# Patient Record
Sex: MALE | Race: WHITE | Employment: FULL TIME | URBAN - METROPOLITAN AREA
[De-identification: names, ages, dates, MRNs, and addresses within clinical notes are randomized per-mention and may not be internally consistent; named-entity substitution may affect disease eponyms.]

---

## 2022-10-26 ENCOUNTER — HOSPITAL ENCOUNTER (OUTPATIENT)
Age: 52
Setting detail: OBSERVATION
Discharge: HOME | End: 2022-10-27
Attending: EMERGENCY MEDICINE | Admitting: SURGERY
Payer: MEDICAID

## 2022-10-26 ENCOUNTER — APPOINTMENT (OUTPATIENT)
Dept: GENERAL RADIOLOGY | Age: 52
End: 2022-10-26
Attending: EMERGENCY MEDICINE
Payer: MEDICAID

## 2022-10-26 ENCOUNTER — APPOINTMENT (OUTPATIENT)
Dept: GENERAL RADIOLOGY | Age: 52
End: 2022-10-26
Attending: SURGERY
Payer: MEDICAID

## 2022-10-26 ENCOUNTER — ANESTHESIA EVENT (OUTPATIENT)
Dept: SURGERY | Age: 52
End: 2022-10-26
Payer: MEDICAID

## 2022-10-26 ENCOUNTER — APPOINTMENT (OUTPATIENT)
Dept: CT IMAGING | Age: 52
End: 2022-10-26
Attending: EMERGENCY MEDICINE
Payer: MEDICAID

## 2022-10-26 ENCOUNTER — ANESTHESIA (OUTPATIENT)
Dept: SURGERY | Age: 52
End: 2022-10-26
Payer: MEDICAID

## 2022-10-26 DIAGNOSIS — E11.9 TYPE 2 DIABETES MELLITUS WITHOUT COMPLICATION, WITHOUT LONG-TERM CURRENT USE OF INSULIN (HCC): ICD-10-CM

## 2022-10-26 DIAGNOSIS — K81.9 CHOLECYSTITIS: Primary | ICD-10-CM

## 2022-10-26 PROBLEM — K81.0 ACUTE CHOLECYSTITIS: Status: ACTIVE | Noted: 2022-10-26

## 2022-10-26 LAB
ALBUMIN SERPL-MCNC: 3.9 G/DL (ref 3.5–5)
ALBUMIN/GLOB SERPL: 1.1 {RATIO} (ref 1.1–2.2)
ALP SERPL-CCNC: 99 U/L (ref 45–117)
ALT SERPL-CCNC: 45 U/L (ref 12–78)
ANION GAP SERPL CALC-SCNC: 9 MMOL/L (ref 5–15)
AST SERPL-CCNC: 21 U/L (ref 15–37)
ATRIAL RATE: 88 BPM
BASOPHILS # BLD: 0.1 K/UL (ref 0–0.1)
BASOPHILS NFR BLD: 0 % (ref 0–1)
BILIRUB SERPL-MCNC: 1.8 MG/DL (ref 0.2–1)
BNP SERPL-MCNC: 113 PG/ML
BUN SERPL-MCNC: 12 MG/DL (ref 6–20)
BUN/CREAT SERPL: 15 (ref 12–20)
CALCIUM SERPL-MCNC: 10.1 MG/DL (ref 8.5–10.1)
CALCULATED P AXIS, ECG09: 70 DEGREES
CALCULATED R AXIS, ECG10: 102 DEGREES
CALCULATED T AXIS, ECG11: 10 DEGREES
CHLORIDE SERPL-SCNC: 98 MMOL/L (ref 97–108)
CO2 SERPL-SCNC: 25 MMOL/L (ref 21–32)
CREAT SERPL-MCNC: 0.82 MG/DL (ref 0.7–1.3)
DIAGNOSIS, 93000: NORMAL
DIFFERENTIAL METHOD BLD: ABNORMAL
EOSINOPHIL # BLD: 0.1 K/UL (ref 0–0.4)
EOSINOPHIL NFR BLD: 1 % (ref 0–7)
ERYTHROCYTE [DISTWIDTH] IN BLOOD BY AUTOMATED COUNT: 11.9 % (ref 11.5–14.5)
GLOBULIN SER CALC-MCNC: 3.4 G/DL (ref 2–4)
GLUCOSE BLD STRIP.AUTO-MCNC: 277 MG/DL (ref 65–117)
GLUCOSE BLD STRIP.AUTO-MCNC: 298 MG/DL (ref 65–117)
GLUCOSE BLD STRIP.AUTO-MCNC: 323 MG/DL (ref 65–117)
GLUCOSE SERPL-MCNC: 291 MG/DL (ref 65–100)
HCT VFR BLD AUTO: 49.6 % (ref 36.6–50.3)
HGB BLD-MCNC: 18.5 G/DL (ref 12.1–17)
IMM GRANULOCYTES # BLD AUTO: 0 K/UL (ref 0–0.04)
IMM GRANULOCYTES NFR BLD AUTO: 0 % (ref 0–0.5)
LIPASE SERPL-CCNC: 53 U/L (ref 73–393)
LYMPHOCYTES # BLD: 1.7 K/UL (ref 0.8–3.5)
LYMPHOCYTES NFR BLD: 15 % (ref 12–49)
MAGNESIUM SERPL-MCNC: 1.8 MG/DL (ref 1.6–2.4)
MCH RBC QN AUTO: 31.5 PG (ref 26–34)
MCHC RBC AUTO-ENTMCNC: 37.3 G/DL (ref 30–36.5)
MCV RBC AUTO: 84.4 FL (ref 80–99)
MONOCYTES # BLD: 0.6 K/UL (ref 0–1)
MONOCYTES NFR BLD: 6 % (ref 5–13)
NEUTS SEG # BLD: 8.8 K/UL (ref 1.8–8)
NEUTS SEG NFR BLD: 78 % (ref 32–75)
NRBC # BLD: 0 K/UL (ref 0–0.01)
NRBC BLD-RTO: 0 PER 100 WBC
P-R INTERVAL, ECG05: 166 MS
PLATELET # BLD AUTO: 127 K/UL (ref 150–400)
PMV BLD AUTO: 10.1 FL (ref 8.9–12.9)
POTASSIUM SERPL-SCNC: 4 MMOL/L (ref 3.5–5.1)
PROT SERPL-MCNC: 7.3 G/DL (ref 6.4–8.2)
Q-T INTERVAL, ECG07: 380 MS
QRS DURATION, ECG06: 88 MS
QTC CALCULATION (BEZET), ECG08: 459 MS
RBC # BLD AUTO: 5.88 M/UL (ref 4.1–5.7)
SERVICE CMNT-IMP: ABNORMAL
SODIUM SERPL-SCNC: 132 MMOL/L (ref 136–145)
TROPONIN-HIGH SENSITIVITY: 7 NG/L (ref 0–76)
TROPONIN-HIGH SENSITIVITY: 7 NG/L (ref 0–76)
VENTRICULAR RATE, ECG03: 88 BPM
WBC # BLD AUTO: 11.3 K/UL (ref 4.1–11.1)

## 2022-10-26 PROCEDURE — 99285 EMERGENCY DEPT VISIT HI MDM: CPT

## 2022-10-26 PROCEDURE — 77030002895 HC DEV VASC CLOSR COVD -B: Performed by: SURGERY

## 2022-10-26 PROCEDURE — 84484 ASSAY OF TROPONIN QUANT: CPT

## 2022-10-26 PROCEDURE — 85025 COMPLETE CBC W/AUTO DIFF WBC: CPT

## 2022-10-26 PROCEDURE — 74011250636 HC RX REV CODE- 250/636: Performed by: SURGERY

## 2022-10-26 PROCEDURE — 77030009851 HC PCH RTVR ENDOSC AMR -B: Performed by: SURGERY

## 2022-10-26 PROCEDURE — 77030002933 HC SUT MCRYL J&J -A: Performed by: SURGERY

## 2022-10-26 PROCEDURE — 76210000017 HC OR PH I REC 1.5 TO 2 HR: Performed by: SURGERY

## 2022-10-26 PROCEDURE — 77030020829: Performed by: SURGERY

## 2022-10-26 PROCEDURE — 77030012770 HC TRCR OPT FX AMR -B: Performed by: SURGERY

## 2022-10-26 PROCEDURE — 74011000250 HC RX REV CODE- 250: Performed by: NURSE ANESTHETIST, CERTIFIED REGISTERED

## 2022-10-26 PROCEDURE — 74011250636 HC RX REV CODE- 250/636: Performed by: NURSE ANESTHETIST, CERTIFIED REGISTERED

## 2022-10-26 PROCEDURE — 74177 CT ABD & PELVIS W/CONTRAST: CPT

## 2022-10-26 PROCEDURE — 96365 THER/PROPH/DIAG IV INF INIT: CPT

## 2022-10-26 PROCEDURE — 74300 X-RAY BILE DUCTS/PANCREAS: CPT

## 2022-10-26 PROCEDURE — 77030010513 HC APPL CLP LIG J&J -C: Performed by: SURGERY

## 2022-10-26 PROCEDURE — 71045 X-RAY EXAM CHEST 1 VIEW: CPT

## 2022-10-26 PROCEDURE — 74011250636 HC RX REV CODE- 250/636: Performed by: EMERGENCY MEDICINE

## 2022-10-26 PROCEDURE — 82962 GLUCOSE BLOOD TEST: CPT

## 2022-10-26 PROCEDURE — 74011000636 HC RX REV CODE- 636: Performed by: EMERGENCY MEDICINE

## 2022-10-26 PROCEDURE — 76060000033 HC ANESTHESIA 1 TO 1.5 HR: Performed by: SURGERY

## 2022-10-26 PROCEDURE — 83690 ASSAY OF LIPASE: CPT

## 2022-10-26 PROCEDURE — 74011000258 HC RX REV CODE- 258: Performed by: EMERGENCY MEDICINE

## 2022-10-26 PROCEDURE — 76010000149 HC OR TIME 1 TO 1.5 HR: Performed by: SURGERY

## 2022-10-26 PROCEDURE — 93005 ELECTROCARDIOGRAM TRACING: CPT

## 2022-10-26 PROCEDURE — 74011636637 HC RX REV CODE- 636/637: Performed by: ANESTHESIOLOGY

## 2022-10-26 PROCEDURE — 74011250636 HC RX REV CODE- 250/636: Performed by: ANESTHESIOLOGY

## 2022-10-26 PROCEDURE — 36415 COLL VENOUS BLD VENIPUNCTURE: CPT

## 2022-10-26 PROCEDURE — 2709999900 HC NON-CHARGEABLE SUPPLY: Performed by: SURGERY

## 2022-10-26 PROCEDURE — 74011250637 HC RX REV CODE- 250/637: Performed by: EMERGENCY MEDICINE

## 2022-10-26 PROCEDURE — 74011000250 HC RX REV CODE- 250: Performed by: SURGERY

## 2022-10-26 PROCEDURE — 77030013079 HC BLNKT BAIR HGGR 3M -A: Performed by: ANESTHESIOLOGY

## 2022-10-26 PROCEDURE — 99218 PR INITIAL OBSERVATION CARE/DAY 30 MINUTES: CPT | Performed by: SURGERY

## 2022-10-26 PROCEDURE — 80053 COMPREHEN METABOLIC PANEL: CPT

## 2022-10-26 PROCEDURE — 74011000250 HC RX REV CODE- 250: Performed by: EMERGENCY MEDICINE

## 2022-10-26 PROCEDURE — 83880 ASSAY OF NATRIURETIC PEPTIDE: CPT

## 2022-10-26 PROCEDURE — 77030008684 HC TU ET CUF COVD -B: Performed by: ANESTHESIOLOGY

## 2022-10-26 PROCEDURE — 74011000636 HC RX REV CODE- 636: Performed by: SURGERY

## 2022-10-26 PROCEDURE — G0378 HOSPITAL OBSERVATION PER HR: HCPCS

## 2022-10-26 PROCEDURE — 77030004813 HC CATH CHOLGM TELE -B: Performed by: SURGERY

## 2022-10-26 PROCEDURE — 83735 ASSAY OF MAGNESIUM: CPT

## 2022-10-26 PROCEDURE — 88304 TISSUE EXAM BY PATHOLOGIST: CPT

## 2022-10-26 PROCEDURE — 77030010507 HC ADH SKN DERMBND J&J -B: Performed by: SURGERY

## 2022-10-26 PROCEDURE — 77030016151 HC PROTCTR LNS DFOG COVD -B: Performed by: SURGERY

## 2022-10-26 PROCEDURE — 77030009403 HC ELECTRD ENDO MEGA -B: Performed by: SURGERY

## 2022-10-26 PROCEDURE — 88313 SPECIAL STAINS GROUP 2: CPT

## 2022-10-26 PROCEDURE — 77030008606 HC TRCR ENDOSC KII AMR -B: Performed by: SURGERY

## 2022-10-26 PROCEDURE — 74011250637 HC RX REV CODE- 250/637: Performed by: SURGERY

## 2022-10-26 PROCEDURE — 77030031139 HC SUT VCRL2 J&J -A: Performed by: SURGERY

## 2022-10-26 PROCEDURE — 88307 TISSUE EXAM BY PATHOLOGIST: CPT

## 2022-10-26 PROCEDURE — 47563 LAPARO CHOLECYSTECTOMY/GRAPH: CPT | Performed by: SURGERY

## 2022-10-26 PROCEDURE — 77030008756 HC TU IRR SUC STRY -B: Performed by: SURGERY

## 2022-10-26 RX ORDER — BUPIVACAINE HYDROCHLORIDE AND EPINEPHRINE 5; 5 MG/ML; UG/ML
INJECTION, SOLUTION PERINEURAL AS NEEDED
Status: DISCONTINUED | OUTPATIENT
Start: 2022-10-26 | End: 2022-10-26 | Stop reason: HOSPADM

## 2022-10-26 RX ORDER — ONDANSETRON 2 MG/ML
4 INJECTION INTRAMUSCULAR; INTRAVENOUS AS NEEDED
Status: DISCONTINUED | OUTPATIENT
Start: 2022-10-26 | End: 2022-10-26 | Stop reason: HOSPADM

## 2022-10-26 RX ORDER — HYDROMORPHONE HYDROCHLORIDE 1 MG/ML
.2-.5 INJECTION, SOLUTION INTRAMUSCULAR; INTRAVENOUS; SUBCUTANEOUS
Status: DISCONTINUED | OUTPATIENT
Start: 2022-10-26 | End: 2022-10-26 | Stop reason: HOSPADM

## 2022-10-26 RX ORDER — FENTANYL CITRATE 50 UG/ML
50 INJECTION, SOLUTION INTRAMUSCULAR; INTRAVENOUS AS NEEDED
Status: DISCONTINUED | OUTPATIENT
Start: 2022-10-26 | End: 2022-10-26

## 2022-10-26 RX ORDER — FENTANYL CITRATE 50 UG/ML
25 INJECTION, SOLUTION INTRAMUSCULAR; INTRAVENOUS
Status: DISCONTINUED | OUTPATIENT
Start: 2022-10-26 | End: 2022-10-26 | Stop reason: HOSPADM

## 2022-10-26 RX ORDER — LIDOCAINE HYDROCHLORIDE 10 MG/ML
0.1 INJECTION, SOLUTION EPIDURAL; INFILTRATION; INTRACAUDAL; PERINEURAL AS NEEDED
Status: DISCONTINUED | OUTPATIENT
Start: 2022-10-26 | End: 2022-10-26

## 2022-10-26 RX ORDER — GLYCOPYRROLATE 0.2 MG/ML
INJECTION INTRAMUSCULAR; INTRAVENOUS AS NEEDED
Status: DISCONTINUED | OUTPATIENT
Start: 2022-10-26 | End: 2022-10-26 | Stop reason: HOSPADM

## 2022-10-26 RX ORDER — METOPROLOL TARTRATE 25 MG/1
25 TABLET, FILM COATED ORAL 2 TIMES DAILY
Status: DISCONTINUED | OUTPATIENT
Start: 2022-10-26 | End: 2022-10-27 | Stop reason: HOSPADM

## 2022-10-26 RX ORDER — NALOXONE HYDROCHLORIDE 0.4 MG/ML
0.4 INJECTION, SOLUTION INTRAMUSCULAR; INTRAVENOUS; SUBCUTANEOUS AS NEEDED
Status: DISCONTINUED | OUTPATIENT
Start: 2022-10-26 | End: 2022-10-27 | Stop reason: HOSPADM

## 2022-10-26 RX ORDER — SODIUM CHLORIDE 0.9 % (FLUSH) 0.9 %
5-40 SYRINGE (ML) INJECTION EVERY 8 HOURS
Status: DISCONTINUED | OUTPATIENT
Start: 2022-10-26 | End: 2022-10-27 | Stop reason: HOSPADM

## 2022-10-26 RX ORDER — AMLODIPINE BESYLATE 5 MG/1
5 TABLET ORAL DAILY
Status: DISCONTINUED | OUTPATIENT
Start: 2022-10-27 | End: 2022-10-27 | Stop reason: HOSPADM

## 2022-10-26 RX ORDER — METOPROLOL TARTRATE 5 MG/5ML
5 INJECTION INTRAVENOUS
Status: DISCONTINUED | OUTPATIENT
Start: 2022-10-26 | End: 2022-10-26

## 2022-10-26 RX ORDER — ONDANSETRON 2 MG/ML
INJECTION INTRAMUSCULAR; INTRAVENOUS AS NEEDED
Status: DISCONTINUED | OUTPATIENT
Start: 2022-10-26 | End: 2022-10-26 | Stop reason: HOSPADM

## 2022-10-26 RX ORDER — SODIUM CHLORIDE, SODIUM LACTATE, POTASSIUM CHLORIDE, CALCIUM CHLORIDE 600; 310; 30; 20 MG/100ML; MG/100ML; MG/100ML; MG/100ML
25 INJECTION, SOLUTION INTRAVENOUS CONTINUOUS
Status: DISCONTINUED | OUTPATIENT
Start: 2022-10-26 | End: 2022-10-26 | Stop reason: HOSPADM

## 2022-10-26 RX ORDER — SODIUM CHLORIDE 0.9 % (FLUSH) 0.9 %
5-40 SYRINGE (ML) INJECTION AS NEEDED
Status: DISCONTINUED | OUTPATIENT
Start: 2022-10-26 | End: 2022-10-27 | Stop reason: HOSPADM

## 2022-10-26 RX ORDER — NEOSTIGMINE METHYLSULFATE 1 MG/ML
INJECTION, SOLUTION INTRAVENOUS AS NEEDED
Status: DISCONTINUED | OUTPATIENT
Start: 2022-10-26 | End: 2022-10-26 | Stop reason: HOSPADM

## 2022-10-26 RX ORDER — SODIUM CHLORIDE, SODIUM LACTATE, POTASSIUM CHLORIDE, CALCIUM CHLORIDE 600; 310; 30; 20 MG/100ML; MG/100ML; MG/100ML; MG/100ML
INJECTION, SOLUTION INTRAVENOUS
Status: DISCONTINUED | OUTPATIENT
Start: 2022-10-26 | End: 2022-10-26 | Stop reason: HOSPADM

## 2022-10-26 RX ORDER — MORPHINE SULFATE 2 MG/ML
4 INJECTION, SOLUTION INTRAMUSCULAR; INTRAVENOUS
Status: DISPENSED | OUTPATIENT
Start: 2022-10-26 | End: 2022-10-26

## 2022-10-26 RX ORDER — PROPOFOL 10 MG/ML
INJECTION, EMULSION INTRAVENOUS AS NEEDED
Status: DISCONTINUED | OUTPATIENT
Start: 2022-10-26 | End: 2022-10-26 | Stop reason: HOSPADM

## 2022-10-26 RX ORDER — HYDROMORPHONE HYDROCHLORIDE 1 MG/ML
0.5 INJECTION, SOLUTION INTRAMUSCULAR; INTRAVENOUS; SUBCUTANEOUS
Status: DISCONTINUED | OUTPATIENT
Start: 2022-10-26 | End: 2022-10-27 | Stop reason: HOSPADM

## 2022-10-26 RX ORDER — EPHEDRINE SULFATE/0.9% NACL/PF 50 MG/5 ML
SYRINGE (ML) INTRAVENOUS AS NEEDED
Status: DISCONTINUED | OUTPATIENT
Start: 2022-10-26 | End: 2022-10-26 | Stop reason: HOSPADM

## 2022-10-26 RX ORDER — ROCURONIUM BROMIDE 10 MG/ML
INJECTION, SOLUTION INTRAVENOUS AS NEEDED
Status: DISCONTINUED | OUTPATIENT
Start: 2022-10-26 | End: 2022-10-26 | Stop reason: HOSPADM

## 2022-10-26 RX ORDER — DEXTROSE, SODIUM CHLORIDE, AND POTASSIUM CHLORIDE 5; .45; .15 G/100ML; G/100ML; G/100ML
125 INJECTION INTRAVENOUS CONTINUOUS
Status: DISCONTINUED | OUTPATIENT
Start: 2022-10-26 | End: 2022-10-27

## 2022-10-26 RX ORDER — TRAZODONE HYDROCHLORIDE 100 MG/1
100 TABLET ORAL
Status: DISCONTINUED | OUTPATIENT
Start: 2022-10-26 | End: 2022-10-27 | Stop reason: HOSPADM

## 2022-10-26 RX ORDER — MIDAZOLAM HYDROCHLORIDE 1 MG/ML
1 INJECTION, SOLUTION INTRAMUSCULAR; INTRAVENOUS AS NEEDED
Status: DISCONTINUED | OUTPATIENT
Start: 2022-10-26 | End: 2022-10-26

## 2022-10-26 RX ORDER — OXYCODONE HYDROCHLORIDE 5 MG/1
5 TABLET ORAL
Status: DISCONTINUED | OUTPATIENT
Start: 2022-10-26 | End: 2022-10-27 | Stop reason: HOSPADM

## 2022-10-26 RX ORDER — FENTANYL CITRATE 50 UG/ML
INJECTION, SOLUTION INTRAMUSCULAR; INTRAVENOUS AS NEEDED
Status: DISCONTINUED | OUTPATIENT
Start: 2022-10-26 | End: 2022-10-26 | Stop reason: HOSPADM

## 2022-10-26 RX ORDER — AMOXICILLIN 250 MG
1 CAPSULE ORAL DAILY
Status: DISCONTINUED | OUTPATIENT
Start: 2022-10-27 | End: 2022-10-27 | Stop reason: HOSPADM

## 2022-10-26 RX ORDER — CEFAZOLIN SODIUM 1 G/3ML
INJECTION, POWDER, FOR SOLUTION INTRAMUSCULAR; INTRAVENOUS AS NEEDED
Status: DISCONTINUED | OUTPATIENT
Start: 2022-10-26 | End: 2022-10-26 | Stop reason: HOSPADM

## 2022-10-26 RX ORDER — ONDANSETRON 2 MG/ML
4 INJECTION INTRAMUSCULAR; INTRAVENOUS
Status: DISCONTINUED | OUTPATIENT
Start: 2022-10-26 | End: 2022-10-27 | Stop reason: HOSPADM

## 2022-10-26 RX ORDER — SODIUM CHLORIDE, SODIUM LACTATE, POTASSIUM CHLORIDE, CALCIUM CHLORIDE 600; 310; 30; 20 MG/100ML; MG/100ML; MG/100ML; MG/100ML
25 INJECTION, SOLUTION INTRAVENOUS CONTINUOUS
Status: DISCONTINUED | OUTPATIENT
Start: 2022-10-26 | End: 2022-10-26

## 2022-10-26 RX ORDER — PHENYLEPHRINE HCL IN 0.9% NACL 0.4MG/10ML
SYRINGE (ML) INTRAVENOUS AS NEEDED
Status: DISCONTINUED | OUTPATIENT
Start: 2022-10-26 | End: 2022-10-26 | Stop reason: HOSPADM

## 2022-10-26 RX ORDER — METFORMIN HYDROCHLORIDE 500 MG/1
1000 TABLET ORAL 2 TIMES DAILY WITH MEALS
Status: DISCONTINUED | OUTPATIENT
Start: 2022-10-27 | End: 2022-10-27 | Stop reason: HOSPADM

## 2022-10-26 RX ORDER — ACETAMINOPHEN 325 MG/1
650 TABLET ORAL
Status: DISCONTINUED | OUTPATIENT
Start: 2022-10-26 | End: 2022-10-27 | Stop reason: HOSPADM

## 2022-10-26 RX ORDER — DIPHENHYDRAMINE HYDROCHLORIDE 50 MG/ML
25 INJECTION, SOLUTION INTRAMUSCULAR; INTRAVENOUS
Status: DISCONTINUED | OUTPATIENT
Start: 2022-10-26 | End: 2022-10-27 | Stop reason: HOSPADM

## 2022-10-26 RX ORDER — MIDAZOLAM HYDROCHLORIDE 1 MG/ML
INJECTION, SOLUTION INTRAMUSCULAR; INTRAVENOUS AS NEEDED
Status: DISCONTINUED | OUTPATIENT
Start: 2022-10-26 | End: 2022-10-26 | Stop reason: HOSPADM

## 2022-10-26 RX ORDER — DEXAMETHASONE SODIUM PHOSPHATE 4 MG/ML
INJECTION, SOLUTION INTRA-ARTICULAR; INTRALESIONAL; INTRAMUSCULAR; INTRAVENOUS; SOFT TISSUE AS NEEDED
Status: DISCONTINUED | OUTPATIENT
Start: 2022-10-26 | End: 2022-10-26 | Stop reason: HOSPADM

## 2022-10-26 RX ADMIN — CEFAZOLIN 2 G: 1 INJECTION, POWDER, FOR SOLUTION INTRAMUSCULAR; INTRAVENOUS; PARENTERAL at 10:53

## 2022-10-26 RX ADMIN — MIDAZOLAM HYDROCHLORIDE 2 MG: 1 INJECTION, SOLUTION INTRAMUSCULAR; INTRAVENOUS at 10:40

## 2022-10-26 RX ADMIN — HYDROMORPHONE HYDROCHLORIDE 0.5 MG: 1 INJECTION, SOLUTION INTRAMUSCULAR; INTRAVENOUS; SUBCUTANEOUS at 13:04

## 2022-10-26 RX ADMIN — LIDOCAINE HYDROCHLORIDE 40 ML: 20 SOLUTION TOPICAL at 07:23

## 2022-10-26 RX ADMIN — OXYCODONE 5 MG: 5 TABLET ORAL at 21:04

## 2022-10-26 RX ADMIN — DEXTROSE MONOHYDRATE, SODIUM CHLORIDE, AND POTASSIUM CHLORIDE 125 ML/HR: 50; 4.5; 1.49 INJECTION, SOLUTION INTRAVENOUS at 21:06

## 2022-10-26 RX ADMIN — Medication 200 MCG: at 11:06

## 2022-10-26 RX ADMIN — METOPROLOL TARTRATE 25 MG: 25 TABLET ORAL at 18:00

## 2022-10-26 RX ADMIN — DEXAMETHASONE SODIUM PHOSPHATE 8 MG: 4 INJECTION, SOLUTION INTRAMUSCULAR; INTRAVENOUS at 11:19

## 2022-10-26 RX ADMIN — PIPERACILLIN AND TAZOBACTAM 4.5 G: 4; .5 INJECTION, POWDER, FOR SOLUTION INTRAVENOUS at 09:19

## 2022-10-26 RX ADMIN — OXYCODONE 5 MG: 5 TABLET ORAL at 14:22

## 2022-10-26 RX ADMIN — SODIUM CHLORIDE, PRESERVATIVE FREE 10 ML: 5 INJECTION INTRAVENOUS at 14:24

## 2022-10-26 RX ADMIN — Medication 3 MG: at 11:37

## 2022-10-26 RX ADMIN — SODIUM CHLORIDE, POTASSIUM CHLORIDE, SODIUM LACTATE AND CALCIUM CHLORIDE: 600; 310; 30; 20 INJECTION, SOLUTION INTRAVENOUS at 10:40

## 2022-10-26 RX ADMIN — Medication 1 AMPULE: at 14:00

## 2022-10-26 RX ADMIN — SODIUM CHLORIDE, PRESERVATIVE FREE 10 ML: 5 INJECTION INTRAVENOUS at 21:05

## 2022-10-26 RX ADMIN — Medication 10 UNITS: at 12:20

## 2022-10-26 RX ADMIN — IOPAMIDOL 100 ML: 755 INJECTION, SOLUTION INTRAVENOUS at 08:19

## 2022-10-26 RX ADMIN — FENTANYL CITRATE 100 MCG: 50 INJECTION INTRAMUSCULAR; INTRAVENOUS at 10:42

## 2022-10-26 RX ADMIN — FENTANYL CITRATE 50 MCG: 50 INJECTION INTRAMUSCULAR; INTRAVENOUS at 11:12

## 2022-10-26 RX ADMIN — FENTANYL CITRATE 100 MCG: 50 INJECTION INTRAMUSCULAR; INTRAVENOUS at 11:10

## 2022-10-26 RX ADMIN — PROPOFOL 200 MG: 10 INJECTION, EMULSION INTRAVENOUS at 10:42

## 2022-10-26 RX ADMIN — ROCURONIUM BROMIDE 20 MG: 10 INJECTION INTRAVENOUS at 11:10

## 2022-10-26 RX ADMIN — HYDROMORPHONE HYDROCHLORIDE 0.5 MG: 1 INJECTION, SOLUTION INTRAMUSCULAR; INTRAVENOUS; SUBCUTANEOUS at 12:47

## 2022-10-26 RX ADMIN — Medication 100 MCG: at 11:21

## 2022-10-26 RX ADMIN — Medication 20 UNITS: at 13:02

## 2022-10-26 RX ADMIN — Medication 5 MG: at 11:06

## 2022-10-26 RX ADMIN — GLYCOPYRROLATE 0.4 MG: 0.2 INJECTION, SOLUTION INTRAMUSCULAR; INTRAVENOUS at 11:37

## 2022-10-26 RX ADMIN — HYDROMORPHONE HYDROCHLORIDE 0.5 MG: 1 INJECTION, SOLUTION INTRAMUSCULAR; INTRAVENOUS; SUBCUTANEOUS at 12:31

## 2022-10-26 RX ADMIN — HYDROMORPHONE HYDROCHLORIDE 0.5 MG: 1 INJECTION, SOLUTION INTRAMUSCULAR; INTRAVENOUS; SUBCUTANEOUS at 16:34

## 2022-10-26 RX ADMIN — DEXTROSE MONOHYDRATE, SODIUM CHLORIDE, AND POTASSIUM CHLORIDE 125 ML/HR: 50; 4.5; 1.49 INJECTION, SOLUTION INTRAVENOUS at 12:29

## 2022-10-26 RX ADMIN — ONDANSETRON HYDROCHLORIDE 4 MG: 2 INJECTION, SOLUTION INTRAMUSCULAR; INTRAVENOUS at 11:19

## 2022-10-26 RX ADMIN — Medication 80 MCG: at 10:59

## 2022-10-26 RX ADMIN — ROCURONIUM BROMIDE 30 MG: 10 INJECTION INTRAVENOUS at 10:42

## 2022-10-26 NOTE — ED PROVIDER NOTES
EMERGENCY DEPARTMENT HISTORY AND PHYSICAL EXAM      Date: 10/26/2022  Patient Name: Renee Roger    History of Presenting Illness     Chief Complaint   Patient presents with    Chest Pain     Patient into triage for chest pain x1day. States it is constant medial chest pain, states some shortness of breath. Denies cardiac Hx       History Provided By: Patient    HPI: Renee Roger, 47 y.o. male with PMHx as noted below presents the emergency department chief complaint epigastric pain. Patient had onset of pain yesterday. He describes it as a dull, aching pain in his epigastrium that radiates up to his chest.  Pain seems to be constant and moderate in intensity. Pt denies any other alleviating or exacerbating factors. Additionally, pt specifically denies any recent fever, chills, headache, nausea, vomiting, SOB, lightheadedness, dizziness, numbness, weakness, BLE swelling, heart palpitations, urinary sxs, diarrhea, constipation, melena, hematochezia, cough, or congestion. PCP: Kendall Salvador MD    Current Facility-Administered Medications   Medication Dose Route Frequency Provider Last Rate Last Admin    morphine injection 4 mg  4 mg IntraVENous NOW Uche Self MD        piperacillin-tazobactam (ZOSYN) 4.5 g in 0.9% sodium chloride (MBP/ADV) 100 mL MBP  4.5 g IntraVENous NOW Uche Self MD         Current Outpatient Medications   Medication Sig Dispense Refill    amLODIPine (NORVASC) 5 mg tablet Take 1 Tab by mouth daily. 90 Tab 1    glimepiride (AMARYL) 2 mg tablet 1 Tab by Per NG tube route Daily (before breakfast). 90 Tab 1    polyethylene glycol (MIRALAX) 17 gram packet Take 1 Packet by mouth two (2) times a day. 30 Packet 2    senna-docusate (PERICOLACE) 8.6-50 mg per tablet Take 1 Tab by mouth daily. 15 Tab 0    metoprolol (LOPRESSOR) 25 mg tablet TAKE ONE TABLET BY MOUTH EVERY 12 HOURS 30 Tab 0    metFORMIN (GLUCOPHAGE) 1,000 mg tablet Take 1 Tab by mouth two (2) times daily (with meals). 60 Tab 2    traZODone (DESYREL) 100 mg tablet Take 1 Tab by mouth nightly. 30 Tab 2    ondansetron (ZOFRAN ODT) 4 mg disintegrating tablet Take 1 Tab by mouth every eight (8) hours as needed for Nausea. 15 Tab 1       Past History     Past Medical History:  Past Medical History:   Diagnosis Date    Asthma     DM (diabetes mellitus) ([facility] [address]     Encounter for long-term (current) use of medications 2016    Other ill-defined conditions(799.89) neck injury    Secondary DM without complications, uncontrolled 2016       Past Surgical History:  Past Surgical History:   Procedure Laterality Date    [place] CHEST SURGERY PROCEDURE UNLISTED         Family History:  Family History   Problem Relation Age of Onset    Cancer Mother     Asthma Mother     Diabetes Father        Social History:  Social History     Tobacco Use    Smoking status: Former     Packs/day: 1.00     Types: Cigarettes     Quit date: 2015     Years since quittin.7    Smokeless tobacco: Never   Substance Use Topics    Alcohol use: No    Drug use: No       Allergies:  No Known Allergies      Review of Systems   Review of Systems  Constitutional: Negative for fever, chills, and fatigue. HENT: Negative for congestion, sore throat, rhinorrhea, sneezing and neck stiffness   Eyes: Negative for discharge and redness. Respiratory: Negative for  shortness of breath, wheezing   Cardiovascular: Negative for chest pain, palpitations   Gastrointestinal: Positive abdominal pain. Negative for nausea, vomiting, abdominal pain, constipation, diarrhea and blood in stool. Genitourinary: Negative for dysuria, hematuria, flank pain, decreased urine volume, discharge,   Musculoskeletal: Negative for myalgias or joint pain . Skin: Negative for rash or lesions . Neurological: Negative weakness, light-headedness, numbness and headaches.        Physical Exam   Physical Exam    GENERAL: alert and oriented, no acute distress  EYES: PEERL, No injection, discharge or icterus. ENT: Mucous membranes pink and moist.  NECK: Supple  LUNGS: Airway patent. Non-labored respirations. Breath sounds clear with good air entry bilaterally. HEART: Regular rate and rhythm. No peripheral edema  ABDOMEN: Non-distended, epigastric and RUQ tenderness, without guarding or rebound. SKIN:  warm, dry  MSK/EXTREMITIES: Without swelling, tenderness or deformity, symmetric with normal ROM  NEUROLOGICAL: Alert, oriented      Diagnostic Study Results     Labs -     Recent Results (from the past 12 hour(s))   CBC WITH AUTOMATED DIFF    Collection Time: 10/26/22  6:14 AM   Result Value Ref Range    WBC 11.3 (H) 4.1 - 11.1 K/uL    RBC 5.88 (H) 4.10 - 5.70 M/uL    HGB 18.5 (H) 12.1 - 17.0 g/dL    HCT 49.6 36.6 - 50.3 %    MCV 84.4 80.0 - 99.0 FL    MCH 31.5 26.0 - 34.0 PG    MCHC 37.3 (H) 30.0 - 36.5 g/dL    RDW 11.9 11.5 - 14.5 %    PLATELET 801 (L) 642 - 400 K/uL    MPV 10.1 8.9 - 12.9 FL    NRBC 0.0 0  WBC    ABSOLUTE NRBC 0.00 0.00 - 0.01 K/uL    NEUTROPHILS 78 (H) 32 - 75 %    LYMPHOCYTES 15 12 - 49 %    MONOCYTES 6 5 - 13 %    EOSINOPHILS 1 0 - 7 %    BASOPHILS 0 0 - 1 %    IMMATURE GRANULOCYTES 0 0.0 - 0.5 %    ABS. NEUTROPHILS 8.8 (H) 1.8 - 8.0 K/UL    ABS. LYMPHOCYTES 1.7 0.8 - 3.5 K/UL    ABS. MONOCYTES 0.6 0.0 - 1.0 K/UL    ABS. EOSINOPHILS 0.1 0.0 - 0.4 K/UL    ABS. BASOPHILS 0.1 0.0 - 0.1 K/UL    ABS. IMM.  GRANS. 0.0 0.00 - 0.04 K/UL    DF AUTOMATED     METABOLIC PANEL, COMPREHENSIVE    Collection Time: 10/26/22  6:14 AM   Result Value Ref Range    Sodium 132 (L) 136 - 145 mmol/L    Potassium 4.0 3.5 - 5.1 mmol/L    Chloride 98 97 - 108 mmol/L    CO2 25 21 - 32 mmol/L    Anion gap 9 5 - 15 mmol/L    Glucose 291 (H) 65 - 100 mg/dL    BUN 12 6 - 20 MG/DL    Creatinine 0.82 0.70 - 1.30 MG/DL    BUN/Creatinine ratio 15 12 - 20      eGFR >60 >60 ml/min/1.73m2    Calcium 10.1 8.5 - 10.1 MG/DL    Bilirubin, total 1.8 (H) 0.2 - 1.0 MG/DL    ALT (SGPT) 45 12 - 78 U/L    AST (SGOT) 21 15 - 37 U/L Alk. phosphatase 99 45 - 117 U/L    Protein, total 7.3 6.4 - 8.2 g/dL    Albumin 3.9 3.5 - 5.0 g/dL    Globulin 3.4 2.0 - 4.0 g/dL    A-G Ratio 1.1 1.1 - 2.2     NT-PRO BNP    Collection Time: 10/26/22  6:14 AM   Result Value Ref Range    NT pro- <125 PG/ML   TROPONIN-HIGH SENSITIVITY    Collection Time: 10/26/22  6:14 AM   Result Value Ref Range    Troponin-High Sensitivity 7 0 - 76 ng/L   LIPASE    Collection Time: 10/26/22  6:14 AM   Result Value Ref Range    Lipase 53 (L) 73 - 393 U/L   MAGNESIUM    Collection Time: 10/26/22  6:14 AM   Result Value Ref Range    Magnesium 1.8 1.6 - 2.4 mg/dL   TROPONIN-HIGH SENSITIVITY    Collection Time: 10/26/22  7:47 AM   Result Value Ref Range    Troponin-High Sensitivity 7 0 - 76 ng/L       Radiologic Studies -   CT ABD PELV W CONT   Final Result   Cholelithiasis with slight gallbladder edema concerning for acute cholecystitis. Hepatic steatosis. XR CHEST PORT   Final Result   No acute process. CT Results  (Last 48 hours)                 10/26/22 0819  CT ABD PELV W CONT Final result    Impression:  Cholelithiasis with slight gallbladder edema concerning for acute cholecystitis. Hepatic steatosis. Narrative:  EXAM: CT ABD PELV W CONT       INDICATION: upper abd pain, elevated bilirubin/wbc       COMPARISON: None        CONTRAST: 100 mL of Isovue-370. ORAL CONTRAST: None       TECHNIQUE:    Following the uneventful intravenous administration of contrast, thin axial   images were obtained through the abdomen and pelvis. Coronal and sagittal   reconstructions were generated. CT dose reduction was achieved through use of a   standardized protocol tailored for this examination and automatic exposure   control for dose modulation. FINDINGS:    LOWER THORAX: No significant abnormality in the incidentally imaged lower chest.   LIVER: Hepatic steatosis. BILIARY TREE: Gallstones are noted. Slight pericholecystic edema is noted.  CBD   is not dilated. SPLEEN: within normal limits. PANCREAS: No mass or ductal dilatation. ADRENALS: Unremarkable. KIDNEYS: No mass, calculus, or hydronephrosis. STOMACH: Unremarkable. SMALL BOWEL: No dilatation or wall thickening. COLON: No dilatation or wall thickening. APPENDIX: Within normal limits. PERITONEUM: No ascites or pneumoperitoneum. RETROPERITONEUM: No lymphadenopathy or aortic aneurysm. REPRODUCTIVE ORGANS: Unremarkable. URINARY BLADDER: No mass or calculus. BONES: Degenerative changes are seen in the lumbar spine. ABDOMINAL WALL: No mass or hernia. ADDITIONAL COMMENTS: N/A                 CXR Results  (Last 48 hours)                 10/26/22 0715  XR CHEST PORT Final result    Impression:  No acute process. Narrative:  INDICATION: Chest Pain       EXAM:  AP CHEST RADIOGRAPH       COMPARISON: February 22, 2016       FINDINGS:       AP portable view of the chest demonstrates a normal cardiomediastinal   silhouette. There is no edema, effusion, consolidation, or pneumothorax. Minimal   left basilar atelectasis/scarring. The osseous structures are unremarkable. Medical Decision Making     I, Rupert Guevara MD am the first provider for this patient and am the attending of record for this patient encounter. I reviewed the vital signs, available nursing notes, past medical history, past surgical history, family history and social history. Vital Signs-Reviewed the patient's vital signs. Patient Vitals for the past 12 hrs:   Temp Pulse Resp BP SpO2   10/26/22 0656 -- 81 -- (!) 146/90 92 %   10/26/22 0631 -- 69 -- (!) 142/93 99 %   10/26/22 0611 98.4 °F (36.9 °C) 91 22 126/75 99 %       Records Reviewed: Nursing Notes and Old Medical Records    Provider Notes (Medical Decision Making): The patient presents with a chief complaint of abdominal pain.   Differential diagnosis is broad and includes acute appendicitis, acute cholecystitis, pancreatitis, gastritis, PUD, diverticulitis, mesenteric ischemia, abdominal aortic aneurysm, aortic dissection, bowel obstruction, enteritis, colitis, fecal impaction, volvulus, IBS, inflammatory bowel disease, specific food intolerance, peritonitis, perforated viscous, malignancy, UTI, abscess, testicular torsion, epididymitis, orchitis, testicular torsion and abdominal pain NOS. Ludmila Remedies All labs and diagnostic studies were reviewed and interpreted by me, findings concerning for possible early cholecystitis. Patient started on antibiotics, morphine for pain, consult with Dr. Brown Boo general surgery who will admit. ED COURSE:  Initial assessment performed. The patients presenting problems have been discussed, and they are in agreement with the care plan formulated and outlined with them. I have encouraged them to ask questions as they arise throughout their visit. Medications   morphine injection 4 mg (4 mg IntraVENous Refused 10/26/22 0746)   piperacillin-tazobactam (ZOSYN) 4.5 g in 0.9% sodium chloride (MBP/ADV) 100 mL MBP (has no administration in time range)   maalox/viscous lidocaine (COV GI COCKTAIL) (40 mL Oral Given 10/26/22 0723)   iopamidoL (ISOVUE-370) 76 % injection 100 mL (100 mL IntraVENous Given 10/26/22 0819)       Admit Note  Patient is being admitted to the hospital by Dr. Sylvie Denis. The results of their tests and reasons for their admission have been discussed with them and/or available family. They convey agreement and understanding for the need to be admitted and for their admission diagnosis. Consultation has been made with the inpatient physician specialist for hospitalization. Disposition:  Admit      PLAN:  1. admit    Diagnosis     Clinical Impression:   1. Cholecystitis        Please note that this dictation was completed with Dragon, computer voice recognition software.   Quite often unanticipated grammatical, syntax, homophones, and other interpretive errors are inadvertently transcribed by the computer software. Please disregard these errors. Additionally, please excuse any errors that have escaped final proofreading.

## 2022-10-26 NOTE — PROGRESS NOTES
Problem: Falls - Risk of  Goal: *Absence of Falls  Description: Document Sheridan Gaspar Fall Risk and appropriate interventions in the flowsheet.   Outcome: Progressing Towards Goal  Note: Fall Risk Interventions:            Medication Interventions: Teach patient to arise slowly

## 2022-10-26 NOTE — H&P
Cholelithasis Consultation      Subjective:      America Chairez is an 47 y.o.  male who presents to the ER with epigastric pain. Pain is rated 10/10 and described as sharp and stabbing. Pain started yesterday morning. Nothing makes the pain better. Contact makes it worse. Patient denies fevers, chills, nausea and vomiting. He denies a history of biliary cholic. Past Medical History:   Diagnosis Date    Asthma     DM (diabetes mellitus) ([facility] [address]     Encounter for long-term (current) use of medications 2016    Other ill-defined conditions(799.89) neck injury    Secondary DM without complications, uncontrolled 2016     Family History   Problem Relation Age of Onset    Cancer Mother     Asthma Mother     Diabetes Father      Prior to Admission Medications   Prescriptions Last Dose Informant Patient Reported? Taking? amLODIPine (NORVASC) 5 mg tablet   No No   Sig: Take 1 Tab by mouth daily. glimepiride (AMARYL) 2 mg tablet   No No   Si Tab by Per NG tube route Daily (before breakfast). metFORMIN (GLUCOPHAGE) 1,000 mg tablet   No No   Sig: Take 1 Tab by mouth two (2) times daily (with meals). metoprolol (LOPRESSOR) 25 mg tablet   No No   Sig: TAKE ONE TABLET BY MOUTH EVERY 12 HOURS   ondansetron (ZOFRAN ODT) 4 mg disintegrating tablet   No No   Sig: Take 1 Tab by mouth every eight (8) hours as needed for Nausea. polyethylene glycol (MIRALAX) 17 gram packet   No No   Sig: Take 1 Packet by mouth two (2) times a day. senna-docusate (PERICOLACE) 8.6-50 mg per tablet   No No   Sig: Take 1 Tab by mouth daily. traZODone (DESYREL) 100 mg tablet   No No   Sig: Take 1 Tab by mouth nightly.       Facility-Administered Medications: None     No Known Allergies  Social History     Socioeconomic History    Marital status: SINGLE     Spouse name: Not on file    Number of children: Not on file    Years of education: Not on file    Highest education level: Not on file   Occupational History Not on file   Tobacco Use    Smoking status: Former     Packs/day: 1.00     Types: Cigarettes     Quit date: 2015     Years since quittin.7    Smokeless tobacco: Never   Substance and Sexual Activity    Alcohol use: No    Drug use: No    Sexual activity: Yes     Partners: Female     Birth control/protection: None   Other Topics Concern    Not on file   Social History Narrative    Not on file     Social Determinants of Health     Financial Resource Strain: Not on file   Food Insecurity: Not on file   Transportation Needs: Not on file   Physical Activity: Not on file   Stress: Not on file   Social Connections: Not on file   Intimate Partner Violence: Not on file   Housing Stability: Not on file       Review of Systems: A comprehensive review of systems was negative except for that written in the HPI. Objective:      Visit Vitals  BP (!) 153/85 (BP 1 Location: Left upper arm, BP Patient Position: At rest;Lying)   Pulse 96   Temp 98.4 °F (36.9 °C)   Resp 18   Ht 5' 11\" (1.803 m)   Wt 103.6 kg (228 lb 6.3 oz)   SpO2 97%   BMI 31.85 kg/m²        Visit Vitals  BP (!) 153/85 (BP 1 Location: Left upper arm, BP Patient Position: At rest;Lying)   Pulse 96   Temp 98.4 °F (36.9 °C)   Resp 18   Ht 5' 11\" (1.803 m)   Wt 103.6 kg (228 lb 6.3 oz)   SpO2 97%   BMI 31.85 kg/m²     General:  Alert, cooperative, no distress, appears stated age. Head:  Normocephalic, without obvious abnormality, atraumatic. Eyes:  Conjunctivae/corneas clear. , EOMs intact. Lungs:   Clear to auscultation bilaterally. Heart:  Regular rate and rhythm, S1, S2 normal, no murmur, click, rub or gallop. Abdomen:   Soft, tender in RUQ. Bowel sounds normal. No masses,  No organomegaly.        Imaging:  images and reports reviewed    Lab Review:      Recent Results (from the past 24 hour(s))   CBC WITH AUTOMATED DIFF    Collection Time: 10/26/22  6:14 AM   Result Value Ref Range    WBC 11.3 (H) 4.1 - 11.1 K/uL    RBC 5.88 (H) 4.10 - 5.70 M/uL HGB 18.5 (H) 12.1 - 17.0 g/dL    HCT 49.6 36.6 - 50.3 %    MCV 84.4 80.0 - 99.0 FL    MCH 31.5 26.0 - 34.0 PG    MCHC 37.3 (H) 30.0 - 36.5 g/dL    RDW 11.9 11.5 - 14.5 %    PLATELET 145 (L) 540 - 400 K/uL    MPV 10.1 8.9 - 12.9 FL    NRBC 0.0 0  WBC    ABSOLUTE NRBC 0.00 0.00 - 0.01 K/uL    NEUTROPHILS 78 (H) 32 - 75 %    LYMPHOCYTES 15 12 - 49 %    MONOCYTES 6 5 - 13 %    EOSINOPHILS 1 0 - 7 %    BASOPHILS 0 0 - 1 %    IMMATURE GRANULOCYTES 0 0.0 - 0.5 %    ABS. NEUTROPHILS 8.8 (H) 1.8 - 8.0 K/UL    ABS. LYMPHOCYTES 1.7 0.8 - 3.5 K/UL    ABS. MONOCYTES 0.6 0.0 - 1.0 K/UL    ABS. EOSINOPHILS 0.1 0.0 - 0.4 K/UL    ABS. BASOPHILS 0.1 0.0 - 0.1 K/UL    ABS. IMM. GRANS. 0.0 0.00 - 0.04 K/UL    DF AUTOMATED     METABOLIC PANEL, COMPREHENSIVE    Collection Time: 10/26/22  6:14 AM   Result Value Ref Range    Sodium 132 (L) 136 - 145 mmol/L    Potassium 4.0 3.5 - 5.1 mmol/L    Chloride 98 97 - 108 mmol/L    CO2 25 21 - 32 mmol/L    Anion gap 9 5 - 15 mmol/L    Glucose 291 (H) 65 - 100 mg/dL    BUN 12 6 - 20 MG/DL    Creatinine 0.82 0.70 - 1.30 MG/DL    BUN/Creatinine ratio 15 12 - 20      eGFR >60 >60 ml/min/1.73m2    Calcium 10.1 8.5 - 10.1 MG/DL    Bilirubin, total 1.8 (H) 0.2 - 1.0 MG/DL    ALT (SGPT) 45 12 - 78 U/L    AST (SGOT) 21 15 - 37 U/L    Alk.  phosphatase 99 45 - 117 U/L    Protein, total 7.3 6.4 - 8.2 g/dL    Albumin 3.9 3.5 - 5.0 g/dL    Globulin 3.4 2.0 - 4.0 g/dL    A-G Ratio 1.1 1.1 - 2.2     NT-PRO BNP    Collection Time: 10/26/22  6:14 AM   Result Value Ref Range    NT pro- <125 PG/ML   TROPONIN-HIGH SENSITIVITY    Collection Time: 10/26/22  6:14 AM   Result Value Ref Range    Troponin-High Sensitivity 7 0 - 76 ng/L   LIPASE    Collection Time: 10/26/22  6:14 AM   Result Value Ref Range    Lipase 53 (L) 73 - 393 U/L   MAGNESIUM    Collection Time: 10/26/22  6:14 AM   Result Value Ref Range    Magnesium 1.8 1.6 - 2.4 mg/dL   TROPONIN-HIGH SENSITIVITY    Collection Time: 10/26/22  7:47 AM Result Value Ref Range    Troponin-High Sensitivity 7 0 - 76 ng/L       Labs and radiology images and reports reviewed      Assessment:     Acute Cholelithasis    Plan/Recommendations/Medical Decision Makin. I recommend laparoscopic cholecystectomy with possible cholangiogram  Treatment alternatives were discussed. 2. Discussed aspects of surgical intervention, methods, risks (including by not limited to infection, bleeding, retained gallstones in the abdominal cavity and/or the main bile ducts, and injury to adjacent structures including the biliary system, common bile duct, and intestines.)  The patient understands the risks, any and all questions were answered to the patient's satisfaction. 3. Patient does wish to proceed with surgery.

## 2022-10-26 NOTE — ED NOTES
Assumed care of pt. Pt reports mid chest pain starting yesterday making hit hard to take a deep breath, not able to belch and feels the need, unable to get comfortable due to pain this morning pain moved to right side of chest, brief episode of dizziness as getting to ED, pain 10/10, denies being short of air, no cough, denies any other s/s. Pt AOX4, PWD, VSS, SR on monitor.

## 2022-10-26 NOTE — PROGRESS NOTES
End of Shift Note    Bedside shift change report given to Neli Gamez (oncoming nurse) by Wild Conti RN (offgoing nurse). Report included the following information SBAR and Kardex    Shift worked:  [id number]     Shift summary and any significant changes:    Admitted to unit without incident. PRN jonathon x 1, PRN dilaudid x 1. Up to toilet with no issues. Plan to d/c tomorrow. Concerns for physician to address: Discharge?      Zone phone for oncoming shift:  [phone]               Wild Conti, RN

## 2022-10-26 NOTE — ANESTHESIA PREPROCEDURE EVALUATION
Anesthetic History   No history of anesthetic complications            Review of Systems / Medical History  Patient summary reviewed, nursing notes reviewed and pertinent labs reviewed    Pulmonary            Asthma : well controlled  Pertinent negatives: No smoker  Comments: Necrotizing pneumonia  Former smoker - Quit 1/27/15  Pleural effusion   Neuro/Psych   Within defined limits           Cardiovascular  Within defined limits                Exercise tolerance: >4 METS     GI/Hepatic/Renal  Within defined limits              Endo/Other    Diabetes: well controlled, type 2    Morbid obesity     Other Findings              Physical Exam    Airway  Mallampati: II  TM Distance: 4 - 6 cm  Neck ROM: decreased range of motion   Mouth opening: Normal     Cardiovascular  Regular rate and rhythm,  S1 and S2 normal,  no murmur, click, rub, or gallop             Dental         Pulmonary  Breath sounds clear to auscultation               Abdominal  GI exam deferred       Other Findings            Anesthetic Plan    ASA: 3  Anesthesia type: general          Induction: Intravenous  Anesthetic plan and risks discussed with: Patient

## 2022-10-26 NOTE — PERIOP NOTES
Handoff Report from Operating Room to PACU    Report received from [address] and Christine Cesar RN regarding Fidelina Fall. Surgeon(s):  Maureen Ray MD  And Procedure(s) (LRB):  CHOLECYSTECTOMY LAPAROSCOPIC WITH INTRAOPERATIVE WITH CHOLANGIOGRAM, LIVER BIOPSY (N/A)  confirmed   with allergies and dressings discussed. Anesthesia type, drugs, patient history, complications, estimated blood loss, vital signs, intake and output, and last pain medication, lines, and temperature were reviewed.     Report off to primary RN Maia Hatfield

## 2022-10-26 NOTE — PERIOP NOTES
1343 TRANSFER - OUT REPORT:    Verbal report given to Ruthann Vides RN(name) on Yesi Al  being transferred to Gen Surg (unit) for routine post - op       Report consisted of patients Situation, Background, Assessment and   Recommendations(SBAR). Information from the following report(s) SBAR, Kardex, OR Summary, Procedure Summary, Intake/Output, and MAR was reviewed with the receiving nurse. Opportunity for questions and clarification was provided.       Patient transported with:   Registered Nurse  Patient belongings   Patient chart  Security returned patient's belongings   Patient has cell[yumiko  Room air

## 2022-10-26 NOTE — OP NOTES
Laparoscopic Cholecystectomy with IOC Procedure Note    Patient: Liza Mccauley  YOB: 1970  MRN: [id number]    Date of Procedure: 10/26/2022       Proceedure: laparoscopic cholecystectomy with Cholangiogram, core needle biopsy of the liver    Pre-operative Diagnosis: Acute cholecystitis    Post-operative Diagnosis:  Same    Surgeon: Surgeon(s):  Reginald Patel MD    Assistant:  Brent Tan: Bert Ann  Circ-Relief: Florencio Alford RN  Radiology Technician: Mami Payton  Scrub RN-1: Vangie Warren  Scrub RN-Relief: Cindy Brady RN  Surg Asst-1: Aaliyah Manual    Anesthesia: General     Indications: This patient presents with a symptomatic gallbladder disease and will undergo laparoscopic cholecystecomy. Procedure Details   The patient was seen again in the Holding Room. The risks, benefits, complications, treatment options, and expected outcomes were discussed with the patient. The possibilities of reaction to medication, pulmonary aspiration, perforation of viscus, bleeding, recurrent infection, finding a normal gallbladder, the need for additional procedures, failure to diagnose a condition, the possible need to convert to an open procedure, and creating a complication requiring transfusion or operation were discussed with the patient. The patient and/or family concurred with the proposed plan, giving informed consent. The patient was taken to Operating Room, identified as Liza Mccauley and the procedure verified as Laparoscopic Cholecystectomy with possible Intraoperative Cholangiograms. A Time Out was held and the above information confirmed. Prior to the induction of general anesthesia,  antibiotic prophylaxis was administered. General endotracheal anesthesia was then administered and tolerated well. After the induction, the abdomen was prepped in the usual sterile fashion.      The abdomen was entered in the right upper quadrant in the midclavicular line just below the costal margin. At this site, 3 mL of 0.5% Marcaine was injected in the subcutaneous space. A 5-mm incision made with an 11-blade scalpel. Then a 5-mm Xcel trocar containing 5-mm 0-degree laparoscope was used to dissect through the layers of the abdominal wall under direct laparoscopic vision. Entry into the abdomen was confirmed visually. Once within the abdomen, insufflation was provided through this trocar to a pressure of 15 mmHg. The patient tolerated insufflation well and there were no apparent complications. A 360-degree evaluation of the abdomen was then performed from this trocar  site. There were no peritoneal implants identified. There were no abnormalities on the surface of the liver. Attention was then focused at the umbilicus. Marcaine plain 0.5% 3 mL was injected in the subcutaneous space, as well as the preperitoneal space. A 12-mm curvilinear incision was made at the base of the umbilicus, and then a 21-KO Xcel trocar was inserted under direct laparoscopic vision into the abdominal cavity. The camera was then switched to this trocar position. The patient was then placed in reverse Trendelenburg with right side up. Attention was focused at the right upper quadrant, and 2 additional trocars were placed. One 5-mm trocar was placed in the epigastrium just below the xyphyoid The third 5-mm trocar was placed in the anterior axillary line just below the costal margin. At both sites, 3 mL of 0.5% Marcaine was injected into the skin and the preperitoneal space, a 5-mm incision made, and then the 5-mm trocar inserted under direct laparoscopic vision. The fundus of the gallbladder was then grasped and retracted over the dome of the liver. The infundibulum was grasped and retracted to the right and inferiorly. Blunt dissection was used to dissect out the space between the infundibulum and the gallbladder bed, and then blunt dissection was used to dissect out the cystic duct and  cystic artery.  A critical view was obtained where these were the only 2 structures entering the gallbladder. Once this critical view was obtained, a clip was placed at the base of the infundibulum. A choledochotomy was made in the very distal cystic duct. Then a 14-gauge angiocatheter was used to introduce an Arrow cholangiogram catheter into the abdominal cavity. The catheter was irrigated with saline and then inserted into the choledochotomy. Once within the cystic duct, the balloon was inflated and the cystic duct irrigated with normal saline. There was no evidence of any leakage out through the choledochotomy, indicating good seal with the balloon. Of note, the saline irrigated easily without much resistance. All instruments were removed from the patient's abdomen. She was placed in a supine position and a C-arm was brought into the field. A  film was shot to obtain proper orientation and alignment of the C-arm, and then, under real-time fluoroscopy, contrast was injected slowly by hand. Contrast was seen flowing through the cystic duct and into a common bile duct. There was no filling defect seen in the common bile duct and the duct was not dilated. Good  flow into the  duodenum was visualized. Contrast easily refluxed up the common hepatic  duct and into right and left hepatic ducts and into secondary and tertiary  biliary radicles within the liver without signs of filling defects. The C-arm was removed, the cholangiogram catheter removed. Two clips were  placed on the proximal cystic duct and then the choledochotomy  completed with laparoscopic scissors. The artery was clipped and divided in a similar fashion. The infundibulum of the gallbladder was then retracted towards the anterior abdominal wall and the gallbladder removed from the gallbladder fossa with electrocautery. The gallbladder was then placed over the right lobe of the liver. An Endocatch bag was then inserted through the umbilical trochar. The gallbladder was then placed in the EndoCatch bag and removed through the umbilical trocar site. The gallbladder was then sent to Pathology. Next, the right upper quadrant was inspected. The gallbladder fossa appeared dry. There was no evidence of any bleeding. The cystic duct remnant had 2 clips across it, and an Endoloop, with no evidence of any leakage of bile. The cystic artery remnant was inspected. It had 2 clips across it with no evidence of any bleeding. The right upper quadrant was then irrigated with 300 mL of normal saline. The irrigation came back  clear. Attention was then focused on the umbilical trocar site, and the fascia at  this umbilical trocar site was closed with a transfascial sutures of 0  Vicryl. This was done using an Endo Close device under direct laparoscopic  vision. Once this suture was tied, there was no loss of pneumoperitoneum through the umbilical trocar site and no palpable defect, indicating adequate closure of the trocar site. Pneumoinsufflation was then vented through the 5-mm trocar sites. The trocars were then removed and the skin at all trocar sites closed with 4-0 Monocryl and Dermabond. The patient was extubated in the room and taken to the [address] [facility]  Unit in stable condition. Instrument, sponge, and needle counts were correct x2. Findings:  Cholecystitis with Cholelithiasis  Normal cholangiogram  Cirrhotic appearing liver      Estimated Blood Loss:  None           Drains:  * No LDAs found *           Specimens: Gallbladder, core needle of liver          Complications:  None; patient tolerated the procedure well. Implants: * No implants in log *    Disposition: PACU - hemodynamically stable.            Condition: stable      Electronically Signed by Macario Chávez MD on 10/26/2022 at 11:51 AM

## 2022-10-27 VITALS
WEIGHT: 228.4 LBS | TEMPERATURE: 98.2 F | HEIGHT: 71 IN | RESPIRATION RATE: 17 BRPM | DIASTOLIC BLOOD PRESSURE: 63 MMHG | OXYGEN SATURATION: 98 % | BODY MASS INDEX: 31.98 KG/M2 | SYSTOLIC BLOOD PRESSURE: 108 MMHG | HEART RATE: 82 BPM

## 2022-10-27 LAB
ANION GAP SERPL CALC-SCNC: 7 MMOL/L (ref 5–15)
BUN SERPL-MCNC: 20 MG/DL (ref 6–20)
BUN/CREAT SERPL: 21 (ref 12–20)
CALCIUM SERPL-MCNC: 8.8 MG/DL (ref 8.5–10.1)
CHLORIDE SERPL-SCNC: 96 MMOL/L (ref 97–108)
CO2 SERPL-SCNC: 27 MMOL/L (ref 21–32)
CREAT SERPL-MCNC: 0.96 MG/DL (ref 0.7–1.3)
ERYTHROCYTE [DISTWIDTH] IN BLOOD BY AUTOMATED COUNT: 12.2 % (ref 11.5–14.5)
EST. AVERAGE GLUCOSE BLD GHB EST-MCNC: 252 MG/DL
GLUCOSE SERPL-MCNC: 380 MG/DL (ref 65–100)
HBA1C MFR BLD: 10.4 % (ref 4–5.6)
HCT VFR BLD AUTO: 45.5 % (ref 36.6–50.3)
HGB BLD-MCNC: 16.2 G/DL (ref 12.1–17)
MCH RBC QN AUTO: 30.9 PG (ref 26–34)
MCHC RBC AUTO-ENTMCNC: 35.6 G/DL (ref 30–36.5)
MCV RBC AUTO: 86.7 FL (ref 80–99)
NRBC # BLD: 0 K/UL (ref 0–0.01)
NRBC BLD-RTO: 0 PER 100 WBC
PLATELET # BLD AUTO: 112 K/UL (ref 150–400)
PMV BLD AUTO: 10.7 FL (ref 8.9–12.9)
POTASSIUM SERPL-SCNC: 4.7 MMOL/L (ref 3.5–5.1)
RBC # BLD AUTO: 5.25 M/UL (ref 4.1–5.7)
SODIUM SERPL-SCNC: 130 MMOL/L (ref 136–145)
WBC # BLD AUTO: 13.1 K/UL (ref 4.1–11.1)

## 2022-10-27 PROCEDURE — 74011250637 HC RX REV CODE- 250/637: Performed by: SURGERY

## 2022-10-27 PROCEDURE — 74011000250 HC RX REV CODE- 250: Performed by: SURGERY

## 2022-10-27 PROCEDURE — 85027 COMPLETE CBC AUTOMATED: CPT

## 2022-10-27 PROCEDURE — 83036 HEMOGLOBIN GLYCOSYLATED A1C: CPT

## 2022-10-27 PROCEDURE — 36415 COLL VENOUS BLD VENIPUNCTURE: CPT

## 2022-10-27 PROCEDURE — 80048 BASIC METABOLIC PNL TOTAL CA: CPT

## 2022-10-27 PROCEDURE — G0378 HOSPITAL OBSERVATION PER HR: HCPCS

## 2022-10-27 RX ORDER — OXYCODONE HYDROCHLORIDE 5 MG/1
5 TABLET ORAL
Qty: 10 TABLET | Refills: 0 | Status: SHIPPED | OUTPATIENT
Start: 2022-10-27 | End: 2022-10-30

## 2022-10-27 RX ORDER — METFORMIN HYDROCHLORIDE 1000 MG/1
1000 TABLET ORAL 2 TIMES DAILY WITH MEALS
Qty: 60 TABLET | Refills: 2 | Status: SHIPPED | OUTPATIENT
Start: 2022-10-27 | End: 2022-11-26

## 2022-10-27 RX ADMIN — OXYCODONE 5 MG: 5 TABLET ORAL at 06:25

## 2022-10-27 RX ADMIN — Medication 1 AMPULE: at 01:02

## 2022-10-27 RX ADMIN — SENNOSIDES AND DOCUSATE SODIUM 1 TABLET: 50; 8.6 TABLET ORAL at 09:17

## 2022-10-27 RX ADMIN — SODIUM CHLORIDE, PRESERVATIVE FREE 10 ML: 5 INJECTION INTRAVENOUS at 06:25

## 2022-10-27 RX ADMIN — OXYCODONE 5 MG: 5 TABLET ORAL at 01:02

## 2022-10-27 RX ADMIN — METFORMIN HYDROCHLORIDE 1000 MG: 500 TABLET ORAL at 09:17

## 2022-10-27 NOTE — PROGRESS NOTES
DISCHARGE NOTE FROM [facility] NURSE    Patient determined to be stable for discharge by attending provider. I have reviewed the discharge instructions and follow-up appointments with the patient. They verbalized understanding and all questions were answered to their satisfaction. No complaints or further questions were expressed. Medications sent to pharmacy. Appropriate educational materials and medication side effect teaching were provided. PIV were removed prior to discharge. Patient did not discharge with any line, leal, or drain. All personal items collected during admission were returned to the patient prior to discharge.     Post-op patient: yes        Kayli De RN

## 2022-10-27 NOTE — DISCHARGE SUMMARY
Post- Surgical Discharge Summary    Patient ID:  Jimmie White  [id number]  male  47 y.o.  1970    Admit date: 10/26/2022    Discharge date: 10/27/22     Admitting Physician: Alley Solis MD     Consulting Physician(s):   Treatment Team: Attending Provider: Gabriela Coronel MD; Consulting Provider: Gabriela Coronel MD; Primary Nurse: Kendall Arguelles LPN; Primary Nurse: Elsa Walden, RN; Staff Nurse: Hilary Cabrales RN; Utilization Review: Gisselle Roa RN    Date of Surgery:   10/26/2022     Preoperative Diagnosis:  UNKNOWN    Postoperative Diagnosis:   Acute cholecystitis, possible cirrhosis, diabetes mellitus      Procedure(s):  CHOLECYSTECTOMY LAPAROSCOPIC WITH INTRAOPERATIVE WITH CHOLANGIOGRAM, LIVER BIOPSY     Anesthesia Type:   General     Surgeon: Gabriela Coronel MD                            HPI:  Pt is a 47 y.o. male who has a history of UNKNOWN who presents at this time for a lap [place]yenne. Problem List:   Problem List as of 10/27/2022 Date Reviewed: 10/26/2022            Codes Class Noted - Resolved    Acute cholecystitis ICD-10-CM: K81.0  ICD-9-CM: 575.0  10/26/2022 - Present        Secondary DM without complications, uncontrolled ICD-10-CM: SSH9714  ICD-9-CM: Alecia Hurl  2/29/2016 - Present        Encounter for long-term (current) use of medications ICD-10-CM: Z79.899  ICD-9-CM: V58.69  2/29/2016 - Present        Necrotizing pneumonia ([facility] [address] ICD-10-CM: J85.0  ICD-9-CM: 513.0  2/1/2016 - Present        Respiratory distress ICD-10-CM: R06.03  ICD-9-CM: 786.09  2/1/2016 - Present        Thrombocytopenia ([facility] [address] ICD-10-CM: D69.6  ICD-9-CM: 287.5  2/1/2016 - Present            Hospital Course: The patient underwent surgery. Intra-operative complications: None; patient tolerated the procedure well. Was taken to the PACU in stable condition and then transferred to the surgical floor. Pathology is pending. Surgeon will follow results as outpatient.     Perioperative Antibiotics: Piperacillin/Tazobactam    Postoperative Pain Management:  Oxycodone     Postoperative transfusions:    Number of units banked PRBCs =   none     Post Op complications: None     Incisions  - clean, dry and intact. No significant erythema or swelling. Wound(s) appear to be healing without any evidence of infection. Patient mobilized with nursing and was found to be safe and steady with ambulation. Discharged to: Home     Condition on Discharge: Stable     Discharge instructions:    - Take pain medications as prescribed  - Diet Regular  - Discharge activity:    - Activity as tolerated    - Ambulate several times a day   - No heavy lifting for 4 weeks   - Do not drive for two weeks or while on opioid pain medications  - Wound Care: Keep wound(s) clean and dry. See discharge instruction sheet. Allergies:  No Known Allergies           -DISCHARGE MEDICATION LIST     Current Discharge Medication List       per medical continuation form      -Follow up in office in 2 weeks      Signed:  Bobby Watts. Regulo Dotson  MSN, APRN, FNP-C, [address]  Surgical Nurse Practitioner    10/27/2022  8:48 AM    Patient did well postoperatively but perioperatively was noted to have hyperglycemia likely diabetes mellitus looks like he was treated for this 5 years ago but lost to follow-up. He was treated with oral hypoglycemics. In addition intraoperatively Dr. Oleg Rushing had noted some evidence of possible cirrhosis, biopsy pending. Lengthy discussion with patient about need for primary care follow-up regarding diabetes and liver issues and likely gastroenterology referral and to avoid alcohol and other liver toxic medications such as Tylenol. Patient seemed understand all of these issues, he denies any significant alcohol use currently and only short course of heavy alcohol use in the past.  No evidence of cholecystectomy complications, wounds are clean. Discharge home.   Abdomen soft incisions clean Dermabond intact sclera and patient looks comfortable is tolerating diet.     Time with patient and discussion and exam, 25 minutes

## 2022-10-27 NOTE — PROGRESS NOTES
End of Shift Note    Bedside shift change report given to Andrez Zimmerman RN  (oncoming nurse) by Yodit Elias LPN (offgoing nurse). Report included the following information SBAR, Intake/Output, MAR, Accordion, Recent Results, and Med Rec Status    Shift worked:  7p-7a     Shift summary and any significant changes:    Pt c/o pain x3, given oxycodone x3. Pt ambulated to bathroom x2. Trocar sites are CDI, Pt c/o RLQ pain      Concerns for physician to address:  WBC 13.1, Sodium 130     Zone phone for oncoming shift:   [phone]       Activity:  Activity Level: Up with Assistance  Number times ambulated in hallways past shift: 0  Number of times OOB to chair past shift: 0    Cardiac:   Cardiac Monitoring: No      Cardiac Rhythm: Sinus Rhythm    Access:  Current line(s): PIV     Genitourinary:   Urinary status: voiding    Respiratory:   O2 Device: None (Room air)  Chronic home O2 use?: NO  Incentive spirometer at bedside: NO       GI:  Last Bowel Movement Date: 10/25/22  Current diet:  ADULT DIET Regular; 4 carb choices (60 gm/meal)  DIET ONE TIME MESSAGE  Passing flatus: YES  Tolerating current diet: YES       Pain Management:   Patient states pain is manageable on current regimen: YES    Skin:  Bony Score: 22  Interventions: increase time out of bed    Patient Safety:  Fall Score:  Total Score: 1  Interventions: pt to call before getting OOB       Length of Stay:  Expected LOS: - - -  Actual LOS: 0      Yodit Elias LPN

## 2022-10-27 NOTE — PROGRESS NOTES
CM finished IDR and went to speak to pt; however, nursing reported pt was anxious and had already left.       Myriam Self, MSW  Care Management, [address]

## 2022-10-27 NOTE — PROGRESS NOTES
Problem: Falls - Risk of  Goal: *Absence of Falls  Description: Document Theresa Embs Fall Risk and appropriate interventions in the flowsheet.   Outcome: Progressing Towards Goal  Note: Fall Risk Interventions:            Medication Interventions: Teach patient to arise slowly

## 2022-11-02 ENCOUNTER — TELEPHONE (OUTPATIENT)
Dept: SURGERY | Age: 52
End: 2022-11-02

## 2022-11-02 NOTE — TELEPHONE ENCOUNTER
Patient called and is wanting liver biopsy results, patient had surgery on 10/26.  I offered patient post op appointment and patient stated he needed his biopsy results and did not schedule follow up, please call    [phone]

## 2023-10-29 ENCOUNTER — HOSPITAL ENCOUNTER (EMERGENCY)
Facility: HOSPITAL | Age: 53
Discharge: HOME | End: 2023-10-29
Payer: MEDICAID

## 2023-10-29 VITALS
OXYGEN SATURATION: 99 % | DIASTOLIC BLOOD PRESSURE: 91 MMHG | HEART RATE: 89 BPM | BODY MASS INDEX: 33.87 KG/M2 | RESPIRATION RATE: 18 BRPM | TEMPERATURE: 98.2 F | HEIGHT: 70 IN | WEIGHT: 236.55 LBS | SYSTOLIC BLOOD PRESSURE: 166 MMHG

## 2023-10-29 DIAGNOSIS — B35.1 FUNGAL INFECTION OF TOENAIL: Primary | ICD-10-CM

## 2023-10-29 PROCEDURE — 99282 EMERGENCY DEPT VISIT SF MDM: CPT

## 2023-10-29 ASSESSMENT — PAIN SCALES - GENERAL: PAINLEVEL_OUTOF10: 0

## 2023-10-29 NOTE — ED NOTES
Pt reports 3-4 years of nail thickness. States he was treating it with antifungal cream/gel. Today he states he noticed the nail turned black and was \"loose\". Denies pain. Just states he is worried about the R big toe.       Natasha Degroot RN  10/29/23 1958

## 2023-10-30 NOTE — ED PROVIDER NOTES
[address] [place] [address]  [phone]    If symptoms worsen    [facility]  [address]  [phone]        Kavita Blanton MD  [facility] [address] [facility]  [address] [place] [address] [phone]    Call in 1 day      [facility] EMERGENCY DEPT  [address]  [address] [facility] [address]  [phone]    If symptoms worsen       DISCHARGE MEDICATIONS:     Medication List        ASK your doctor about these medications      amLODIPine 5 MG tablet  Commonly known as: NORVASC     metoprolol tartrate 25 MG tablet  Commonly known as: LOPRESSOR     polyethylene glycol 17 GM/SCOOP powder  Commonly known as: GLYCOLAX     senna-docusate 8.6-50 MG per tablet  Commonly known as: PERICOLACE     traZODone 100 MG tablet  Commonly known as: DESYREL                DISCONTINUED MEDICATIONS:  Discharge Medication List as of 10/29/2023  9:05 PM        STOP taking these medications       ondansetron (ZOFRAN-ODT) 4 MG disintegrating tablet Comments:   Reason for Stopping:               I have seen and evaluated the patient autonomously. My supervision physician was on site and available for consultation if needed. I am the Primary Clinician of Record. JORDANA Valentin NP (electronically signed)    (Please note that parts of this dictation were completed with voice recognition software. Quite often unanticipated grammatical, syntax, homophones, and other interpretive errors are inadvertently transcribed by the computer software. Please disregards these errors.  Please excuse any errors that have escaped final proofreading.)         JORDANA aVlentin NP  11/01/23 3376

## 2024-03-26 ENCOUNTER — HOSPITAL ENCOUNTER (EMERGENCY)
Facility: HOSPITAL | Age: 54
Discharge: HOME | End: 2024-03-26
Payer: MEDICAID

## 2024-03-26 ENCOUNTER — APPOINTMENT (OUTPATIENT)
Facility: HOSPITAL | Age: 54
End: 2024-03-26
Payer: MEDICAID

## 2024-03-26 VITALS
TEMPERATURE: 98.2 F | SYSTOLIC BLOOD PRESSURE: 170 MMHG | WEIGHT: 237.88 LBS | OXYGEN SATURATION: 99 % | RESPIRATION RATE: 16 BRPM | BODY MASS INDEX: 33.3 KG/M2 | DIASTOLIC BLOOD PRESSURE: 88 MMHG | HEIGHT: 71 IN | HEART RATE: 98 BPM

## 2024-03-26 DIAGNOSIS — S89.92XA INJURY OF LEFT KNEE, INITIAL ENCOUNTER: ICD-10-CM

## 2024-03-26 DIAGNOSIS — M25.562 ACUTE PAIN OF LEFT KNEE: Primary | ICD-10-CM

## 2024-03-26 PROCEDURE — 90471 IMMUNIZATION ADMIN: CPT

## 2024-03-26 PROCEDURE — 99284 EMERGENCY DEPT VISIT MOD MDM: CPT

## 2024-03-26 PROCEDURE — 90714 TD VACC NO PRESV 7 YRS+ IM: CPT

## 2024-03-26 PROCEDURE — 6360000002 HC RX W HCPCS

## 2024-03-26 PROCEDURE — 73562 X-RAY EXAM OF KNEE 3: CPT

## 2024-03-26 PROCEDURE — 96372 THER/PROPH/DIAG INJ SC/IM: CPT

## 2024-03-26 RX ORDER — ACETAMINOPHEN 500 MG
1000 TABLET ORAL EVERY 6 HOURS PRN
Qty: 30 TABLET | Refills: 0 | Status: SHIPPED | OUTPATIENT
Start: 2024-03-26

## 2024-03-26 RX ORDER — TETANUS AND DIPHTHERIA TOXOIDS ADSORBED 2; 2 [LF]/.5ML; [LF]/.5ML
0.5 INJECTION INTRAMUSCULAR ONCE
Status: COMPLETED | OUTPATIENT
Start: 2024-03-26 | End: 2024-03-26

## 2024-03-26 RX ORDER — NAPROXEN 500 MG/1
500 TABLET ORAL 2 TIMES DAILY WITH MEALS
Qty: 30 TABLET | Refills: 0 | Status: SHIPPED | OUTPATIENT
Start: 2024-03-26

## 2024-03-26 RX ADMIN — TETANUS AND DIPHTHERIA TOXOIDS ADSORBED 0.5 ML: 2; 2 INJECTION INTRAMUSCULAR at 20:56

## 2024-03-26 ASSESSMENT — PAIN DESCRIPTION - PAIN TYPE: TYPE: ACUTE PAIN

## 2024-03-26 ASSESSMENT — PAIN - FUNCTIONAL ASSESSMENT: PAIN_FUNCTIONAL_ASSESSMENT: 0-10

## 2024-03-26 ASSESSMENT — PAIN SCALES - GENERAL: PAINLEVEL_OUTOF10: 5

## 2024-03-26 ASSESSMENT — PAIN DESCRIPTION - ORIENTATION: ORIENTATION: LEFT

## 2024-03-26 ASSESSMENT — PAIN DESCRIPTION - LOCATION: LOCATION: KNEE

## 2024-03-26 ASSESSMENT — PAIN DESCRIPTION - DESCRIPTORS: DESCRIPTORS: ACHING

## 2024-03-26 NOTE — ED PROVIDER NOTES
General: Abdomen is flat.   Musculoskeletal:      Cervical back: Normal range of motion.        Legs:       Comments: Tenderness, swelling, ecchymosis to medial portion of left knee with superficial abrasion.  Full intact active range of motion.  Lower extremity strength is 5/5, equal bilaterally.  Distal sensation intact.  Palpable distal dorsalis pedis.  Patient is ambulatory.   Skin:     General: Skin is warm and dry.   Neurological:      General: No focal deficit present.      Mental Status: He is alert and oriented to person, place, and time.   Psychiatric:         Mood and Affect: Mood normal.         Behavior: Behavior normal.          DIAGNOSTIC RESULTS   LABS:     No results found for this or any previous visit (from the past 24 hour(s)).      EKG: When ordered, EKG's are interpreted by the Emergency Department Physician in the absence of a cardiologist.  Please see their note for interpretation of EKG.      RADIOLOGY:  Non-plain film images such as CT, Ultrasound and MRI are read by the radiologist. Plain radiographic images are visualized and preliminarily interpreted by the ED Provider with the below findings:        Interpretation per the Radiologist below, if available at the time of this note:     XR KNEE LEFT (3 VIEWS)    Result Date: 3/26/2024  EXAM: XR KNEE LEFT (3 VIEWS) INDICATION: Left knee pain after injury. COMPARISON: None. FINDINGS: Three views of the left knee demonstrate no fracture or other acute osseous or articular abnormality. There is a suprapatellar joint effusion. Small patellar osteophytes. No erosion or chondrocalcinosis. Nonspecific prepatellar soft tissue swelling.     No fracture.      PROCEDURES   Unless otherwise noted below, none  Procedures     CRITICAL CARE TIME   Patient does not meet Critical Care Time, 0 minutes     EMERGENCY DEPARTMENT COURSE and DIFFERENTIAL DIAGNOSIS/MDM   Vitals:    Vitals:    03/26/24 1735 03/26/24 1737   BP:  (!) 170/88   Pulse:  98   Resp:  16

## 2024-03-27 NOTE — ED NOTES
Patient with discharge papers.  Instructions given by attending provider and can be discharged after TT injection.

## 2024-03-29 ENCOUNTER — OFFICE VISIT (OUTPATIENT)
Age: 54
End: 2024-03-29
Payer: MEDICAID

## 2024-03-29 VITALS — WEIGHT: 237 LBS | HEIGHT: 71 IN | BODY MASS INDEX: 33.18 KG/M2

## 2024-03-29 DIAGNOSIS — M25.562 LEFT KNEE PAIN, UNSPECIFIED CHRONICITY: Primary | ICD-10-CM

## 2024-03-29 DIAGNOSIS — S80.02XA CONTUSION OF LEFT KNEE, INITIAL ENCOUNTER: ICD-10-CM

## 2024-03-29 PROCEDURE — 99204 OFFICE O/P NEW MOD 45 MIN: CPT | Performed by: ORTHOPAEDIC SURGERY

## 2024-03-29 RX ORDER — DICLOFENAC SODIUM 75 MG/1
75 TABLET, DELAYED RELEASE ORAL 2 TIMES DAILY PRN
Qty: 60 TABLET | Refills: 0 | Status: SHIPPED | OUTPATIENT
Start: 2024-03-29

## 2024-03-29 ASSESSMENT — PATIENT HEALTH QUESTIONNAIRE - PHQ9
SUM OF ALL RESPONSES TO PHQ QUESTIONS 1-9: 0
SUM OF ALL RESPONSES TO PHQ9 QUESTIONS 1 & 2: 0
SUM OF ALL RESPONSES TO PHQ QUESTIONS 1-9: 0
SUM OF ALL RESPONSES TO PHQ QUESTIONS 1-9: 0
1. LITTLE INTEREST OR PLEASURE IN DOING THINGS: NOT AT ALL
SUM OF ALL RESPONSES TO PHQ QUESTIONS 1-9: 0
2. FEELING DOWN, DEPRESSED OR HOPELESS: NOT AT ALL

## 2024-03-29 NOTE — PROGRESS NOTES
Identified pt with two pt identifiers (name and ). Reviewed chart in preparation for visit and have obtained necessary documentation.    Milton Hughes is a 53 y.o. male Pain (Left Knee pain )  .    Vitals:    24 0755   Weight: 107.5 kg (237 lb)   Height: 1.803 m (5' 10.98\")          1. Have you been to the ER, urgent care clinic since your last visit?  Hospitalized since your last visit?  yes - ER     2. Have you seen or consulted any other health care providers outside of the [facility] since your last visit?  Include any pap smears or colon screening.  no

## 2024-03-29 NOTE — PROGRESS NOTES
3/29/2024    Chief Complaint: Left knee pain    HPI: This is a(n) 53 y.o. male  who complains of left knee pain.  Onset was sudden when his knee was caught between two boards and he fell to the side while carrying them.  The patient has had pain for four days.   The pain is in the medial knee, it is severe in intensity.   The patient has tried activity modification, no physical therapy, injections have not been done.   The pain causes some limitation with walking.   The patient complains of feelings of instability in the knee.      Past Medical History:   Diagnosis Date    Asthma     DM (diabetes mellitus) ([facility]) 02/29/2016    Other ill-defined conditions(799.89) neck injury       Past Surgical History:   Procedure Laterality Date    CHEST SURGERY         Current Outpatient Medications on File Prior to Visit   Medication Sig Dispense Refill    naproxen (NAPROSYN) 500 MG tablet Take 1 tablet by mouth 2 times daily (with meals) 30 tablet 0    acetaminophen (TYLENOL) 500 MG tablet Take 2 tablets by mouth every 6 hours as needed for Pain 30 tablet 0    amLODIPine (NORVASC) 5 MG tablet Take 5 mg by mouth daily (Patient not taking: Reported on 3/29/2024)      metoprolol tartrate (LOPRESSOR) 25 MG tablet TAKE ONE TABLET BY MOUTH EVERY 12 HOURS (Patient not taking: Reported on 3/29/2024)      polyethylene glycol (GLYCOLAX) 17 GM/SCOOP powder Take 17 g by mouth 2 times daily (Patient not taking: Reported on 3/29/2024)      senna-docusate (PERICOLACE) 8.6-50 MG per tablet Take 1 tablet by mouth daily (Patient not taking: Reported on 3/29/2024)      traZODone (DESYREL) 100 MG tablet Take 100 mg by mouth (Patient not taking: Reported on 3/29/2024)       No current facility-administered medications on file prior to visit.       No Known Allergies    Family History   Problem Relation Age of Onset    Diabetes Father     Asthma Mother     Cancer Mother        Social History     Socioeconomic History    Marital status: Single

## 2024-04-10 ENCOUNTER — OFFICE VISIT (OUTPATIENT)
Age: 54
End: 2024-04-10
Payer: MEDICAID

## 2024-04-10 VITALS — BODY MASS INDEX: 33.07 KG/M2 | HEIGHT: 71 IN

## 2024-04-10 DIAGNOSIS — S80.02XA CONTUSION OF LEFT KNEE, INITIAL ENCOUNTER: Primary | ICD-10-CM

## 2024-04-10 PROCEDURE — 99212 OFFICE O/P EST SF 10 MIN: CPT | Performed by: ORTHOPAEDIC SURGERY

## 2024-04-10 ASSESSMENT — PATIENT HEALTH QUESTIONNAIRE - PHQ9
SUM OF ALL RESPONSES TO PHQ QUESTIONS 1-9: 0
2. FEELING DOWN, DEPRESSED OR HOPELESS: NOT AT ALL
SUM OF ALL RESPONSES TO PHQ9 QUESTIONS 1 & 2: 0
1. LITTLE INTEREST OR PLEASURE IN DOING THINGS: NOT AT ALL
SUM OF ALL RESPONSES TO PHQ QUESTIONS 1-9: 0

## 2024-04-10 NOTE — PROGRESS NOTES
4/10/2024      CC: Left knee pain    HPI:      This is a 53 y.o. year old male who presents for a follow up visit.  The patient was last seen and diagnosed with fusion left knee.   The patient's treatments since the most recent visit have comprised of diclofenac, gentle range of motion exercises.   The patient has had moderate relief of the chief complaint.        PMH:  Past Medical History:   Diagnosis Date    Asthma     DM (diabetes mellitus) ([facility]) 2016    Other ill-defined conditions(799.89) neck injury       PSxHx:  Past Surgical History:   Procedure Laterality Date    CHEST SURGERY         Meds:    Current Outpatient Medications:     diclofenac (VOLTAREN) 75 MG EC tablet, Take 1 tablet by mouth 2 times daily as needed for Pain, Disp: 60 tablet, Rfl: 0    naproxen (NAPROSYN) 500 MG tablet, Take 1 tablet by mouth 2 times daily (with meals), Disp: 30 tablet, Rfl: 0    acetaminophen (TYLENOL) 500 MG tablet, Take 2 tablets by mouth every 6 hours as needed for Pain, Disp: 30 tablet, Rfl: 0    amLODIPine (NORVASC) 5 MG tablet, Take 1 tablet by mouth daily, Disp: , Rfl:     metoprolol tartrate (LOPRESSOR) 25 MG tablet, , Disp: , Rfl:     polyethylene glycol (GLYCOLAX) 17 GM/SCOOP powder, Take 17 g by mouth 2 times daily, Disp: , Rfl:     senna-docusate (PERICOLACE) 8.6-50 MG per tablet, Take 1 tablet by mouth daily, Disp: , Rfl:     traZODone (DESYREL) 100 MG tablet, Take 1 tablet by mouth, Disp: , Rfl:     All:  No Known Allergies    Social Hx:  Social History     Socioeconomic History    Marital status: Single     Spouse name: None    Number of children: None    Years of education: None    Highest education level: None   Tobacco Use    Smoking status: Former     Current packs/day: 0.00     Types: Cigarettes     Quit date: 2015     Years since quittin.2    Smokeless tobacco: Never   Substance and Sexual Activity    Alcohol use: No    Drug use: No       Family Hx:  Family History   Problem Relation Age

## 2024-04-10 NOTE — PROGRESS NOTES
Identified pt with two pt identifiers (name and ). Reviewed chart in preparation for visit and have obtained necessary documentation.    Milton Hughes is a 53 y.o. male Knee Pain (Left Knee )  .    Vitals:    04/10/24 0804   Height: 1.803 m (5' 10.98\")          1. Have you been to the ER, urgent care clinic since your last visit?  Hospitalized since your last visit?  no     2. Have you seen or consulted any other health care providers outside of the [facility] since your last visit?  Include any pap smears or colon screening.  no

## 2024-04-24 ENCOUNTER — OFFICE VISIT (OUTPATIENT)
Age: 54
End: 2024-04-24
Payer: MEDICAID

## 2024-04-24 VITALS — HEIGHT: 71 IN | BODY MASS INDEX: 33.07 KG/M2

## 2024-04-24 DIAGNOSIS — S80.02XA CONTUSION OF LEFT KNEE, INITIAL ENCOUNTER: Primary | ICD-10-CM

## 2024-04-24 PROCEDURE — 99212 OFFICE O/P EST SF 10 MIN: CPT | Performed by: ORTHOPAEDIC SURGERY

## 2024-04-24 ASSESSMENT — PATIENT HEALTH QUESTIONNAIRE - PHQ9
SUM OF ALL RESPONSES TO PHQ QUESTIONS 1-9: 0
SUM OF ALL RESPONSES TO PHQ9 QUESTIONS 1 & 2: 0
2. FEELING DOWN, DEPRESSED OR HOPELESS: NOT AT ALL
SUM OF ALL RESPONSES TO PHQ QUESTIONS 1-9: 0
SUM OF ALL RESPONSES TO PHQ QUESTIONS 1-9: 0
1. LITTLE INTEREST OR PLEASURE IN DOING THINGS: NOT AT ALL
SUM OF ALL RESPONSES TO PHQ QUESTIONS 1-9: 0

## 2024-04-24 NOTE — PROGRESS NOTES
Identified pt with two pt identifiers (name and ). Reviewed chart in preparation for visit and have obtained necessary documentation.    Milton Hughes is a 53 y.o. male Knee Pain (Left Knee )  .    Vitals:    24 0736   Height: 1.803 m (5' 10.98\")          1. Have you been to the ER, urgent care clinic since your last visit?  Hospitalized since your last visit?  no     2. Have you seen or consulted any other health care providers outside of the [facility] since your last visit?  Include any pap smears or colon screening.  no

## 2024-04-24 NOTE — PROGRESS NOTES
2024      CC: Left knee pain    HPI:      This is a 53 y.o. year old male who presents for a follow up visit.  The patient was last seen and diagnosed with contusion left knee.   The patient's treatments since the most recent visit have comprised of diclofenac, he feels as though this is not helping much.   The patient has had in general quite good relief of the chief complaint.  He feels as though straight walking is perfectly fine for him, some twisting does induce pain.      PMH:  Past Medical History:   Diagnosis Date    Asthma     DM (diabetes mellitus) ([facility]) 2016    Other ill-defined conditions(799.89) neck injury       PSxHx:  Past Surgical History:   Procedure Laterality Date    CHEST SURGERY         Meds:    Current Outpatient Medications:     diclofenac (VOLTAREN) 75 MG EC tablet, Take 1 tablet by mouth 2 times daily as needed for Pain, Disp: 60 tablet, Rfl: 0    naproxen (NAPROSYN) 500 MG tablet, Take 1 tablet by mouth 2 times daily (with meals), Disp: 30 tablet, Rfl: 0    acetaminophen (TYLENOL) 500 MG tablet, Take 2 tablets by mouth every 6 hours as needed for Pain, Disp: 30 tablet, Rfl: 0    amLODIPine (NORVASC) 5 MG tablet, Take 1 tablet by mouth daily, Disp: , Rfl:     metoprolol tartrate (LOPRESSOR) 25 MG tablet, , Disp: , Rfl:     polyethylene glycol (GLYCOLAX) 17 GM/SCOOP powder, Take 17 g by mouth 2 times daily, Disp: , Rfl:     senna-docusate (PERICOLACE) 8.6-50 MG per tablet, Take 1 tablet by mouth daily, Disp: , Rfl:     traZODone (DESYREL) 100 MG tablet, Take 1 tablet by mouth, Disp: , Rfl:     All:  No Known Allergies    Social Hx:  Social History     Socioeconomic History    Marital status: Single     Spouse name: None    Number of children: None    Years of education: None    Highest education level: None   Tobacco Use    Smoking status: Former     Current packs/day: 0.00     Types: Cigarettes     Quit date: 2015     Years since quittin.2    Smokeless tobacco: Never

## 2024-11-29 ENCOUNTER — APPOINTMENT (OUTPATIENT)
Facility: HOSPITAL | Age: 54
DRG: 872 | End: 2024-11-29
Payer: MEDICAID

## 2024-11-29 ENCOUNTER — HOSPITAL ENCOUNTER (INPATIENT)
Facility: HOSPITAL | Age: 54
LOS: 13 days | Discharge: HOME | DRG: 872 | End: 2024-12-12
Attending: EMERGENCY MEDICINE | Admitting: STUDENT IN AN ORGANIZED HEALTH CARE EDUCATION/TRAINING PROGRAM
Payer: MEDICAID

## 2024-11-29 ENCOUNTER — APPOINTMENT (OUTPATIENT)
Facility: HOSPITAL | Age: 54
DRG: 872 | End: 2024-11-29
Attending: STUDENT IN AN ORGANIZED HEALTH CARE EDUCATION/TRAINING PROGRAM
Payer: MEDICAID

## 2024-11-29 DIAGNOSIS — L08.9 RIGHT FOOT INFECTION: ICD-10-CM

## 2024-11-29 DIAGNOSIS — R23.8 DUSKY DISCOLORATION OF SKIN: ICD-10-CM

## 2024-11-29 DIAGNOSIS — I73.9 PVD (PERIPHERAL VASCULAR DISEASE) (HCC): ICD-10-CM

## 2024-11-29 DIAGNOSIS — L03.115 CELLULITIS OF RIGHT LOWER EXTREMITY: Primary | ICD-10-CM

## 2024-11-29 PROBLEM — L97.519 RIGHT FOOT ULCER, WITH UNSPECIFIED SEVERITY (HCC): Status: ACTIVE | Noted: 2024-11-29

## 2024-11-29 LAB
ALBUMIN SERPL-MCNC: 3.4 G/DL (ref 3.5–5)
ALBUMIN/GLOB SERPL: 1 (ref 1.1–2.2)
ALP SERPL-CCNC: 131 U/L (ref 45–117)
ALT SERPL-CCNC: 64 U/L (ref 12–78)
ANION GAP SERPL CALC-SCNC: 9 MMOL/L (ref 2–12)
AST SERPL-CCNC: 32 U/L (ref 15–37)
BASOPHILS # BLD: 0.1 K/UL (ref 0–0.1)
BASOPHILS NFR BLD: 1 % (ref 0–1)
BILIRUB SERPL-MCNC: 2.5 MG/DL (ref 0.2–1)
BUN SERPL-MCNC: 21 MG/DL (ref 6–20)
BUN/CREAT SERPL: 22 (ref 12–20)
CALCIUM SERPL-MCNC: 8.8 MG/DL (ref 8.5–10.1)
CHLORIDE SERPL-SCNC: 98 MMOL/L (ref 97–108)
CHOLEST SERPL-MCNC: 157 MG/DL
CO2 SERPL-SCNC: 27 MMOL/L (ref 21–32)
COMMENT:: NORMAL
CREAT SERPL-MCNC: 0.94 MG/DL (ref 0.7–1.3)
CRP SERPL-MCNC: 21.3 MG/DL (ref 0–0.3)
DIFFERENTIAL METHOD BLD: ABNORMAL
EOSINOPHIL # BLD: 0 K/UL (ref 0–0.4)
EOSINOPHIL NFR BLD: 0 % (ref 0–7)
ERYTHROCYTE [DISTWIDTH] IN BLOOD BY AUTOMATED COUNT: 12.4 % (ref 11.5–14.5)
ERYTHROCYTE [SEDIMENTATION RATE] IN BLOOD: 12 MM/HR (ref 0–20)
GLOBULIN SER CALC-MCNC: 3.4 G/DL (ref 2–4)
GLUCOSE BLD STRIP.AUTO-MCNC: 299 MG/DL (ref 65–117)
GLUCOSE BLD STRIP.AUTO-MCNC: 330 MG/DL (ref 65–117)
GLUCOSE BLD STRIP.AUTO-MCNC: 357 MG/DL (ref 65–117)
GLUCOSE BLD STRIP.AUTO-MCNC: 368 MG/DL (ref 65–117)
GLUCOSE SERPL-MCNC: 404 MG/DL (ref 65–100)
HCT VFR BLD AUTO: 45.6 % (ref 36.6–50.3)
HDLC SERPL-MCNC: 32 MG/DL
HDLC SERPL: 4.9 (ref 0–5)
HGB BLD-MCNC: 16.7 G/DL (ref 12.1–17)
IMM GRANULOCYTES # BLD AUTO: 0.2 K/UL (ref 0–0.04)
IMM GRANULOCYTES NFR BLD AUTO: 2 % (ref 0–0.5)
LACTATE SERPL-SCNC: 1.4 MMOL/L (ref 0.4–2)
LACTATE SERPL-SCNC: 1.5 MMOL/L (ref 0.4–2)
LDLC SERPL CALC-MCNC: 93.8 MG/DL (ref 0–100)
LYMPHOCYTES # BLD: 0.5 K/UL (ref 0.8–3.5)
LYMPHOCYTES NFR BLD: 4 % (ref 12–49)
MCH RBC QN AUTO: 31.8 PG (ref 26–34)
MCHC RBC AUTO-ENTMCNC: 36.6 G/DL (ref 30–36.5)
MCV RBC AUTO: 86.9 FL (ref 80–99)
MONOCYTES # BLD: 1 K/UL (ref 0–1)
MONOCYTES NFR BLD: 8 % (ref 5–13)
NEUTS SEG # BLD: 10.6 K/UL (ref 1.8–8)
NEUTS SEG NFR BLD: 85 % (ref 32–75)
NRBC # BLD: 0 K/UL (ref 0–0.01)
NRBC BLD-RTO: 0 PER 100 WBC
PLATELET # BLD AUTO: 101 K/UL (ref 150–400)
PMV BLD AUTO: 9.7 FL (ref 8.9–12.9)
POTASSIUM SERPL-SCNC: 4.2 MMOL/L (ref 3.5–5.1)
PROCALCITONIN SERPL-MCNC: 0.63 NG/ML
PROT SERPL-MCNC: 6.8 G/DL (ref 6.4–8.2)
RBC # BLD AUTO: 5.25 M/UL (ref 4.1–5.7)
RBC MORPH BLD: ABNORMAL
SERVICE CMNT-IMP: ABNORMAL
SODIUM SERPL-SCNC: 134 MMOL/L (ref 136–145)
SPECIMEN HOLD: NORMAL
TRIGL SERPL-MCNC: 156 MG/DL
VLDLC SERPL CALC-MCNC: 31.2 MG/DL
WBC # BLD AUTO: 12.4 K/UL (ref 4.1–11.1)

## 2024-11-29 PROCEDURE — 85025 COMPLETE CBC W/AUTO DIFF WBC: CPT

## 2024-11-29 PROCEDURE — 80053 COMPREHEN METABOLIC PANEL: CPT

## 2024-11-29 PROCEDURE — 1100000003 HC PRIVATE W/ TELEMETRY

## 2024-11-29 PROCEDURE — 6360000002 HC RX W HCPCS: Performed by: EMERGENCY MEDICINE

## 2024-11-29 PROCEDURE — 83605 ASSAY OF LACTIC ACID: CPT

## 2024-11-29 PROCEDURE — 80061 LIPID PANEL: CPT

## 2024-11-29 PROCEDURE — 96374 THER/PROPH/DIAG INJ IV PUSH: CPT

## 2024-11-29 PROCEDURE — 93922 UPR/L XTREMITY ART 2 LEVELS: CPT

## 2024-11-29 PROCEDURE — 2580000003 HC RX 258: Performed by: STUDENT IN AN ORGANIZED HEALTH CARE EDUCATION/TRAINING PROGRAM

## 2024-11-29 PROCEDURE — 6370000000 HC RX 637 (ALT 250 FOR IP): Performed by: EMERGENCY MEDICINE

## 2024-11-29 PROCEDURE — 6370000000 HC RX 637 (ALT 250 FOR IP): Performed by: STUDENT IN AN ORGANIZED HEALTH CARE EDUCATION/TRAINING PROGRAM

## 2024-11-29 PROCEDURE — 2580000003 HC RX 258: Performed by: EMERGENCY MEDICINE

## 2024-11-29 PROCEDURE — 73630 X-RAY EXAM OF FOOT: CPT

## 2024-11-29 PROCEDURE — 86140 C-REACTIVE PROTEIN: CPT

## 2024-11-29 PROCEDURE — 85652 RBC SED RATE AUTOMATED: CPT

## 2024-11-29 PROCEDURE — 87040 BLOOD CULTURE FOR BACTERIA: CPT

## 2024-11-29 PROCEDURE — 82962 GLUCOSE BLOOD TEST: CPT

## 2024-11-29 PROCEDURE — 99285 EMERGENCY DEPT VISIT HI MDM: CPT

## 2024-11-29 PROCEDURE — 84145 PROCALCITONIN (PCT): CPT

## 2024-11-29 PROCEDURE — 36415 COLL VENOUS BLD VENIPUNCTURE: CPT

## 2024-11-29 PROCEDURE — 6360000002 HC RX W HCPCS: Performed by: STUDENT IN AN ORGANIZED HEALTH CARE EDUCATION/TRAINING PROGRAM

## 2024-11-29 PROCEDURE — 93971 EXTREMITY STUDY: CPT

## 2024-11-29 RX ORDER — DEXTROSE MONOHYDRATE 100 MG/ML
INJECTION, SOLUTION INTRAVENOUS CONTINUOUS PRN
Status: DISCONTINUED | OUTPATIENT
Start: 2024-11-29 | End: 2024-12-12 | Stop reason: HOSPADM

## 2024-11-29 RX ORDER — ACETAMINOPHEN 325 MG/1
650 TABLET ORAL EVERY 6 HOURS PRN
Status: DISCONTINUED | OUTPATIENT
Start: 2024-11-29 | End: 2024-12-12 | Stop reason: HOSPADM

## 2024-11-29 RX ORDER — SODIUM CHLORIDE 0.9 % (FLUSH) 0.9 %
5-40 SYRINGE (ML) INJECTION EVERY 12 HOURS SCHEDULED
Status: DISCONTINUED | OUTPATIENT
Start: 2024-11-29 | End: 2024-12-01 | Stop reason: SDUPTHER

## 2024-11-29 RX ORDER — ACETAMINOPHEN 325 MG/1
650 TABLET ORAL EVERY 4 HOURS PRN
Status: DISCONTINUED | OUTPATIENT
Start: 2024-11-29 | End: 2024-12-01 | Stop reason: SDUPTHER

## 2024-11-29 RX ORDER — INSULIN GLARGINE 100 [IU]/ML
10 INJECTION, SOLUTION SUBCUTANEOUS NIGHTLY
Status: DISCONTINUED | OUTPATIENT
Start: 2024-11-29 | End: 2024-11-30

## 2024-11-29 RX ORDER — GLUCAGON 1 MG/ML
1 KIT INJECTION PRN
Status: DISCONTINUED | OUTPATIENT
Start: 2024-11-29 | End: 2024-12-12 | Stop reason: HOSPADM

## 2024-11-29 RX ORDER — AMLODIPINE BESYLATE 5 MG/1
5 TABLET ORAL DAILY
Status: DISCONTINUED | OUTPATIENT
Start: 2024-11-29 | End: 2024-11-30

## 2024-11-29 RX ORDER — INSULIN LISPRO 100 [IU]/ML
0-8 INJECTION, SOLUTION INTRAVENOUS; SUBCUTANEOUS
Status: DISCONTINUED | OUTPATIENT
Start: 2024-11-29 | End: 2024-12-12 | Stop reason: HOSPADM

## 2024-11-29 RX ORDER — ENOXAPARIN SODIUM 100 MG/ML
30 INJECTION SUBCUTANEOUS 2 TIMES DAILY
Status: DISCONTINUED | OUTPATIENT
Start: 2024-11-29 | End: 2024-12-08

## 2024-11-29 RX ORDER — SODIUM CHLORIDE 9 MG/ML
INJECTION, SOLUTION INTRAVENOUS PRN
Status: DISCONTINUED | OUTPATIENT
Start: 2024-11-29 | End: 2024-12-01 | Stop reason: SDUPTHER

## 2024-11-29 RX ORDER — VANCOMYCIN 1.5 G/300ML
15 INJECTION, SOLUTION INTRAVENOUS EVERY 12 HOURS
Status: DISCONTINUED | OUTPATIENT
Start: 2024-11-29 | End: 2024-12-01

## 2024-11-29 RX ORDER — ONDANSETRON 2 MG/ML
4 INJECTION INTRAMUSCULAR; INTRAVENOUS EVERY 4 HOURS PRN
Status: DISCONTINUED | OUTPATIENT
Start: 2024-11-29 | End: 2024-12-12 | Stop reason: HOSPADM

## 2024-11-29 RX ORDER — SODIUM CHLORIDE 0.9 % (FLUSH) 0.9 %
5-40 SYRINGE (ML) INJECTION PRN
Status: DISCONTINUED | OUTPATIENT
Start: 2024-11-29 | End: 2024-12-12 | Stop reason: HOSPADM

## 2024-11-29 RX ORDER — ACETAMINOPHEN 650 MG/1
650 SUPPOSITORY RECTAL EVERY 6 HOURS PRN
Status: DISCONTINUED | OUTPATIENT
Start: 2024-11-29 | End: 2024-12-12 | Stop reason: HOSPADM

## 2024-11-29 RX ORDER — 0.9 % SODIUM CHLORIDE 0.9 %
1000 INTRAVENOUS SOLUTION INTRAVENOUS ONCE
Status: COMPLETED | OUTPATIENT
Start: 2024-11-29 | End: 2024-11-29

## 2024-11-29 RX ADMIN — ENOXAPARIN SODIUM 30 MG: 100 INJECTION SUBCUTANEOUS at 14:02

## 2024-11-29 RX ADMIN — VANCOMYCIN 1500 MG: 1.5 INJECTION, SOLUTION INTRAVENOUS at 19:32

## 2024-11-29 RX ADMIN — INSULIN LISPRO 8 UNITS: 100 INJECTION, SOLUTION INTRAVENOUS; SUBCUTANEOUS at 20:58

## 2024-11-29 RX ADMIN — Medication 2500 MG: at 11:20

## 2024-11-29 RX ADMIN — PIPERACILLIN AND TAZOBACTAM 3375 MG: 3; .375 INJECTION, POWDER, LYOPHILIZED, FOR SOLUTION INTRAVENOUS at 16:18

## 2024-11-29 RX ADMIN — INSULIN GLARGINE 10 UNITS: 100 INJECTION, SOLUTION SUBCUTANEOUS at 20:57

## 2024-11-29 RX ADMIN — ENOXAPARIN SODIUM 30 MG: 100 INJECTION SUBCUTANEOUS at 20:57

## 2024-11-29 RX ADMIN — INSULIN HUMAN 10 UNITS: 100 INJECTION, SOLUTION PARENTERAL at 11:36

## 2024-11-29 RX ADMIN — ACETAMINOPHEN 325MG 650 MG: 325 TABLET ORAL at 15:51

## 2024-11-29 RX ADMIN — PIPERACILLIN AND TAZOBACTAM 4500 MG: 4; .5 INJECTION, POWDER, LYOPHILIZED, FOR SOLUTION INTRAVENOUS; PARENTERAL at 10:48

## 2024-11-29 RX ADMIN — SODIUM CHLORIDE, PRESERVATIVE FREE 10 ML: 5 INJECTION INTRAVENOUS at 20:58

## 2024-11-29 RX ADMIN — SODIUM CHLORIDE 1000 ML: 9 INJECTION, SOLUTION INTRAVENOUS at 10:45

## 2024-11-29 RX ADMIN — AMLODIPINE BESYLATE 5 MG: 5 TABLET ORAL at 21:00

## 2024-11-29 ASSESSMENT — PAIN DESCRIPTION - DIRECTION
RADIATING_TOWARDS: FOOT
RADIATING_TOWARDS: FOOT

## 2024-11-29 ASSESSMENT — PAIN DESCRIPTION - ORIENTATION
ORIENTATION: RIGHT;LOWER
ORIENTATION: RIGHT;LOWER
ORIENTATION: RIGHT
ORIENTATION: LOWER;RIGHT
ORIENTATION: RIGHT

## 2024-11-29 ASSESSMENT — PAIN DESCRIPTION - LOCATION
LOCATION: FOOT

## 2024-11-29 ASSESSMENT — PAIN SCALES - GENERAL
PAINLEVEL_OUTOF10: 6
PAINLEVEL_OUTOF10: 3
PAINLEVEL_OUTOF10: 4

## 2024-11-29 ASSESSMENT — PAIN DESCRIPTION - FREQUENCY
FREQUENCY: CONTINUOUS
FREQUENCY: INTERMITTENT

## 2024-11-29 ASSESSMENT — LIFESTYLE VARIABLES
HOW MANY STANDARD DRINKS CONTAINING ALCOHOL DO YOU HAVE ON A TYPICAL DAY: 1 OR 2
HOW OFTEN DO YOU HAVE A DRINK CONTAINING ALCOHOL: 2-4 TIMES A MONTH

## 2024-11-29 ASSESSMENT — PAIN - FUNCTIONAL ASSESSMENT
PAIN_FUNCTIONAL_ASSESSMENT: 0-10
PAIN_FUNCTIONAL_ASSESSMENT: ACTIVITIES ARE NOT PREVENTED

## 2024-11-29 ASSESSMENT — PAIN DESCRIPTION - DESCRIPTORS
DESCRIPTORS: ACHING
DESCRIPTORS: ACHING
DESCRIPTORS: GNAWING
DESCRIPTORS: ACHING

## 2024-11-29 ASSESSMENT — PAIN DESCRIPTION - PAIN TYPE
TYPE: NEUROPATHIC PAIN
TYPE: ACUTE PAIN

## 2024-11-29 ASSESSMENT — PAIN DESCRIPTION - ONSET
ONSET: ON-GOING

## 2024-11-29 NOTE — H&P
Hospitalist Admission Note    NAME:   Milton Hughes   : 1970   MRN: [id number]     Date/Time: 2024 12:33 PM    Patient PCP: No primary care provider on file.    ______________________________________________________________________  Given the patient's current clinical presentation, I have a high level of concern for decompensation if discharged from the emergency department.  Complex decision making was performed, which includes reviewing the patient's available past medical records, laboratory results, and x-ray films.       My assessment of this patient's clinical condition and my plan of care is as follows.    Assessment / Plan:    Right foot infection  Osteomyelitis rule out  PVD  Diabetic foot ulcer  DM not on any medications  -Admit to medicine with telemetry  - Will order MRI of the right foot to rule out osteo-  -On oral antibiotics Vanco and Zosyn for now  - Will order arterial ultrasound to evaluate for PAD  -Doppler ultrasound to rule out DVT  - Will order TORIE for PVD evaluation  -Podiatry consult, vascular consult after TORIE and arterial Doppler    DM2  Not on any medications at home  Will order A1c,  Will order sliding scale insulin     HTN  --not on any meds at home, monitor will consider adding antihypertensive agent amldoipine    Medical Decision Making:   I personally reviewed labs: CBC BMP  I personally reviewed imaging: X-ray  I personally reviewed EKG:    Discussed case with: ED provider. After discussion I am in agreement that acuity of patient's medical condition necessitates hospital stay.      Code Status: Full  DVT Prophylaxis: Lovenox  Baseline:     Subjective:   CHIEF COMPLAINT: Right foot discoloration, fevers    HISTORY OF PRESENT ILLNESS:     Milton Hughes is a 54 y.o.  male with PMHx significant for diabetes, HTN, not on any medications who comes in with complaints of fever since Tuesday, right for discoloration.  Patient reports that he started having fevers and    Result Value Ref Range    Procalcitonin 0.63 ng/mL   Extra Tubes Hold    Collection Time: 11/29/24  9:01 AM   Result Value Ref Range    Specimen HOld blue     Comment:        Add-on orders for these samples will be processed based on acceptable specimen integrity and analyte stability, which may vary by analyte.   POCT Glucose    Collection Time: 11/29/24 12:19 PM   Result Value Ref Range    POC Glucose 299 (H) 65 - 117 mg/dL    Performed by: Lorrie STANFORD          XR FOOT RIGHT (MIN 3 VIEWS)    Result Date: 11/29/2024  EXAM: XR FOOT RIGHT (MIN 3 VIEWS) INDICATION: toe discoloration, foot pain. COMPARISON: None. FINDINGS: Three views of the right foot demonstrate no fracture or other acute osseous or articular abnormality. The soft tissues are within normal limits.     No acute abnormality. Electronically signed by Tru Arevalo     _______________________________________________________________________    TOTAL TIME:  76 Minutes    Critical Care Provided     Minutes non procedure based    Signed: Yazan Rivera MD    Procedures: see electronic medical records for all procedures/Xrays and details which were not copied into this note but were reviewed prior to creation of Plan.

## 2024-11-29 NOTE — PROGRESS NOTES
.End of Shift Note    Bedside shift change report given to ESTRELLA Meyer (oncoming nurse) by TIMMY NÚÑEZ LPN (offgoing nurse).  Report included the following information SBAR, Kardex, ED Summary, Procedure Summary, Intake/Output, MAR, Recent Results, and Cardiac Rhythm SR    Shift worked:  7-7     Shift summary and any significant changes:     Patient on antibiotic therapy. Vitals stable. Out of bed to ambulate for BRP. Encouraged patient to use urinal so nursing can monitor output. Glucose 365 this evening, patient trending elevated blood sugars. Right foot elevated. Patient reports tingling in the right foot. Takes tylenol only. Refusing any stronger narcotics. 2 plus pedal pulses, and foot is warm to touch. Right foot dressing intact, some slight drainage occurring from top of foot. Plan for MRI tonight of the right foot.      Concerns for physician to address:  Monitor labs, results of MRI. Podiatry consult done and Podiatrist will see patient   Tomorrow.    Zone phone for oncoming shift:   [phone]       Activity:     Number times ambulated in hallways past shift: 0  Number of times OOB to chair past shift: 0    Cardiac:   Cardiac Monitoring: Yes           Access:  Current line(s): PIV     Genitourinary:   Urinary Status: Voiding    Respiratory:   O2 Device: None (Room air)  Chronic home O2 use?: NO  Incentive spirometer at bedside: NO    GI:  Last BM (including prior to admit): 11/29/24  Current diet:  ADULT DIET; Regular; 3 carb choices (45 gm/meal)  Passing flatus: YES    Pain Management:   Patient states pain is manageable on current regimen: YES    Skin:     Interventions: Wound Offloading (Prevention Methods): Repositioning    Patient Safety:  Fall Risk:         Active Consults:   IP CONSULT TO HOSPITALIST  PHARMACY TO DOSE VANCOMYCIN  IP CONSULT TO PODIATRY    Length of Stay:  Expected LOS: 3  Actual LOS: 0    TIMMY NÚÑEZ LPN

## 2024-11-29 NOTE — ED NOTES
This RN attempted to call report to floor RN. Receiving RN unavailable at this time and will call this RN back when available.

## 2024-11-29 NOTE — PROGRESS NOTES
Please complete screening and sign electronically or fax to [phone].     Telemetry order must be changed to allow the patient to leave the unit without telemetry.     Telemetry box cannot come to MRI with the patient.     Please call MRI at [phone] when this has been done.     If this patient needs monitored while off the unit, please call MRI at [phone] to coordinate a time for an RN to accompany the patient to MRI.

## 2024-11-29 NOTE — PROGRESS NOTES
Pharmacy Antimicrobial Kinetic Dosing    Indication for Antimicrobials: SSTI     Current Regimen of Each Antimicrobial:  Vanco - pharmacy dosing; Start Date ; Day # 1  Zosyn 4.5 g IV once then 3.375 g IV Q8H; Start Date ; Day # 1    Previous Antimicrobial Therapy:  NA     Goal Level: Vancomycin -600    Date Dose & Interval Measured (mcg/mL) Predicted AUC 24-48 Predicted AUC 24,ss                          Significant Cultures:   : blood cx pending    Labs:  Recent Labs     Units 24  0901   CREATININE MG/DL 0.94   BUN MG/DL 21*   PROCAL ng/mL 0.63   WBC K/uL 12.4*     Temp (24hrs), Av.8 °F (37.1 °C), Min:98.8 °F (37.1 °C), Max:98.8 °F (37.1 °C)    Conditions for Dosing Consideration: None    Creatinine Clearance (mL/min): Estimated Creatinine Clearance: 113 mL/min (based on SCr of 0.94 mg/dL).     Impression/Plan:   Vancomycin 2500 mg IV once then 1500 mg IV Q12H  Predicted [id number] = 504, Predicted AUC24,ss = 555  Antimicrobial stop date 7 days     Pharmacy will follow daily and adjust medications as appropriate for renal function and/or serum levels.    Thank you,  [place] [facility]

## 2024-11-29 NOTE — ED PROVIDER NOTES
MRM 3 SURG TELE  EMERGENCY DEPARTMENT ENCOUNTER       Pt Name: Milton Hughes  MRN: [id number]  Birthdate 1970  Date of evaluation: 2024  Provider: Payton Valadez MD   PCP: No primary care provider on file.  Note Started: 9:24 AM EST 24     CHIEF COMPLAINT       Chief Complaint   Patient presents with    Fever     Pt presents ambulatory to triage w/ complaints of fevers (101) and increasingly worse R foot swelling, discoloration, and pain x4 days.         HISTORY OF PRESENT ILLNESS: 1 or more elements      History From: patient, History limited by: none     Milton Hughes is a 54 y.o. male who presents with a cc of foot pain and fever       Please See MDM for Additional Details of the HPI/PMH  Nursing Notes were all reviewed and agreed with or any disagreements were addressed in the HPI.     REVIEW OF SYSTEMS        Positives and Pertinent negatives as per HPI.    PAST HISTORY     Past Medical History:  Past Medical History:   Diagnosis Date    Asthma     DM (diabetes mellitus) ([facility]) 2016    Other ill-defined conditions(799.89) neck injury       Past Surgical History:  Past Surgical History:   Procedure Laterality Date    CHEST SURGERY         Family History:  Family History   Problem Relation Age of Onset    Diabetes Father     Asthma Mother     Cancer Mother        Social History:  Social History     Tobacco Use    Smoking status: Former     Current packs/day: 0.00     Types: Cigarettes     Quit date: 2015     Years since quittin.8    Smokeless tobacco: Never   Substance Use Topics    Alcohol use: No    Drug use: No       Allergies:  No Known Allergies    CURRENT MEDICATIONS      Current Discharge Medication List        CONTINUE these medications which have NOT CHANGED    Details   diclofenac (VOLTAREN) 75 MG EC tablet Take 1 tablet by mouth 2 times daily as needed for Pain  Qty: 60 tablet, Refills: 0      naproxen (NAPROSYN) 500 MG tablet Take 1 tablet by mouth 2 times daily  skin over the lateral aspect. Fifth digit appears dusky. Wound noted to the plantar surface to the foot over the lateral aspect. 2+ DP pulse. Please see photos   Neurological:      General: No focal deficit present.      Mental Status: He is alert and oriented to person, place, and time.   Psychiatric:         Mood and Affect: Mood normal.         Behavior: Behavior normal.                DIAGNOSTIC RESULTS   LABS:     Recent Results (from the past 24 hour(s))   CBC with Auto Differential    Collection Time: 11/29/24  9:01 AM   Result Value Ref Range    WBC 12.4 (H) 4.1 - 11.1 K/uL    RBC 5.25 4.10 - 5.70 M/uL    Hemoglobin 16.7 12.1 - 17.0 g/dL    Hematocrit 45.6 36.6 - 50.3 %    MCV 86.9 80.0 - 99.0 FL    MCH 31.8 26.0 - 34.0 PG    MCHC 36.6 (H) 30.0 - 36.5 g/dL    RDW 12.4 11.5 - 14.5 %    Platelets 101 (L) 150 - 400 K/uL    MPV 9.7 8.9 - 12.9 FL    Nucleated RBCs 0.0 0  WBC    nRBC 0.00 0.00 - 0.01 K/uL    Neutrophils % 85 (H) 32 - 75 %    Lymphocytes % 4 (L) 12 - 49 %    Monocytes % 8 5 - 13 %    Eosinophils % 0 0 - 7 %    Basophils % 1 0 - 1 %    Immature Granulocytes % 2 (H) 0.0 - 0.5 %    Neutrophils Absolute 10.6 (H) 1.8 - 8.0 K/UL    Lymphocytes Absolute 0.5 (L) 0.8 - 3.5 K/UL    Monocytes Absolute 1.0 0.0 - 1.0 K/UL    Eosinophils Absolute 0.0 0.0 - 0.4 K/UL    Basophils Absolute 0.1 0.0 - 0.1 K/UL    Immature Granulocytes Absolute 0.2 (H) 0.00 - 0.04 K/UL    Differential Type SMEAR SCANNED      RBC Comment NORMOCYTIC, NORMOCHROMIC     Comprehensive Metabolic Panel    Collection Time: 11/29/24  9:01 AM   Result Value Ref Range    Sodium 134 (L) 136 - 145 mmol/L    Potassium 4.2 3.5 - 5.1 mmol/L    Chloride 98 97 - 108 mmol/L    CO2 27 21 - 32 mmol/L    Anion Gap 9 2 - 12 mmol/L    Glucose 404 (H) 65 - 100 mg/dL    BUN 21 (H) 6 - 20 MG/DL    Creatinine 0.94 0.70 - 1.30 MG/DL    BUN/Creatinine Ratio 22 (H) 12 - 20      Est, Glom Filt Rate >90 >60 ml/min/1.73m2    Calcium 8.8 8.5 - 10.1 MG/DL    Total

## 2024-11-30 ENCOUNTER — APPOINTMENT (OUTPATIENT)
Facility: HOSPITAL | Age: 54
DRG: 872 | End: 2024-11-30
Payer: MEDICAID

## 2024-11-30 ENCOUNTER — ANESTHESIA EVENT (OUTPATIENT)
Facility: HOSPITAL | Age: 54
End: 2024-11-30

## 2024-11-30 LAB
ANION GAP SERPL CALC-SCNC: 5 MMOL/L (ref 2–12)
BUN SERPL-MCNC: 16 MG/DL (ref 6–20)
BUN/CREAT SERPL: 29 (ref 12–20)
CALCIUM SERPL-MCNC: 8.1 MG/DL (ref 8.5–10.1)
CHLORIDE SERPL-SCNC: 106 MMOL/L (ref 97–108)
CO2 SERPL-SCNC: 25 MMOL/L (ref 21–32)
CREAT SERPL-MCNC: 0.55 MG/DL (ref 0.7–1.3)
EST. AVERAGE GLUCOSE BLD GHB EST-MCNC: 249 MG/DL
GLUCOSE BLD STRIP.AUTO-MCNC: 231 MG/DL (ref 65–117)
GLUCOSE BLD STRIP.AUTO-MCNC: 250 MG/DL (ref 65–117)
GLUCOSE BLD STRIP.AUTO-MCNC: 255 MG/DL (ref 65–117)
GLUCOSE BLD STRIP.AUTO-MCNC: 259 MG/DL (ref 65–117)
GLUCOSE BLD STRIP.AUTO-MCNC: 295 MG/DL (ref 65–117)
GLUCOSE SERPL-MCNC: 248 MG/DL (ref 65–100)
HBA1C MFR BLD: 10.3 % (ref 4–5.6)
POTASSIUM SERPL-SCNC: 3.7 MMOL/L (ref 3.5–5.1)
SERVICE CMNT-IMP: ABNORMAL
SODIUM SERPL-SCNC: 136 MMOL/L (ref 136–145)

## 2024-11-30 PROCEDURE — 80048 BASIC METABOLIC PNL TOTAL CA: CPT

## 2024-11-30 PROCEDURE — 83036 HEMOGLOBIN GLYCOSYLATED A1C: CPT

## 2024-11-30 PROCEDURE — 2580000003 HC RX 258: Performed by: STUDENT IN AN ORGANIZED HEALTH CARE EDUCATION/TRAINING PROGRAM

## 2024-11-30 PROCEDURE — 36415 COLL VENOUS BLD VENIPUNCTURE: CPT

## 2024-11-30 PROCEDURE — 6360000002 HC RX W HCPCS: Performed by: STUDENT IN AN ORGANIZED HEALTH CARE EDUCATION/TRAINING PROGRAM

## 2024-11-30 PROCEDURE — 6370000000 HC RX 637 (ALT 250 FOR IP): Performed by: INTERNAL MEDICINE

## 2024-11-30 PROCEDURE — 73718 MRI LOWER EXTREMITY W/O DYE: CPT

## 2024-11-30 PROCEDURE — 6370000000 HC RX 637 (ALT 250 FOR IP): Performed by: STUDENT IN AN ORGANIZED HEALTH CARE EDUCATION/TRAINING PROGRAM

## 2024-11-30 PROCEDURE — 82962 GLUCOSE BLOOD TEST: CPT

## 2024-11-30 PROCEDURE — 1100000003 HC PRIVATE W/ TELEMETRY

## 2024-11-30 RX ORDER — INSULIN GLARGINE 100 [IU]/ML
25 INJECTION, SOLUTION SUBCUTANEOUS NIGHTLY
Status: DISCONTINUED | OUTPATIENT
Start: 2024-12-01 | End: 2024-12-01

## 2024-11-30 RX ORDER — FLUTICASONE PROPIONATE 50 MCG
1 SPRAY, SUSPENSION (ML) NASAL DAILY PRN
Status: DISCONTINUED | OUTPATIENT
Start: 2024-11-30 | End: 2024-12-12 | Stop reason: HOSPADM

## 2024-11-30 RX ORDER — ASPIRIN 81 MG/1
81 TABLET, CHEWABLE ORAL DAILY
Status: DISCONTINUED | OUTPATIENT
Start: 2024-12-01 | End: 2024-12-12 | Stop reason: HOSPADM

## 2024-11-30 RX ORDER — ATORVASTATIN CALCIUM 40 MG/1
40 TABLET, FILM COATED ORAL NIGHTLY
Status: DISCONTINUED | OUTPATIENT
Start: 2024-11-30 | End: 2024-12-12 | Stop reason: HOSPADM

## 2024-11-30 RX ORDER — LISINOPRIL 20 MG/1
40 TABLET ORAL DAILY
Status: DISCONTINUED | OUTPATIENT
Start: 2024-12-01 | End: 2024-12-12 | Stop reason: HOSPADM

## 2024-11-30 RX ORDER — INSULIN LISPRO 100 [IU]/ML
7 INJECTION, SOLUTION INTRAVENOUS; SUBCUTANEOUS
Status: DISCONTINUED | OUTPATIENT
Start: 2024-12-01 | End: 2024-12-01

## 2024-11-30 RX ADMIN — INSULIN GLARGINE 10 UNITS: 100 INJECTION, SOLUTION SUBCUTANEOUS at 20:41

## 2024-11-30 RX ADMIN — FLUTICASONE PROPIONATE 1 SPRAY: 50 SPRAY, METERED NASAL at 17:28

## 2024-11-30 RX ADMIN — SODIUM CHLORIDE, PRESERVATIVE FREE 10 ML: 5 INJECTION INTRAVENOUS at 09:12

## 2024-11-30 RX ADMIN — ACETAMINOPHEN 325MG 650 MG: 325 TABLET ORAL at 17:37

## 2024-11-30 RX ADMIN — ATORVASTATIN CALCIUM 40 MG: 40 TABLET, FILM COATED ORAL at 20:42

## 2024-11-30 RX ADMIN — ENOXAPARIN SODIUM 30 MG: 100 INJECTION SUBCUTANEOUS at 20:40

## 2024-11-30 RX ADMIN — VANCOMYCIN 1500 MG: 1.5 INJECTION, SOLUTION INTRAVENOUS at 10:07

## 2024-11-30 RX ADMIN — ENOXAPARIN SODIUM 30 MG: 100 INJECTION SUBCUTANEOUS at 09:58

## 2024-11-30 RX ADMIN — PIPERACILLIN AND TAZOBACTAM 3375 MG: 3; .375 INJECTION, POWDER, LYOPHILIZED, FOR SOLUTION INTRAVENOUS at 01:20

## 2024-11-30 RX ADMIN — INSULIN LISPRO 4 UNITS: 100 INJECTION, SOLUTION INTRAVENOUS; SUBCUTANEOUS at 13:03

## 2024-11-30 RX ADMIN — PIPERACILLIN AND TAZOBACTAM 3375 MG: 3; .375 INJECTION, POWDER, LYOPHILIZED, FOR SOLUTION INTRAVENOUS at 12:16

## 2024-11-30 RX ADMIN — PIPERACILLIN AND TAZOBACTAM 3375 MG: 3; .375 INJECTION, POWDER, LYOPHILIZED, FOR SOLUTION INTRAVENOUS at 17:13

## 2024-11-30 RX ADMIN — INSULIN LISPRO 4 UNITS: 100 INJECTION, SOLUTION INTRAVENOUS; SUBCUTANEOUS at 18:39

## 2024-11-30 RX ADMIN — INSULIN LISPRO 2 UNITS: 100 INJECTION, SOLUTION INTRAVENOUS; SUBCUTANEOUS at 09:59

## 2024-11-30 RX ADMIN — INSULIN LISPRO 4 UNITS: 100 INJECTION, SOLUTION INTRAVENOUS; SUBCUTANEOUS at 20:41

## 2024-11-30 RX ADMIN — ACETAMINOPHEN 325MG 650 MG: 325 TABLET ORAL at 01:50

## 2024-11-30 RX ADMIN — VANCOMYCIN 1500 MG: 1.5 INJECTION, SOLUTION INTRAVENOUS at 20:40

## 2024-11-30 RX ADMIN — SODIUM CHLORIDE, PRESERVATIVE FREE 10 ML: 5 INJECTION INTRAVENOUS at 20:43

## 2024-11-30 ASSESSMENT — PAIN DESCRIPTION - LOCATION
LOCATION: FOOT

## 2024-11-30 ASSESSMENT — PAIN DESCRIPTION - PAIN TYPE
TYPE: NEUROPATHIC PAIN
TYPE: NEUROPATHIC PAIN

## 2024-11-30 ASSESSMENT — PAIN DESCRIPTION - FREQUENCY
FREQUENCY: INTERMITTENT
FREQUENCY: INTERMITTENT

## 2024-11-30 ASSESSMENT — PAIN DESCRIPTION - ONSET
ONSET: ON-GOING
ONSET: ON-GOING

## 2024-11-30 ASSESSMENT — PAIN DESCRIPTION - DESCRIPTORS
DESCRIPTORS: ACHING;DISCOMFORT
DESCRIPTORS: ACHING
DESCRIPTORS: ACHING

## 2024-11-30 ASSESSMENT — PAIN SCALES - GENERAL
PAINLEVEL_OUTOF10: 3
PAINLEVEL_OUTOF10: 2
PAINLEVEL_OUTOF10: 0

## 2024-11-30 ASSESSMENT — PAIN DESCRIPTION - ORIENTATION
ORIENTATION: RIGHT

## 2024-11-30 ASSESSMENT — PAIN - FUNCTIONAL ASSESSMENT
PAIN_FUNCTIONAL_ASSESSMENT: ACTIVITIES ARE NOT PREVENTED
PAIN_FUNCTIONAL_ASSESSMENT: ACTIVITIES ARE NOT PREVENTED

## 2024-11-30 NOTE — PROGRESS NOTES
End of Shift Note    Bedside shift change report given to Shamika (oncoming nurse) by Betsy Brennan RN (offgoing nurse).  Report included the following information SBAR, Kardex, Intake/Output, MAR, and Recent Results    Shift worked:  2597-9520     Shift summary and any significant changes:     No acute changes overnight. Pt dressing to R foot clean/dry/intact. MRI to be completed, awaiting podiatry consult. No other concerns.      Concerns for physician to address:  none     Zone phone for oncoming shift:          Activity:  Level of Assistance: Standby assist, set-up cues, supervision of patient - no hands on  Number times ambulated in hallways past shift: 0  Number of times OOB to chair past shift: 0    Cardiac:   Cardiac Monitoring: Yes           Access:  Current line(s): PIV     Genitourinary:   Urinary Status: Voiding    Respiratory:   O2 Device: None (Room air)  Chronic home O2 use?: NO  Incentive spirometer at bedside: YES    GI:  Last BM (including prior to admit): 11/29/24  Current diet:  ADULT DIET; Regular; 3 carb choices (45 gm/meal)  Passing flatus: YES    Pain Management:   Patient states pain is manageable on current regimen: YES    Skin:  Isaiah Scale Score: 19  Interventions: Wound Offloading (Prevention Methods): Bed, pressure reduction mattress, Elevate heels, Pillows, Repositioning, Turning    Patient Safety:  Fall Risk: Nursing Judgement-Fall Risk High(Add Comments): No  Fall Risk Interventions  Nursing Judgement-Fall Risk High(Add Comments): No  Toilet Every 2 Hours-In Advance of Need: Yes  Hourly Visual Checks: Awake, In bed, Quiet  Fall Visual Posted: Fall sign posted, Socks  Room Door Open: Yes  Alarm On: Bed    Active Consults:   IP CONSULT TO HOSPITALIST  PHARMACY TO DOSE VANCOMYCIN  IP CONSULT TO PODIATRY    Length of Stay:  Expected LOS: 3  Actual LOS: 1    Betsy Brennan RN

## 2024-11-30 NOTE — PLAN OF CARE
Problem: Chronic Conditions and Co-morbidities  Goal: Patient's chronic conditions and co-morbidity symptoms are monitored and maintained or improved  Outcome: Progressing     Problem: Discharge Planning  Goal: Discharge to home or other facility with appropriate resources  Outcome: Progressing     Problem: Pain  Goal: Verbalizes/displays adequate comfort level or baseline comfort level  Outcome: Progressing     Problem: Skin/Tissue Integrity - Adult  Goal: Incisions, wounds, or drain sites healing without S/S of infection  11/30/2024 0301 by Betsy Brennan RN  Outcome: Progressing  11/29/2024 1411 by Idania Ortiz RN  Reactivated     Problem: Musculoskeletal - Adult  Goal: Return mobility to safest level of function  11/30/2024 0301 by Betsy Brennan RN  Outcome: Progressing  Flowsheets (Taken 11/29/2024 1546 by Joanna Briseno LPN)  Return Mobility to Safest Level of Function:   Assess patient stability and activity tolerance for standing, transferring and ambulating with or without assistive devices   Assist with transfers and ambulation using safe patient handling equipment as needed   Ensure adequate protection for wounds/incisions during mobilization  11/29/2024 1411 by Idania Ortiz RN  Reactivated     Problem: Safety - Adult  Goal: Free from fall injury  Outcome: Progressing

## 2024-11-30 NOTE — PLAN OF CARE
Problem: Chronic Conditions and Co-morbidities  Goal: Patient's chronic conditions and co-morbidity symptoms are monitored and maintained or improved  11/30/2024 1220 by Shamika Currie RN  Outcome: Progressing  11/30/2024 0301 by Betsy Brennan RN  Outcome: Progressing     Problem: Chronic Conditions and Co-morbidities  Goal: Patient's chronic conditions and co-morbidity symptoms are monitored and maintained or improved  11/30/2024 1220 by Shamika Currie RN  Outcome: Progressing  11/30/2024 0301 by Betsy Brennan RN  Outcome: Progressing     Problem: Discharge Planning  Goal: Discharge to home or other facility with appropriate resources  11/30/2024 1220 by Shamika Currie RN  Outcome: Progressing  11/30/2024 0301 by Betsy Brennan RN  Outcome: Progressing     Problem: Discharge Planning  Goal: Discharge to home or other facility with appropriate resources  11/30/2024 1220 by Shamika Currie RN  Outcome: Progressing  11/30/2024 0301 by Betsy Brennan RN  Outcome: Progressing     Problem: Pain  Goal: Verbalizes/displays adequate comfort level or baseline comfort level  11/30/2024 1220 by Shamika Currie RN  Outcome: Progressing  11/30/2024 0301 by Betsy Brennan RN  Outcome: Progressing     Problem: Pain  Goal: Verbalizes/displays adequate comfort level or baseline comfort level  11/30/2024 1220 by Shamika Currie RN  Outcome: Progressing  11/30/2024 0301 by Betsy Brennan RN  Outcome: Progressing     Problem: Skin/Tissue Integrity - Adult  Goal: Incisions, wounds, or drain sites healing without S/S of infection  11/30/2024 1220 by Shamika Currie RN  Outcome: Progressing  11/30/2024 0301 by Betsy Brennan RN  Outcome: Progressing     Problem: Skin/Tissue Integrity - Adult  Goal: Incisions, wounds, or drain sites healing without S/S of infection  11/30/2024 1220 by Shamika Currie RN  Outcome: Progressing  11/30/2024 0301 by Betsy Brennan RN  Outcome: Progressing     Problem: Musculoskeletal -

## 2024-11-30 NOTE — CONSULTS
Seen patient in the room for right foot infection, may have stepped on something and did not realize since he is diabetic not well controlled last A1C was possible 10, not been \"taking care of th diabetes\"   Has no feeling in the toe, started turning dark after the incident   Cold toe with erythema covering the entire forefoot and tracking in to the anterior compartment to the leg proximal to mid portion of the leg  Small puncture wound in the plantar aspect of the foot right with cyanotic toe   Palpable pedal pulses bilateral and evidence of normal perfusion the rest of the toes and foot  Will need toe amputation and proximal I&D advised patient for the same and he is verbally consented for the same  Will schedule the surgery for tomorrow AM  Keep patient NPO and hold anticoagulants please

## 2024-11-30 NOTE — PROGRESS NOTES
Pharmacy Antimicrobial Kinetic Dosing    Indication for Antimicrobials: SSTI     Current Regimen of Each Antimicrobial:  Vanco - pharmacy dosing; Start Date ; Day # 2  Zosyn 4.5 g IV once then 3.375 g IV Q8H; Start Date ; Day # 2    Previous Antimicrobial Therapy:  NA     Goal Level: Vancomycin -600    Date Dose & Interval Measured (mcg/mL) Predicted AUC 24-48 Predicted AUC 24,ss                          Significant Cultures:   : blood cx pending    Labs:  Recent Labs     Units 24  0620 24  0901   CREATININE MG/DL 0.55* 0.94   BUN MG/DL 16 21*   PROCAL ng/mL  --  0.63   WBC K/uL  --  12.4*     Temp (24hrs), Av.9 °F (37.2 °C), Min:97.9 °F (36.6 °C), Max:99.9 °F (37.7 °C)    Conditions for Dosing Consideration: None    Creatinine Clearance (mL/min): Estimated Creatinine Clearance: 193 mL/min (A) (based on SCr of 0.55 mg/dL (L)).     Impression/Plan:   Vancomycin 2500 mg IV once then 1500 mg IV Q12H  Predicted CNC66-96 = 504, Predicted AUC24,ss = 555  Antimicrobial stop date 7 days     Pharmacy will follow daily and adjust medications as appropriate for renal function and/or serum levels.    Thank you,  Natan Coughlin RPH

## 2024-12-01 ENCOUNTER — ANESTHESIA (OUTPATIENT)
Facility: HOSPITAL | Age: 54
End: 2024-12-01

## 2024-12-01 LAB
ANION GAP SERPL CALC-SCNC: 6 MMOL/L (ref 2–12)
BASOPHILS # BLD: 0.1 K/UL (ref 0–0.1)
BASOPHILS NFR BLD: 1 % (ref 0–1)
BUN SERPL-MCNC: 14 MG/DL (ref 6–20)
BUN/CREAT SERPL: 21 (ref 12–20)
CALCIUM SERPL-MCNC: 8.5 MG/DL (ref 8.5–10.1)
CHLORIDE SERPL-SCNC: 102 MMOL/L (ref 97–108)
CO2 SERPL-SCNC: 24 MMOL/L (ref 21–32)
CREAT SERPL-MCNC: 0.68 MG/DL (ref 0.7–1.3)
DIFFERENTIAL METHOD BLD: ABNORMAL
EOSINOPHIL # BLD: 0.2 K/UL (ref 0–0.4)
EOSINOPHIL NFR BLD: 2 % (ref 0–7)
ERYTHROCYTE [DISTWIDTH] IN BLOOD BY AUTOMATED COUNT: 11.9 % (ref 11.5–14.5)
GLUCOSE BLD STRIP.AUTO-MCNC: 226 MG/DL (ref 65–117)
GLUCOSE BLD STRIP.AUTO-MCNC: 237 MG/DL (ref 65–117)
GLUCOSE BLD STRIP.AUTO-MCNC: 259 MG/DL (ref 65–117)
GLUCOSE BLD STRIP.AUTO-MCNC: 331 MG/DL (ref 65–117)
GLUCOSE BLD STRIP.AUTO-MCNC: 353 MG/DL (ref 65–117)
GLUCOSE BLD STRIP.AUTO-MCNC: 396 MG/DL (ref 65–117)
GLUCOSE SERPL-MCNC: 249 MG/DL (ref 65–100)
HCT VFR BLD AUTO: 44.9 % (ref 36.6–50.3)
HGB BLD-MCNC: 16.2 G/DL (ref 12.1–17)
IMM GRANULOCYTES # BLD AUTO: 0.1 K/UL (ref 0–0.04)
IMM GRANULOCYTES NFR BLD AUTO: 1 % (ref 0–0.5)
LYMPHOCYTES # BLD: 1.2 K/UL (ref 0.8–3.5)
LYMPHOCYTES NFR BLD: 9 % (ref 12–49)
MCH RBC QN AUTO: 31.2 PG (ref 26–34)
MCHC RBC AUTO-ENTMCNC: 36.1 G/DL (ref 30–36.5)
MCV RBC AUTO: 86.3 FL (ref 80–99)
MONOCYTES # BLD: 0.9 K/UL (ref 0–1)
MONOCYTES NFR BLD: 7 % (ref 5–13)
NEUTS SEG # BLD: 10.8 K/UL (ref 1.8–8)
NEUTS SEG NFR BLD: 81 % (ref 32–75)
NRBC # BLD: 0 K/UL (ref 0–0.01)
NRBC BLD-RTO: 0 PER 100 WBC
PLATELET # BLD AUTO: 141 K/UL (ref 150–400)
PMV BLD AUTO: 9.9 FL (ref 8.9–12.9)
POTASSIUM SERPL-SCNC: 3.9 MMOL/L (ref 3.5–5.1)
RBC # BLD AUTO: 5.2 M/UL (ref 4.1–5.7)
SERVICE CMNT-IMP: ABNORMAL
SODIUM SERPL-SCNC: 132 MMOL/L (ref 136–145)
VANCOMYCIN SERPL-MCNC: 4.6 UG/ML
WBC # BLD AUTO: 13.3 K/UL (ref 4.1–11.1)

## 2024-12-01 PROCEDURE — 87186 SC STD MICRODIL/AGAR DIL: CPT

## 2024-12-01 PROCEDURE — 2580000003 HC RX 258: Performed by: STUDENT IN AN ORGANIZED HEALTH CARE EDUCATION/TRAINING PROGRAM

## 2024-12-01 PROCEDURE — 6360000002 HC RX W HCPCS: Performed by: NURSE ANESTHETIST, CERTIFIED REGISTERED

## 2024-12-01 PROCEDURE — 6370000000 HC RX 637 (ALT 250 FOR IP): Performed by: PODIATRIST

## 2024-12-01 PROCEDURE — 3700000000 HC ANESTHESIA ATTENDED CARE: Performed by: PODIATRIST

## 2024-12-01 PROCEDURE — 87077 CULTURE AEROBIC IDENTIFY: CPT

## 2024-12-01 PROCEDURE — 6360000002 HC RX W HCPCS: Performed by: PODIATRIST

## 2024-12-01 PROCEDURE — 0Y6X0Z1 DETACHMENT AT RIGHT 5TH TOE, HIGH, OPEN APPROACH: ICD-10-PCS | Performed by: PODIATRIST

## 2024-12-01 PROCEDURE — 7100000001 HC PACU RECOVERY - ADDTL 15 MIN: Performed by: PODIATRIST

## 2024-12-01 PROCEDURE — 2580000003 HC RX 258: Performed by: ANESTHESIOLOGY

## 2024-12-01 PROCEDURE — 6360000002 HC RX W HCPCS: Performed by: STUDENT IN AN ORGANIZED HEALTH CARE EDUCATION/TRAINING PROGRAM

## 2024-12-01 PROCEDURE — 2580000003 HC RX 258: Performed by: PODIATRIST

## 2024-12-01 PROCEDURE — 2580000003 HC RX 258: Performed by: NURSE ANESTHETIST, CERTIFIED REGISTERED

## 2024-12-01 PROCEDURE — 88311 DECALCIFY TISSUE: CPT

## 2024-12-01 PROCEDURE — 87147 CULTURE TYPE IMMUNOLOGIC: CPT

## 2024-12-01 PROCEDURE — 88305 TISSUE EXAM BY PATHOLOGIST: CPT

## 2024-12-01 PROCEDURE — 87205 SMEAR GRAM STAIN: CPT

## 2024-12-01 PROCEDURE — 7100000000 HC PACU RECOVERY - FIRST 15 MIN: Performed by: PODIATRIST

## 2024-12-01 PROCEDURE — 1100000003 HC PRIVATE W/ TELEMETRY

## 2024-12-01 PROCEDURE — 87075 CULTR BACTERIA EXCEPT BLOOD: CPT

## 2024-12-01 PROCEDURE — 80048 BASIC METABOLIC PNL TOTAL CA: CPT

## 2024-12-01 PROCEDURE — 82962 GLUCOSE BLOOD TEST: CPT

## 2024-12-01 PROCEDURE — 3600000012 HC SURGERY LEVEL 2 ADDTL 15MIN: Performed by: PODIATRIST

## 2024-12-01 PROCEDURE — 2709999900 HC NON-CHARGEABLE SUPPLY: Performed by: PODIATRIST

## 2024-12-01 PROCEDURE — 80202 ASSAY OF VANCOMYCIN: CPT

## 2024-12-01 PROCEDURE — 87070 CULTURE OTHR SPECIMN AEROBIC: CPT

## 2024-12-01 PROCEDURE — 3700000001 HC ADD 15 MINUTES (ANESTHESIA): Performed by: PODIATRIST

## 2024-12-01 PROCEDURE — 36415 COLL VENOUS BLD VENIPUNCTURE: CPT

## 2024-12-01 PROCEDURE — 3600000002 HC SURGERY LEVEL 2 BASE: Performed by: PODIATRIST

## 2024-12-01 PROCEDURE — 85025 COMPLETE CBC W/AUTO DIFF WBC: CPT

## 2024-12-01 RX ORDER — HYDROMORPHONE HYDROCHLORIDE 1 MG/ML
0.25 INJECTION, SOLUTION INTRAMUSCULAR; INTRAVENOUS; SUBCUTANEOUS EVERY 5 MIN PRN
Status: DISCONTINUED | OUTPATIENT
Start: 2024-12-01 | End: 2024-12-01 | Stop reason: SDUPTHER

## 2024-12-01 RX ORDER — NALOXONE HYDROCHLORIDE 0.4 MG/ML
INJECTION, SOLUTION INTRAMUSCULAR; INTRAVENOUS; SUBCUTANEOUS PRN
Status: DISCONTINUED | OUTPATIENT
Start: 2024-12-01 | End: 2024-12-01 | Stop reason: SDUPTHER

## 2024-12-01 RX ORDER — BUPIVACAINE HYDROCHLORIDE 5 MG/ML
INJECTION, SOLUTION PERINEURAL PRN
Status: DISCONTINUED | OUTPATIENT
Start: 2024-12-01 | End: 2024-12-01 | Stop reason: ALTCHOICE

## 2024-12-01 RX ORDER — DEXAMETHASONE SODIUM PHOSPHATE 4 MG/ML
INJECTION, SOLUTION INTRA-ARTICULAR; INTRALESIONAL; INTRAMUSCULAR; INTRAVENOUS; SOFT TISSUE
Status: DISCONTINUED | OUTPATIENT
Start: 2024-12-01 | End: 2024-12-01 | Stop reason: SDUPTHER

## 2024-12-01 RX ORDER — HYDROMORPHONE HYDROCHLORIDE 1 MG/ML
0.5 INJECTION, SOLUTION INTRAMUSCULAR; INTRAVENOUS; SUBCUTANEOUS EVERY 4 HOURS PRN
Status: DISCONTINUED | OUTPATIENT
Start: 2024-12-01 | End: 2024-12-09

## 2024-12-01 RX ORDER — SODIUM CHLORIDE 9 MG/ML
INJECTION, SOLUTION INTRAVENOUS PRN
Status: DISCONTINUED | OUTPATIENT
Start: 2024-12-01 | End: 2024-12-01 | Stop reason: SDUPTHER

## 2024-12-01 RX ORDER — LIDOCAINE HYDROCHLORIDE 20 MG/ML
INJECTION, SOLUTION EPIDURAL; INFILTRATION; INTRACAUDAL; PERINEURAL
Status: DISCONTINUED | OUTPATIENT
Start: 2024-12-01 | End: 2024-12-01 | Stop reason: SDUPTHER

## 2024-12-01 RX ORDER — SODIUM CHLORIDE 9 MG/ML
INJECTION, SOLUTION INTRAVENOUS PRN
Status: DISCONTINUED | OUTPATIENT
Start: 2024-12-01 | End: 2024-12-04 | Stop reason: HOSPADM

## 2024-12-01 RX ORDER — SODIUM CHLORIDE 0.9 % (FLUSH) 0.9 %
5-40 SYRINGE (ML) INJECTION PRN
Status: DISCONTINUED | OUTPATIENT
Start: 2024-12-01 | End: 2024-12-01 | Stop reason: SDUPTHER

## 2024-12-01 RX ORDER — VANCOMYCIN 1.75 GRAM/500 ML IN 0.9 % SODIUM CHLORIDE INTRAVENOUS
1750 EVERY 8 HOURS
Status: DISCONTINUED | OUTPATIENT
Start: 2024-12-01 | End: 2024-12-01 | Stop reason: SDUPTHER

## 2024-12-01 RX ORDER — FAMOTIDINE 20 MG/1
20 TABLET, FILM COATED ORAL 2 TIMES DAILY
Status: DISCONTINUED | OUTPATIENT
Start: 2024-12-01 | End: 2024-12-12 | Stop reason: HOSPADM

## 2024-12-01 RX ORDER — PROCHLORPERAZINE EDISYLATE 5 MG/ML
5 INJECTION INTRAMUSCULAR; INTRAVENOUS
Status: DISCONTINUED | OUTPATIENT
Start: 2024-12-01 | End: 2024-12-01 | Stop reason: SDUPTHER

## 2024-12-01 RX ORDER — FENTANYL CITRATE 50 UG/ML
50 INJECTION, SOLUTION INTRAMUSCULAR; INTRAVENOUS EVERY 5 MIN PRN
Status: DISCONTINUED | OUTPATIENT
Start: 2024-12-01 | End: 2024-12-01 | Stop reason: SDUPTHER

## 2024-12-01 RX ORDER — MIDAZOLAM HYDROCHLORIDE 1 MG/ML
INJECTION, SOLUTION INTRAMUSCULAR; INTRAVENOUS
Status: DISCONTINUED | OUTPATIENT
Start: 2024-12-01 | End: 2024-12-01 | Stop reason: SDUPTHER

## 2024-12-01 RX ORDER — OXYCODONE HYDROCHLORIDE 5 MG/1
5 TABLET ORAL EVERY 4 HOURS PRN
Status: DISCONTINUED | OUTPATIENT
Start: 2024-12-01 | End: 2024-12-12 | Stop reason: HOSPADM

## 2024-12-01 RX ORDER — SODIUM CHLORIDE, SODIUM LACTATE, POTASSIUM CHLORIDE, CALCIUM CHLORIDE 600; 310; 30; 20 MG/100ML; MG/100ML; MG/100ML; MG/100ML
INJECTION, SOLUTION INTRAVENOUS
Status: DISCONTINUED | OUTPATIENT
Start: 2024-12-01 | End: 2024-12-01 | Stop reason: SDUPTHER

## 2024-12-01 RX ORDER — VANCOMYCIN 1.25 G/250ML
1750 INJECTION, SOLUTION INTRAVENOUS EVERY 8 HOURS
Status: DISCONTINUED | OUTPATIENT
Start: 2024-12-01 | End: 2024-12-04

## 2024-12-01 RX ORDER — INSULIN GLARGINE 100 [IU]/ML
30 INJECTION, SOLUTION SUBCUTANEOUS NIGHTLY
Status: DISCONTINUED | OUTPATIENT
Start: 2024-12-01 | End: 2024-12-02

## 2024-12-01 RX ORDER — PROPOFOL 10 MG/ML
INJECTION, EMULSION INTRAVENOUS
Status: DISCONTINUED | OUTPATIENT
Start: 2024-12-01 | End: 2024-12-01 | Stop reason: SDUPTHER

## 2024-12-01 RX ORDER — FENTANYL CITRATE 50 UG/ML
INJECTION, SOLUTION INTRAMUSCULAR; INTRAVENOUS
Status: DISCONTINUED | OUTPATIENT
Start: 2024-12-01 | End: 2024-12-01 | Stop reason: SDUPTHER

## 2024-12-01 RX ORDER — SODIUM CHLORIDE 0.9 % (FLUSH) 0.9 %
5-40 SYRINGE (ML) INJECTION EVERY 12 HOURS SCHEDULED
Status: DISCONTINUED | OUTPATIENT
Start: 2024-12-01 | End: 2024-12-04 | Stop reason: HOSPADM

## 2024-12-01 RX ORDER — ONDANSETRON 2 MG/ML
INJECTION INTRAMUSCULAR; INTRAVENOUS
Status: DISCONTINUED | OUTPATIENT
Start: 2024-12-01 | End: 2024-12-01 | Stop reason: SDUPTHER

## 2024-12-01 RX ORDER — INSULIN LISPRO 100 [IU]/ML
9 INJECTION, SOLUTION INTRAVENOUS; SUBCUTANEOUS
Status: DISCONTINUED | OUTPATIENT
Start: 2024-12-01 | End: 2024-12-02

## 2024-12-01 RX ORDER — SODIUM CHLORIDE 0.9 % (FLUSH) 0.9 %
5-40 SYRINGE (ML) INJECTION EVERY 12 HOURS SCHEDULED
Status: DISCONTINUED | OUTPATIENT
Start: 2024-12-01 | End: 2024-12-01 | Stop reason: SDUPTHER

## 2024-12-01 RX ADMIN — PIPERACILLIN AND TAZOBACTAM 3375 MG: 3; .375 INJECTION, POWDER, LYOPHILIZED, FOR SOLUTION INTRAVENOUS at 17:50

## 2024-12-01 RX ADMIN — SODIUM CHLORIDE, PRESERVATIVE FREE 10 ML: 5 INJECTION INTRAVENOUS at 11:02

## 2024-12-01 RX ADMIN — ATORVASTATIN CALCIUM 40 MG: 40 TABLET, FILM COATED ORAL at 20:21

## 2024-12-01 RX ADMIN — LIDOCAINE HYDROCHLORIDE 40 MG: 20 INJECTION, SOLUTION EPIDURAL; INFILTRATION; INTRACAUDAL; PERINEURAL at 08:56

## 2024-12-01 RX ADMIN — SODIUM CHLORIDE, POTASSIUM CHLORIDE, SODIUM LACTATE AND CALCIUM CHLORIDE: 600; 310; 30; 20 INJECTION, SOLUTION INTRAVENOUS at 08:51

## 2024-12-01 RX ADMIN — INSULIN GLARGINE 30 UNITS: 100 INJECTION, SOLUTION SUBCUTANEOUS at 20:22

## 2024-12-01 RX ADMIN — OXYCODONE 5 MG: 5 TABLET ORAL at 17:44

## 2024-12-01 RX ADMIN — VANCOMYCIN 1750 MG: 1.25 INJECTION, SOLUTION INTRAVENOUS at 21:32

## 2024-12-01 RX ADMIN — FENTANYL CITRATE 50 MCG: 50 INJECTION, SOLUTION INTRAMUSCULAR; INTRAVENOUS at 09:21

## 2024-12-01 RX ADMIN — INSULIN LISPRO 9 UNITS: 100 INJECTION, SOLUTION INTRAVENOUS; SUBCUTANEOUS at 18:01

## 2024-12-01 RX ADMIN — FLUTICASONE PROPIONATE 1 SPRAY: 50 SPRAY, METERED NASAL at 13:53

## 2024-12-01 RX ADMIN — INSULIN LISPRO 9 UNITS: 100 INJECTION, SOLUTION INTRAVENOUS; SUBCUTANEOUS at 12:53

## 2024-12-01 RX ADMIN — PIPERACILLIN AND TAZOBACTAM 3375 MG: 3; .375 INJECTION, POWDER, LYOPHILIZED, FOR SOLUTION INTRAVENOUS at 09:01

## 2024-12-01 RX ADMIN — FAMOTIDINE 20 MG: 20 TABLET, FILM COATED ORAL at 20:21

## 2024-12-01 RX ADMIN — INSULIN LISPRO 8 UNITS: 100 INJECTION, SOLUTION INTRAVENOUS; SUBCUTANEOUS at 18:00

## 2024-12-01 RX ADMIN — PROPOFOL 150 MCG/KG/MIN: 10 INJECTION, EMULSION INTRAVENOUS at 09:32

## 2024-12-01 RX ADMIN — PIPERACILLIN AND TAZOBACTAM 3375 MG: 3; .375 INJECTION, POWDER, LYOPHILIZED, FOR SOLUTION INTRAVENOUS at 00:05

## 2024-12-01 RX ADMIN — INSULIN LISPRO 4 UNITS: 100 INJECTION, SOLUTION INTRAVENOUS; SUBCUTANEOUS at 12:53

## 2024-12-01 RX ADMIN — PROPOFOL 50 MG: 10 INJECTION, EMULSION INTRAVENOUS at 09:00

## 2024-12-01 RX ADMIN — VANCOMYCIN 1500 MG: 1.5 INJECTION, SOLUTION INTRAVENOUS at 07:06

## 2024-12-01 RX ADMIN — ENOXAPARIN SODIUM 30 MG: 100 INJECTION SUBCUTANEOUS at 20:21

## 2024-12-01 RX ADMIN — FENTANYL CITRATE 50 MCG: 50 INJECTION, SOLUTION INTRAMUSCULAR; INTRAVENOUS at 08:56

## 2024-12-01 RX ADMIN — SODIUM CHLORIDE, PRESERVATIVE FREE 10 ML: 5 INJECTION INTRAVENOUS at 20:28

## 2024-12-01 RX ADMIN — PROPOFOL 50 MG: 10 INJECTION, EMULSION INTRAVENOUS at 08:59

## 2024-12-01 RX ADMIN — OXYCODONE 5 MG: 5 TABLET ORAL at 13:21

## 2024-12-01 RX ADMIN — PROPOFOL 150 MCG/KG/MIN: 10 INJECTION, EMULSION INTRAVENOUS at 09:02

## 2024-12-01 RX ADMIN — DEXAMETHASONE SODIUM PHOSPHATE 8 MG: 4 INJECTION INTRA-ARTICULAR; INTRALESIONAL; INTRAMUSCULAR; INTRAVENOUS; SOFT TISSUE at 09:03

## 2024-12-01 RX ADMIN — INSULIN LISPRO 8 UNITS: 100 INJECTION, SOLUTION INTRAVENOUS; SUBCUTANEOUS at 20:22

## 2024-12-01 RX ADMIN — ONDANSETRON 4 MG: 2 INJECTION INTRAMUSCULAR; INTRAVENOUS at 09:03

## 2024-12-01 RX ADMIN — MIDAZOLAM HYDROCHLORIDE 2 MG: 1 INJECTION, SOLUTION INTRAMUSCULAR; INTRAVENOUS at 08:51

## 2024-12-01 RX ADMIN — VANCOMYCIN 1750 MG: 1.25 INJECTION, SOLUTION INTRAVENOUS at 13:30

## 2024-12-01 RX ADMIN — PROPOFOL 30 MG: 10 INJECTION, EMULSION INTRAVENOUS at 09:01

## 2024-12-01 ASSESSMENT — PAIN DESCRIPTION - LOCATION
LOCATION: FOOT

## 2024-12-01 ASSESSMENT — PAIN SCALES - GENERAL
PAINLEVEL_OUTOF10: 0
PAINLEVEL_OUTOF10: 6
PAINLEVEL_OUTOF10: 2
PAINLEVEL_OUTOF10: 0
PAINLEVEL_OUTOF10: 6

## 2024-12-01 ASSESSMENT — PAIN SCALES - WONG BAKER
WONGBAKER_NUMERICALRESPONSE: NO HURT

## 2024-12-01 ASSESSMENT — PAIN DESCRIPTION - DESCRIPTORS
DESCRIPTORS: ACHING
DESCRIPTORS: ACHING

## 2024-12-01 ASSESSMENT — PAIN DESCRIPTION - ORIENTATION
ORIENTATION: RIGHT
ORIENTATION: RIGHT

## 2024-12-01 NOTE — PROGRESS NOTES
Pharmacy Antimicrobial Kinetic Dosing    Indication for Antimicrobials: SSTI     Current Regimen of Each Antimicrobial:  Vanco - pharmacy dosing; Start Date ; Day # 3  Zosyn 4.5 g IV once then 3.375 g IV Q8H; Start Date ; Day # 3    Previous Antimicrobial Therapy:  NA     Goal Level: Vancomycin -600    Date Dose & Interval Measured (mcg/mL) Predicted AUC 24-48 Predicted AUC 24,ss    0600 1500mg q 12h 4.6 282 290                   Significant Cultures:   : blood cx pending    Labs:  Recent Labs     Units 24  0600 24  0620 24  0901   CREATININE MG/DL 0.68* 0.55* 0.94   BUN MG/DL 14 16 21*   PROCAL ng/mL  --   --  0.63   WBC K/uL 13.3*  --  12.4*     Temp (24hrs), Av.2 °F (37.3 °C), Min:98.3 °F (36.8 °C), Max:100.2 °F (37.9 °C)    Conditions for Dosing Consideration: None    Creatinine Clearance (mL/min): Estimated Creatinine Clearance: 156 mL/min (A) (based on SCr of 0.68 mg/dL (L)).     Impression/Plan:   Scr improved  Vancomycin 2500 mg IV once then 1500 mg IV Q12H was subtherapeutic.   Changed dose to 1750mg q 8h  Predicted ZVE41-15 = 472, Predicted AUC24,ss = 498  Antimicrobial stop date 7 days     Pharmacy will follow daily and adjust medications as appropriate for renal function and/or serum levels.    Thank you,  Natan Coughlin RPH

## 2024-12-01 NOTE — PROGRESS NOTES
This RN called to room by Dr Peoples due to pt c/o \"wrap to right ankle very tight and uncomfortable\". This RN able to slip two fingers between the skin and ace wrap, +posterior tibialis pulse, ankle and toes warm and dry. Notified Dr. Salazar via perfect serve.  12:17 Response from Dr. Salazar via perfect serve, OK to loosen the wrap if it helps the pt.

## 2024-12-01 NOTE — PLAN OF CARE
Problem: Chronic Conditions and Co-morbidities  Goal: Patient's chronic conditions and co-morbidity symptoms are monitored and maintained or improved  Outcome: Progressing     Problem: Chronic Conditions and Co-morbidities  Goal: Patient's chronic conditions and co-morbidity symptoms are monitored and maintained or improved  Outcome: Progressing     Problem: Discharge Planning  Goal: Discharge to home or other facility with appropriate resources  Outcome: Progressing     Problem: Discharge Planning  Goal: Discharge to home or other facility with appropriate resources  Outcome: Progressing     Problem: Pain  Goal: Verbalizes/displays adequate comfort level or baseline comfort level  Outcome: Progressing     Problem: Pain  Goal: Verbalizes/displays adequate comfort level or baseline comfort level  Outcome: Progressing     Problem: Skin/Tissue Integrity - Adult  Goal: Incisions, wounds, or drain sites healing without S/S of infection  Outcome: Progressing     Problem: Skin/Tissue Integrity - Adult  Goal: Incisions, wounds, or drain sites healing without S/S of infection  Outcome: Progressing     Problem: Musculoskeletal - Adult  Goal: Return mobility to safest level of function  Outcome: Progressing     Problem: Safety - Adult  Goal: Free from fall injury  Outcome: Progressing

## 2024-12-01 NOTE — PROGRESS NOTES
End of Shift Note    Bedside shift change report given to Shamika (oncoming nurse) by Mariah Palacios RN (offgoing nurse).  Report included the following information SBAR, Kardex, MAR, and Recent Results    Shift worked:  7p-7a     Shift summary and any significant changes:     uneventful  Npo after midnight, report given to Paulo in PACU    Concerns for physician to address:  None      Zone phone for oncoming shift:   [phone]       Activity:  Level of Assistance: Independent after set-up  Number times ambulated in hallways past shift:   Number of times OOB to chair past shift:     Cardiac:   Cardiac Monitoring:       Cardiac Rhythm: Sinus tachy    Access:  Current line(s):     Genitourinary:   Urinary Status: Voiding, Bathroom privileges    Respiratory:   O2 Device: None (Room air)  Chronic home O2 use?:   Incentive spirometer at bedside:     GI:  Last BM (including prior to admit): 11/30/24  Current diet:  Diet NPO  Passing flatus:     Pain Management:   Patient states pain is manageable on current regimen:     Skin:  Isaiah Scale Score: 19  Interventions: Wound Offloading (Prevention Methods): Elevate heels, Pillows, Repositioning, Turning, Walker    Patient Safety:  Fall Risk: Nursing Judgement-Fall Risk High(Add Comments): No  Fall Risk Interventions  Nursing Judgement-Fall Risk High(Add Comments): No  Toilet Every 2 Hours-In Advance of Need: Yes  Hourly Visual Checks: Awake, In bed  Fall Visual Posted: Fall sign posted, Socks  Room Door Open: Yes  Alarm On: Bed  Patient Moved Closer to Nursing Station: No    Active Consults:   IP CONSULT TO HOSPITALIST  PHARMACY TO DOSE VANCOMYCIN  IP CONSULT TO PODIATRY    Length of Stay:  Expected LOS: 3  Actual LOS: 2    Mariah Palacios RN

## 2024-12-01 NOTE — PROGRESS NOTES
Post op patient will need further surgical treatment and may need vascular consult and ID involvement due to the intra op findings   Will follow the patient for progress and plan further

## 2024-12-01 NOTE — BRIEF OP NOTE
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       Procedure(s):  RIGHT FIFTH TOE AMPUTATION AND INCISION AND DRAINAGE OF RIGHT FOOT    Surgeon(s):  Cecilio Salazar DPM    Assistant:  * No surgical staff found *    Anesthesia: MAC    Hemostasis: Ankle Tourniquet at 250 mm Hg on for 9 mins then released with the hyperemia in the forefoot and with good cap refill no hemorrhage thru the tissue proximal to the resection minimal return was checked with strong doppler triphasic signal after the release of the tourniquet     Estimated Blood Loss (mL): none    Complications: None    Specimens:   ID Type Source Tests Collected by Time Destination   1 : RIGHT FIFTH TOE Tissue Foot SURGICAL PATHOLOGY Cecilio Salazar DPM 12/1/2024 0918    A : RIGHT FOOT WOUND CULTURES Swab Foot CULTURE, ANAEROBIC, CULTURE, WOUND Cecilio Salazar DPM 12/1/2024 0918    B : PROXIMAL RIGHT FOOT WOUND CULTURES Swab Foot CULTURE, ANAEROBIC, CULTURE, WOUND Cecilio Salazar DPM 12/1/2024 0923        Implants:  * No implants in log *      Drains: * No LDAs found * packed with 1/3\" iodoform packing     Findings:  Infection Present At Time Of Surgery (PATOS) (choose all levels that have infection present):  - Deep Infection (muscle/fascia) present as evidenced by fluid consistent with infection  Other Findings: evidence of regional vessel clotting sporadic in the tissue and resulted local tissue necrosis and subtle signs of serous and possibly limited purulence and no odor or gas packets or drain able fluid collection areas, even the head of the 5th met shoed some superficial evidence of avascular changes but no compromised integrity of the bone.   The wound left open and

## 2024-12-01 NOTE — PROGRESS NOTES
Hospitalist Progress Note    NAME:   Milton Hughes   : 1970   MRN: [id number]     Date/Time: 2024 9:28 AM  Patient PCP: No primary care provider on file.    Estimated discharge date: 12/3/2024  Barriers:     Pending intraoperative culture result for antibiotics finalization      Interim history 2024:   Patient came back from the surgery.  The any pain at the surgery site.  But reported tightness in the right ankle after the surgery.   Plan of care communicated with the patient in detail and all questions were answered.  Communicated the plan of care  With bedside nurse      Assessment / Plan:      Poorly controlled diabetes  - With A1c of 10  - Patient does not take any medication, has not seen his primary care doctor for many years  ---Currently on glargine 25 units nightly and 7 units of lispro before meals, patient used total 14 units of sliding scale.  So increase the glargine to 30 units and lispro to 9 units 3 times daily with meals  -- continue sliding scale insulin      Hyperlipidemia  - LDL of 93, given patient is high risk with uncontrolled diabetes and LDL needs to be less than 70 started on Lipitor 40 mg daily         Hypertension  - Given patient is diabetic and renal protective effect of ACE inhibitors, I stopped amlodipine and started on lisinopril 40 mg daily  Given patient is high risk for coronary artery disease recommend baby aspirin 81 mg daily        Diabetic foot ulcer with gangrene of right fifth toe  Currently on broad-spectrum antibiotic Zosyn and vancomycin,  Status post debridement amputation on 2024  Will follow-up on intraoperative cultures  Pain control  Tylenol as needed  Oxycodone and Dilaudid  Patient reported significant tightness in the right ankle, per the nurse if she can lose the bandage, otherwise will communicate with the podiatry            Medical Decision Making:   I personally reviewed labs: yes all   I personally reviewed imaging: yes   I  personally reviewed EKG:yes   Toxic drug monitoring: Mag level, creatinine functions as patient is on vancomycin and Zosyn, hypoglycemia , needed for bleeding as patient is on aspirin    discussed case with: Patient, bedside nurse, podiatry        Code Status: Full code  DVT Prophylaxis: Lovenox  GI Prophylaxis: Famotidine    Objective:     VITALS:   Last 24hrs VS reviewed since prior progress note. Most recent are:  Patient Vitals for the past 24 hrs:   BP Temp Temp src Pulse Resp SpO2 Weight   12/01/24 0739 138/82 98.4 °F (36.9 °C) Oral 86 15 98 % --   12/01/24 0600 -- -- -- -- -- -- 106.6 kg (235 lb)   11/30/24 2302 139/82 100.2 °F (37.9 °C) -- 95 17 97 % --   11/30/24 1944 127/78 99.3 °F (37.4 °C) Oral 88 17 97 % --   11/30/24 1539 (!) 158/86 100 °F (37.8 °C) Oral 95 -- 96 % --   11/30/24 1258 135/68 98.3 °F (36.8 °C) Oral 79 17 97 % --   11/30/24 1245 -- -- -- -- -- 96 % --         Intake/Output Summary (Last 24 hours) at 12/1/2024 0928  Last data filed at 12/1/2024 0901  Gross per 24 hour   Intake 750 ml   Output --   Net 750 ml        I had a face to face encounter and independently examined this patient on 12/1/2024, as outlined below:  PHYSICAL EXAM:  General: Alert, cooperative  EENT:  EOMI. Anicteric sclerae.  Resp:  CTA bilaterally, no wheezing or rales.  No accessory muscle use  CV:  Regular  rhythm,  No edema  GI:  Soft, Non distended, Non tender.  +Bowel sounds  Neurologic:  Alert and oriented X 3, normal speech,   Psych:   Good insight. Not anxious nor agitated  Skin:  Redness on the right shin, right toe was wrapped with bandage.  Bandage was clean dry and intact    Reviewed most current lab test results and cultures  YES  Reviewed most current radiology test results   YES  Review and summation of old records today    NO  Reviewed patient's current orders and MAR    YES  PMH/SH reviewed - no change compared to H&P    Procedures: see electronic medical records for all procedures/Xrays and details

## 2024-12-01 NOTE — PROGRESS NOTES
Hospitalist Progress Note    NAME:   Milton Hughes   : 1970   MRN: [id number]     Date/Time: 2024 11:30 PM  Patient PCP: No primary care provider on file.    Estimated discharge date:  Barriers:       Assessment / Plan:  Poorly controlled diabetes  - With A1c of 10  - Patient does not take any medication, has not seen his primary care doctor for many years  - Adjust insulin regimen with 20 units of Lantus at bedtime and 7 units of lispro before meals  - LDL of 93, given patient is high risk and LDL needs to be less than 70 started on Lipitor 40 mg daily     Hypertension  - Given patient is diabetic and renal protective effect of ACE inhibitors, I stopped amlodipine and started on lisinopril 40 mg daily  Given patient is high risk for coronary artery disease recommend baby aspirin 81 mg daily    Diabetic foot ulcer with gangrene of right fifth toe  Currently on broad-spectrum antibiotic Zosyn and vancomycin,  N.p.o. after midnight  Patient going for debridement amputation tomorrow            Medical Decision Making:   I personally reviewed labs:  I personally reviewed imaging:  I personally reviewed EKG:  Toxic drug monitoring:   Discussed case with:         Code Status:   DVT Prophylaxis:   GI Prophylaxis:    Subjective:     Chief Complaint / Reason for Physician Visit  Discussed with RN events overnight.       Objective:     VITALS:   Last 24hrs VS reviewed since prior progress note. Most recent are:  Patient Vitals for the past 24 hrs:   BP Temp Temp src Pulse Resp SpO2 Weight   24 2302 139/82 100.2 °F (37.9 °C) -- 95 17 97 % --   24 1944 127/78 99.3 °F (37.4 °C) Oral 88 17 97 % --   24 1539 (!) 158/86 100 °F (37.8 °C) Oral 95 -- 96 % --   24 1258 135/68 98.3 °F (36.8 °C) Oral 79 17 97 % --   24 1245 -- -- -- -- -- 96 % --   24 0736 139/83 97.9 °F (36.6 °C) Oral 86 -- 97 % --   24 0630 -- -- -- -- -- -- 106.9 kg (235 lb 10.8 oz)   24 0316 125/74  98.4 °F (36.9 °C) Oral 89 18 97 % --         Intake/Output Summary (Last 24 hours) at 11/30/2024 2330  Last data filed at 11/30/2024 1809  Gross per 24 hour   Intake 831.77 ml   Output 1675 ml   Net -843.23 ml        I had a face to face encounter and independently examined this patient on 11/30/2024, as outlined below:  PHYSICAL EXAM:  General: Alert, cooperative  EENT:  EOMI. Anicteric sclerae.  Resp:  CTA bilaterally, no wheezing or rales.  No accessory muscle use  CV:  Regular  rhythm,  No edema  GI:  Soft, Non distended, Non tender.  +Bowel sounds  Neurologic:  Alert and oriented X 3, normal speech,   Psych:   Good insight. Not anxious nor agitated  Skin:  No rashes.  No jaundice    Reviewed most current lab test results and cultures  YES  Reviewed most current radiology test results   YES  Review and summation of old records today    NO  Reviewed patient's current orders and MAR    YES  PMH/SH reviewed - no change compared to H&P    Procedures: see electronic medical records for all procedures/Xrays and details which were not copied into this note but were reviewed prior to creation of Plan.      LABS:  I reviewed today's most current labs and imaging studies.  Pertinent labs include:  Recent Labs     11/29/24  0901   WBC 12.4*   HGB 16.7   HCT 45.6   *     Recent Labs     11/29/24  0901 11/30/24  0620   * 136   K 4.2 3.7   CL 98 106   CO2 27 25   GLUCOSE 404* 248*   BUN 21* 16   CREATININE 0.94 0.55*   CALCIUM 8.8 8.1*   BILITOT 2.5*  --    AST 32  --    ALT 64  --        Signed: Frandy Gusman MD

## 2024-12-02 LAB
ANION GAP SERPL CALC-SCNC: 5 MMOL/L (ref 2–12)
BASOPHILS # BLD: 0 K/UL (ref 0–0.1)
BASOPHILS NFR BLD: 0 % (ref 0–1)
BUN SERPL-MCNC: 16 MG/DL (ref 6–20)
BUN/CREAT SERPL: 21 (ref 12–20)
CALCIUM SERPL-MCNC: 8.2 MG/DL (ref 8.5–10.1)
CHLORIDE SERPL-SCNC: 104 MMOL/L (ref 97–108)
CO2 SERPL-SCNC: 26 MMOL/L (ref 21–32)
CREAT SERPL-MCNC: 0.75 MG/DL (ref 0.7–1.3)
DIFFERENTIAL METHOD BLD: ABNORMAL
EOSINOPHIL # BLD: 0 K/UL (ref 0–0.4)
EOSINOPHIL NFR BLD: 0 % (ref 0–7)
ERYTHROCYTE [DISTWIDTH] IN BLOOD BY AUTOMATED COUNT: 11.9 % (ref 11.5–14.5)
GLUCOSE BLD STRIP.AUTO-MCNC: 287 MG/DL (ref 65–117)
GLUCOSE BLD STRIP.AUTO-MCNC: 328 MG/DL (ref 65–117)
GLUCOSE BLD STRIP.AUTO-MCNC: 329 MG/DL (ref 65–117)
GLUCOSE BLD STRIP.AUTO-MCNC: 346 MG/DL (ref 65–117)
GLUCOSE SERPL-MCNC: 366 MG/DL (ref 65–100)
HCT VFR BLD AUTO: 39.4 % (ref 36.6–50.3)
HGB BLD-MCNC: 14.3 G/DL (ref 12.1–17)
IMM GRANULOCYTES # BLD AUTO: 0.2 K/UL (ref 0–0.04)
IMM GRANULOCYTES NFR BLD AUTO: 2 % (ref 0–0.5)
LYMPHOCYTES # BLD: 0.6 K/UL (ref 0.8–3.5)
LYMPHOCYTES NFR BLD: 5 % (ref 12–49)
MCH RBC QN AUTO: 31.8 PG (ref 26–34)
MCHC RBC AUTO-ENTMCNC: 36.3 G/DL (ref 30–36.5)
MCV RBC AUTO: 87.6 FL (ref 80–99)
MONOCYTES # BLD: 0.6 K/UL (ref 0–1)
MONOCYTES NFR BLD: 5 % (ref 5–13)
NEUTS SEG # BLD: 10.3 K/UL (ref 1.8–8)
NEUTS SEG NFR BLD: 88 % (ref 32–75)
NRBC # BLD: 0 K/UL (ref 0–0.01)
NRBC BLD-RTO: 0 PER 100 WBC
PLATELET # BLD AUTO: 122 K/UL (ref 150–400)
PMV BLD AUTO: 10 FL (ref 8.9–12.9)
POTASSIUM SERPL-SCNC: 4.3 MMOL/L (ref 3.5–5.1)
RBC # BLD AUTO: 4.5 M/UL (ref 4.1–5.7)
SERVICE CMNT-IMP: ABNORMAL
SODIUM SERPL-SCNC: 135 MMOL/L (ref 136–145)
VANCOMYCIN SERPL-MCNC: 12.5 UG/ML
WBC # BLD AUTO: 11.7 K/UL (ref 4.1–11.1)

## 2024-12-02 PROCEDURE — 1100000003 HC PRIVATE W/ TELEMETRY

## 2024-12-02 PROCEDURE — 85025 COMPLETE CBC W/AUTO DIFF WBC: CPT

## 2024-12-02 PROCEDURE — 2580000003 HC RX 258: Performed by: ANESTHESIOLOGY

## 2024-12-02 PROCEDURE — 6370000000 HC RX 637 (ALT 250 FOR IP): Performed by: PODIATRIST

## 2024-12-02 PROCEDURE — 6370000000 HC RX 637 (ALT 250 FOR IP): Performed by: STUDENT IN AN ORGANIZED HEALTH CARE EDUCATION/TRAINING PROGRAM

## 2024-12-02 PROCEDURE — 80202 ASSAY OF VANCOMYCIN: CPT

## 2024-12-02 PROCEDURE — 36415 COLL VENOUS BLD VENIPUNCTURE: CPT

## 2024-12-02 PROCEDURE — 2580000003 HC RX 258: Performed by: PODIATRIST

## 2024-12-02 PROCEDURE — 6360000002 HC RX W HCPCS: Performed by: STUDENT IN AN ORGANIZED HEALTH CARE EDUCATION/TRAINING PROGRAM

## 2024-12-02 PROCEDURE — 6360000002 HC RX W HCPCS: Performed by: PODIATRIST

## 2024-12-02 PROCEDURE — 82962 GLUCOSE BLOOD TEST: CPT

## 2024-12-02 PROCEDURE — 80048 BASIC METABOLIC PNL TOTAL CA: CPT

## 2024-12-02 RX ORDER — INSULIN LISPRO 100 [IU]/ML
12 INJECTION, SOLUTION INTRAVENOUS; SUBCUTANEOUS
Status: DISCONTINUED | OUTPATIENT
Start: 2024-12-02 | End: 2024-12-02

## 2024-12-02 RX ORDER — INSULIN GLARGINE 100 [IU]/ML
6 INJECTION, SOLUTION SUBCUTANEOUS ONCE
Status: COMPLETED | OUTPATIENT
Start: 2024-12-02 | End: 2024-12-02

## 2024-12-02 RX ORDER — INSULIN LISPRO 100 [IU]/ML
10 INJECTION, SOLUTION INTRAVENOUS; SUBCUTANEOUS
Status: DISCONTINUED | OUTPATIENT
Start: 2024-12-02 | End: 2024-12-03

## 2024-12-02 RX ORDER — INSULIN GLARGINE 100 [IU]/ML
38 INJECTION, SOLUTION SUBCUTANEOUS NIGHTLY
Status: DISCONTINUED | OUTPATIENT
Start: 2024-12-02 | End: 2024-12-03

## 2024-12-02 RX ADMIN — PIPERACILLIN AND TAZOBACTAM 3375 MG: 3; .375 INJECTION, POWDER, LYOPHILIZED, FOR SOLUTION INTRAVENOUS at 18:25

## 2024-12-02 RX ADMIN — INSULIN LISPRO 4 UNITS: 100 INJECTION, SOLUTION INTRAVENOUS; SUBCUTANEOUS at 18:11

## 2024-12-02 RX ADMIN — LISINOPRIL 40 MG: 20 TABLET ORAL at 08:42

## 2024-12-02 RX ADMIN — INSULIN GLARGINE 6 UNITS: 100 INJECTION, SOLUTION SUBCUTANEOUS at 11:16

## 2024-12-02 RX ADMIN — ASPIRIN 81 MG: 81 TABLET, CHEWABLE ORAL at 08:42

## 2024-12-02 RX ADMIN — VANCOMYCIN 1750 MG: 1.25 INJECTION, SOLUTION INTRAVENOUS at 23:53

## 2024-12-02 RX ADMIN — VANCOMYCIN 1750 MG: 1.25 INJECTION, SOLUTION INTRAVENOUS at 14:10

## 2024-12-02 RX ADMIN — VANCOMYCIN 1750 MG: 1.25 INJECTION, SOLUTION INTRAVENOUS at 05:44

## 2024-12-02 RX ADMIN — FAMOTIDINE 20 MG: 20 TABLET, FILM COATED ORAL at 22:53

## 2024-12-02 RX ADMIN — SODIUM CHLORIDE, PRESERVATIVE FREE 10 ML: 5 INJECTION INTRAVENOUS at 22:33

## 2024-12-02 RX ADMIN — PIPERACILLIN AND TAZOBACTAM 3375 MG: 3; .375 INJECTION, POWDER, LYOPHILIZED, FOR SOLUTION INTRAVENOUS at 00:01

## 2024-12-02 RX ADMIN — OXYCODONE 5 MG: 5 TABLET ORAL at 20:22

## 2024-12-02 RX ADMIN — INSULIN LISPRO 10 UNITS: 100 INJECTION, SOLUTION INTRAVENOUS; SUBCUTANEOUS at 13:53

## 2024-12-02 RX ADMIN — PIPERACILLIN AND TAZOBACTAM 3375 MG: 3; .375 INJECTION, POWDER, LYOPHILIZED, FOR SOLUTION INTRAVENOUS at 08:52

## 2024-12-02 RX ADMIN — OXYCODONE 5 MG: 5 TABLET ORAL at 02:18

## 2024-12-02 RX ADMIN — ENOXAPARIN SODIUM 30 MG: 100 INJECTION SUBCUTANEOUS at 08:42

## 2024-12-02 RX ADMIN — INSULIN LISPRO 10 UNITS: 100 INJECTION, SOLUTION INTRAVENOUS; SUBCUTANEOUS at 18:11

## 2024-12-02 RX ADMIN — OXYCODONE 5 MG: 5 TABLET ORAL at 06:18

## 2024-12-02 RX ADMIN — INSULIN LISPRO 6 UNITS: 100 INJECTION, SOLUTION INTRAVENOUS; SUBCUTANEOUS at 08:44

## 2024-12-02 RX ADMIN — ENOXAPARIN SODIUM 30 MG: 100 INJECTION SUBCUTANEOUS at 22:28

## 2024-12-02 RX ADMIN — INSULIN LISPRO 6 UNITS: 100 INJECTION, SOLUTION INTRAVENOUS; SUBCUTANEOUS at 13:52

## 2024-12-02 RX ADMIN — INSULIN LISPRO 9 UNITS: 100 INJECTION, SOLUTION INTRAVENOUS; SUBCUTANEOUS at 08:46

## 2024-12-02 RX ADMIN — ATORVASTATIN CALCIUM 40 MG: 40 TABLET, FILM COATED ORAL at 22:52

## 2024-12-02 RX ADMIN — OXYCODONE 5 MG: 5 TABLET ORAL at 11:00

## 2024-12-02 RX ADMIN — INSULIN GLARGINE 38 UNITS: 100 INJECTION, SOLUTION SUBCUTANEOUS at 22:33

## 2024-12-02 RX ADMIN — OXYCODONE 5 MG: 5 TABLET ORAL at 15:59

## 2024-12-02 RX ADMIN — FAMOTIDINE 20 MG: 20 TABLET, FILM COATED ORAL at 08:42

## 2024-12-02 RX ADMIN — INSULIN LISPRO 6 UNITS: 100 INJECTION, SOLUTION INTRAVENOUS; SUBCUTANEOUS at 22:28

## 2024-12-02 RX ADMIN — FLUTICASONE PROPIONATE 1 SPRAY: 50 SPRAY, METERED NASAL at 18:29

## 2024-12-02 ASSESSMENT — PAIN DESCRIPTION - LOCATION
LOCATION: FOOT

## 2024-12-02 ASSESSMENT — PAIN SCALES - GENERAL
PAINLEVEL_OUTOF10: 7
PAINLEVEL_OUTOF10: 8
PAINLEVEL_OUTOF10: 7
PAINLEVEL_OUTOF10: 0
PAINLEVEL_OUTOF10: 1
PAINLEVEL_OUTOF10: 8
PAINLEVEL_OUTOF10: 7

## 2024-12-02 ASSESSMENT — PAIN DESCRIPTION - DESCRIPTORS
DESCRIPTORS: ACHING;DISCOMFORT
DESCRIPTORS: ACHING
DESCRIPTORS: THROBBING
DESCRIPTORS: THROBBING

## 2024-12-02 ASSESSMENT — PAIN SCALES - WONG BAKER: WONGBAKER_NUMERICALRESPONSE: NO HURT

## 2024-12-02 ASSESSMENT — PAIN DESCRIPTION - ORIENTATION
ORIENTATION: RIGHT

## 2024-12-02 NOTE — PROGRESS NOTES
End of Shift Note    Bedside shift change report given to Liat (oncoming nurse) by Mariah Palacios RN (offgoing nurse).  Report included the following information SBAR, Kardex, MAR, Accordion, Recent Results, and Cardiac Rhythm      Shift worked:  7p-7a     Shift summary and any significant changes:     Uneventful, non compliant with diet     Concerns for physician to address:  Endocrine consult ? Diabetic educator?  Also starting setting a timer for pain [facility] phone for oncoming shift:   [phone]       Activity:  Level of Assistance: Independent after set-up  Number times ambulated in hallways past shift:   Number of times OOB to chair past shift:     Cardiac:   Cardiac Monitoring:       Cardiac Rhythm: Sinus rhythm    Access:  Current line(s):     Genitourinary:   Urinary Status: Voiding    Respiratory:   O2 Device: None (Room air)  Chronic home O2 use?:   Incentive spirometer at bedside:     GI:  Last BM (including prior to admit): 12/01/24  Current diet:  ADULT DIET; Regular; 3 carb choices (45 gm/meal)  Passing flatus:     Pain Management:   Patient states pain is manageable on current regimen:     Skin:  Isaiah Scale Score: 19  Interventions: Wound Offloading (Prevention Methods): Elevate heels, Repositioning, Pillows, Turning, Walker    Patient Safety:  Fall Risk: Nursing Judgement-Fall Risk High(Add Comments): No  Fall Risk Interventions  Nursing Judgement-Fall Risk High(Add Comments): No  Toilet Every 2 Hours-In Advance of Need: Yes  Hourly Visual Checks: Awake, In bed  Fall Visual Posted: Fall sign posted, Socks  Room Door Open: Yes  Alarm On: Bed  Patient Moved Closer to Nursing Station: No    Active Consults:   IP CONSULT TO HOSPITALIST  PHARMACY TO DOSE VANCOMYCIN  IP CONSULT TO PODIATRY    Length of Stay:  Expected LOS: 3  Actual LOS: 3    Mariah Palacios, RN

## 2024-12-02 NOTE — PROGRESS NOTES
Pharmacy Antimicrobial Kinetic Dosing    Indication for Antimicrobials: SSTI     Current Regimen of Each Antimicrobial:  Vanco - pharmacy dosing; Start Date ; Day # 4  Zosyn 4.5 g IV once then 3.375 g IV Q8H; Start Date ; Day # 4    Previous Antimicrobial Therapy:  NA     Goal Level: Vancomycin -600    Date Dose & Interval Measured (mcg/mL) Predicted AUC 24-48 Predicted AUC 24,ss    0600 1500mg q 12h 4.6 282 290    1750mg q8h 12.5 434 450            Significant Cultures:   : blood - NGTD  : wound - group B strep @ prelim    Labs:  Recent Labs     Units 24  0222 24  0600 24  0620   CREATININE MG/DL 0.75 0.68* 0.55*   BUN MG/DL 16 14 16   WBC K/uL 11.7* 13.3*  --      Temp (24hrs), Av.1 °F (36.7 °C), Min:97.9 °F (36.6 °C), Max:98.4 °F (36.9 °C)    Conditions for Dosing Consideration: None    Creatinine Clearance (mL/min): Estimated Creatinine Clearance: 141 mL/min (based on SCr of 0.75 mg/dL).     Impression/Plan:   Vancomycin level resulted, therapeutic, continue current regimen  Watch SCr  Predicted [id number] = 434, Predicted AUC24,ss = 450  Antimicrobial stop date 7 days     Pharmacy will follow daily and adjust medications as appropriate for renal function and/or serum levels.    Thank you,  [place], [facility]

## 2024-12-02 NOTE — PROGRESS NOTES
Pt wife is bringing in outside food for pt to eat. While entering room, t attempted to quickly hide bag, pt educated on the importance of diet in maintaining therapeutic glucose levels, md on call made aware of glucose check 353 this evening, no new orders at this time

## 2024-12-02 NOTE — CARE COORDINATION
Care Management Initial Assessment       RUR: 11%  Readmission? No  1st IM letter given? No  1st  letter given: No    CM completed assessment w/pt via room phone.  No history of HH or DME use.  Patient is independent w/ADL to include driving.   Patient has very limited support.  Patient only reports his girlfriend as support.  Patient uses whatever pharmacy is least expensive.  No needs or concerns identified.  Patients girlfriend will transport at d/c       12/02/24 1521   Service Assessment   Patient Orientation Alert and Oriented   Cognition Alert   History Provided By Patient   Primary Caregiver Self   Support Systems Spouse/Significant Other   PCP Verified by CM Yes  (Dr Leona Gonzalez)   Last Visit to PCP Within last two years   Prior Functional Level Independent in ADLs/IADLs   Current Functional Level Independent in ADLs/IADLs   Can patient return to prior living arrangement Yes   Family able to assist with home care needs: Other (comment)  (girlfriend)   Would you like for me to discuss the discharge plan with any other family members/significant others, and if so, who? No   Financial Resources Medicaid  (pending)   Community Resources None   Social/Functional History   Lives With Significant other   Type of Home House  (two story home w/3 ROSALVA)   Home Equipment None   Active  Yes     Kristen Hardwick  Ext [phone]

## 2024-12-02 NOTE — PLAN OF CARE
Problem: Chronic Conditions and Co-morbidities  Goal: Patient's chronic conditions and co-morbidity symptoms are monitored and maintained or improved  Outcome: Progressing     Problem: Discharge Planning  Goal: Discharge to home or other facility with appropriate resources  Outcome: Progressing     Problem: Pain  Goal: Verbalizes/displays adequate comfort level or baseline comfort level  Outcome: Progressing  Flowsheets (Taken 12/2/2024 0248 by Mariah Palacios RN)  Verbalizes/displays adequate comfort level or baseline comfort level: Encourage patient to monitor pain and request assistance     Problem: Skin/Tissue Integrity - Adult  Goal: Incisions, wounds, or drain sites healing without S/S of infection  Outcome: Progressing     Problem: Musculoskeletal - Adult  Goal: Return mobility to safest level of function  Outcome: Progressing     Problem: Safety - Adult  Goal: Free from fall injury  Outcome: Progressing

## 2024-12-02 NOTE — PROGRESS NOTES
1740: , patient asymptomatic, insulin administered as ordered; Dr. Peoples was notified.    1900: , Dr. Peoples notified.

## 2024-12-02 NOTE — PROGRESS NOTES
insulin      Hyperlipidemia  - LDL of 93, given patient is high risk with uncontrolled diabetes and LDL needs to be less than 70 started on Lipitor 40 mg daily         Hypertension  - Given patient is diabetic and renal protective effect of ACE inhibitors, I stopped amlodipine and started on lisinopril 40 mg daily  Given patient is high risk for coronary artery disease recommend baby aspirin 81 mg daily            Medical Decision Making:   I personally reviewed labs: yes all   I personally reviewed imaging: yes   I personally reviewed EKG:yes   Toxic drug monitoring: BG monitoring, creatinine functions as patient is on vancomycin and Zosyn, hypoglycemia , needed for bleeding as patient is on aspirin    discussed case with: Patient, bedside nurse, podiatry, vascular        Code Status: Full code  DVT Prophylaxis: Lovenox  GI Prophylaxis: Famotidine    Objective:     VITALS:   Last 24hrs VS reviewed since prior progress note. Most recent are:  Patient Vitals for the past 24 hrs:   BP Temp Temp src Pulse Resp SpO2 Weight   12/02/24 0809 (!) 150/89 98.1 °F (36.7 °C) Oral 76 17 97 % --   12/02/24 0600 -- -- -- -- -- -- 106.6 kg (235 lb)   12/02/24 0225 135/85 97.9 °F (36.6 °C) -- 76 15 98 % --   12/01/24 2322 132/85 98.4 °F (36.9 °C) Oral 79 17 98 % --   12/01/24 1945 135/84 98.1 °F (36.7 °C) -- 83 17 95 % --   12/01/24 1814 -- -- -- -- 17 -- --   12/01/24 1501 (!) 140/89 98.4 °F (36.9 °C) Oral 89 16 97 % --   12/01/24 1351 -- -- -- -- 16 -- --   12/01/24 1142 130/78 98.1 °F (36.7 °C) Oral 89 18 94 % --   12/01/24 1100 121/75 97.9 °F (36.6 °C) Oral 72 15 97 % --   12/01/24 1042 121/77 98.1 °F (36.7 °C) Oral 79 15 96 % --   12/01/24 1030 126/76 97.9 °F (36.6 °C) Oral 79 16 95 % --         Intake/Output Summary (Last 24 hours) at 12/2/2024 1017  Last data filed at 12/1/2024 1330  Gross per 24 hour   Intake 300 ml   Output --   Net 300 ml        I had a face to face encounter and independently examined this patient on

## 2024-12-02 NOTE — CONSULTS
polyuria.   Genitourinary: Negative.    Musculoskeletal:  Positive for arthralgias, gait problem and myalgias.   Skin:  Positive for color change and wound.   Allergic/Immunologic: Negative for immunocompromised state.   Neurological:  Negative for weakness.   Hematological: Negative.    Psychiatric/Behavioral: Negative.       Objective:     Patient Vitals for the past 24 hrs:   BP Temp Temp src Pulse Resp SpO2 Weight   12/02/24 0809 (!) 150/89 98.1 °F (36.7 °C) Oral 76 17 97 % --   12/02/24 0600 -- -- -- -- -- -- 106.6 kg (235 lb)   12/02/24 0225 135/85 97.9 °F (36.6 °C) -- 76 15 98 % --   12/01/24 2322 132/85 98.4 °F (36.9 °C) Oral 79 17 98 % --   12/01/24 1945 135/84 98.1 °F (36.7 °C) -- 83 17 95 % --   12/01/24 1814 -- -- -- -- 17 -- --   12/01/24 1501 (!) 140/89 98.4 °F (36.9 °C) Oral 89 16 97 % --   12/01/24 1351 -- -- -- -- 16 -- --   12/01/24 1142 130/78 98.1 °F (36.7 °C) Oral 89 18 94 % --   12/01/24 1100 121/75 97.9 °F (36.6 °C) Oral 72 15 97 % --   12/01/24 1042 121/77 98.1 °F (36.7 °C) Oral 79 15 96 % --   12/01/24 1030 126/76 97.9 °F (36.6 °C) Oral 79 16 95 % --   12/01/24 1017 -- -- -- 83 18 -- --   12/01/24 1016 -- -- -- 86 21 97 % --   12/01/24 1015 132/88 98 °F (36.7 °C) Oral 85 19 96 % --   12/01/24 1014 -- -- -- 84 21 96 % --   12/01/24 1013 -- -- -- 89 16 96 % --   12/01/24 1012 -- -- -- 88 19 97 % --   12/01/24 1011 -- -- -- 87 15 97 % --   12/01/24 1010 124/82 -- -- 87 19 96 % --   12/01/24 1009 -- -- -- 88 18 96 % --   12/01/24 1008 -- -- -- 88 16 96 % --   12/01/24 1007 -- -- -- 86 17 97 % --   12/01/24 1006 -- -- -- 86 19 97 % --   12/01/24 1005 123/86 -- -- 87 22 98 % --   12/01/24 1004 -- -- -- 85 19 99 % --   12/01/24 1003 -- -- -- 86 16 99 % --   12/01/24 1002 -- -- -- 87 25 100 % --   12/01/24 1001 -- -- -- 83 17 99 % --   12/01/24 1000 120/84 -- -- 88 16 99 % --   12/01/24 0959 -- -- -- 83 16 99 % --   12/01/24 0958 -- -- -- 83 18 99 % --   12/01/24 0957 -- -- -- 85 17 99 % --     CO2 26 21 - 32 mmol/L    Anion Gap 5 2 - 12 mmol/L    Glucose 366 (H) 65 - 100 mg/dL    BUN 16 6 - 20 MG/DL    Creatinine 0.75 0.70 - 1.30 MG/DL    BUN/Creatinine Ratio 21 (H) 12 - 20      Est, Glom Filt Rate >90 >60 ml/min/1.73m2    Calcium 8.2 (L) 8.5 - 10.1 MG/DL   CBC with Auto Differential    Collection Time: 12/02/24  2:22 AM   Result Value Ref Range    WBC 11.7 (H) 4.1 - 11.1 K/uL    RBC 4.50 4.10 - 5.70 M/uL    Hemoglobin 14.3 12.1 - 17.0 g/dL    Hematocrit 39.4 36.6 - 50.3 %    MCV 87.6 80.0 - 99.0 FL    MCH 31.8 26.0 - 34.0 PG    MCHC 36.3 30.0 - 36.5 g/dL    RDW 11.9 11.5 - 14.5 %    Platelets 122 (L) 150 - 400 K/uL    MPV 10.0 8.9 - 12.9 FL    Nucleated RBCs 0.0 0  WBC    nRBC 0.00 0.00 - 0.01 K/uL    Neutrophils % 88 (H) 32 - 75 %    Lymphocytes % 5 (L) 12 - 49 %    Monocytes % 5 5 - 13 %    Eosinophils % 0 0 - 7 %    Basophils % 0 0 - 1 %    Immature Granulocytes % 2 (H) 0.0 - 0.5 %    Neutrophils Absolute 10.3 (H) 1.8 - 8.0 K/UL    Lymphocytes Absolute 0.6 (L) 0.8 - 3.5 K/UL    Monocytes Absolute 0.6 0.0 - 1.0 K/UL    Eosinophils Absolute 0.0 0.0 - 0.4 K/UL    Basophils Absolute 0.0 0.0 - 0.1 K/UL    Immature Granulocytes Absolute 0.2 (H) 0.00 - 0.04 K/UL    Differential Type AUTOMATED     POCT Glucose    Collection Time: 12/02/24  6:14 AM   Result Value Ref Range    POC Glucose 328 (H) 65 - 117 mg/dL    Performed by: Terry Whelan PCT        Assessmen/Plan:     Necrotizing skin infection of the right foot with underlying uncontrolled DM  -Status post right fifth toe amputation and debridement of the right foot 12/1  -Wound culture significant for streptococci, beta hemolytic Group B   -ABIs and digit indices within normal limits.  Dr. Young to evaluate and determine plan of care.  Wound care per podiatry.  Antibiotics per primary team    Uncontrolled diabetes mellitus with hyperglycemia  -on basal insulin    Hypertension  -Lisinopril     Former smoker     VTE Prophylaxis:

## 2024-12-02 NOTE — PROGRESS NOTES
Physician Progress Note      PATIENT:               LOIS TENORIO  CSN #:                  [id number]  :                       1970  ADMIT DATE:       2024 8:42 AM  DISCH DATE:  RESPONDING  PROVIDER #:        Jenna Peoples MD          QUERY TEXT:    Pt admitted with Osteomyelitis of right foot, necrotic toe. Pt noted to have   WBCs 12.4, HR 95, CRP 21.30. If possible, please document in the progress   notes and discharge summary if you are evaluating and /or treating any of the   following:    The medical record reflects the following:  Risk Factors: Osteomyelitis of right foot, necrotic toe  Clinical Indicators: WBC 12.4>13.3>11.7    98.8-95-18-15/85  LA sepsis 1.4    procal 0.63  CRP 21.30  Treatment: Abx; amputation; VS; labs  Options provided:  -- *** (organism, if known) Sepsis, present on admission  -- *** (organism if known) Sepsis, present on admission, now resolved  -- *** (organism if known) Sepsis, developed following admission  -- *** (localized infection such as pneumonia, UTI, or cellulitis) without   Sepsis  -- Sepsis was ruled out  -- Other - I will add my own diagnosis  -- Disagree - Not applicable / Not valid  -- Disagree - Clinically unable to determine / Unknown  -- Refer to Clinical Documentation Reviewer    PROVIDER RESPONSE TEXT:    This patient was treated for *** (organism, if known) sepsis this admission,   which was present on admission and is currently resolved.    Query created by: Kristen Camp on 2024 12:11 PM      Electronically signed by:  Jenna Peoples MD 2024 12:52 PM

## 2024-12-03 ENCOUNTER — ANESTHESIA EVENT (OUTPATIENT)
Facility: HOSPITAL | Age: 54
End: 2024-12-03
Payer: MEDICAID

## 2024-12-03 PROBLEM — E11.65 UNCONTROLLED TYPE 2 DIABETES MELLITUS WITH HYPERGLYCEMIA (HCC): Status: ACTIVE | Noted: 2024-12-03

## 2024-12-03 PROBLEM — I73.9 PVD (PERIPHERAL VASCULAR DISEASE) (HCC): Status: ACTIVE | Noted: 2024-12-03

## 2024-12-03 LAB
ANION GAP SERPL CALC-SCNC: 3 MMOL/L (ref 2–12)
BACTERIA SPEC CULT: NORMAL
BACTERIA SPEC CULT: NORMAL
BASOPHILS # BLD: 0.1 K/UL (ref 0–0.1)
BASOPHILS NFR BLD: 1 % (ref 0–1)
BUN SERPL-MCNC: 23 MG/DL (ref 6–20)
BUN/CREAT SERPL: 28 (ref 12–20)
CALCIUM SERPL-MCNC: 8.5 MG/DL (ref 8.5–10.1)
CHLORIDE SERPL-SCNC: 104 MMOL/L (ref 97–108)
CO2 SERPL-SCNC: 29 MMOL/L (ref 21–32)
CREAT SERPL-MCNC: 0.83 MG/DL (ref 0.7–1.3)
DIFFERENTIAL METHOD BLD: ABNORMAL
EOSINOPHIL # BLD: 0.3 K/UL (ref 0–0.4)
EOSINOPHIL NFR BLD: 3 % (ref 0–7)
ERYTHROCYTE [DISTWIDTH] IN BLOOD BY AUTOMATED COUNT: 12 % (ref 11.5–14.5)
GLUCOSE BLD STRIP.AUTO-MCNC: 173 MG/DL (ref 65–117)
GLUCOSE BLD STRIP.AUTO-MCNC: 222 MG/DL (ref 65–117)
GLUCOSE BLD STRIP.AUTO-MCNC: 237 MG/DL (ref 65–117)
GLUCOSE BLD STRIP.AUTO-MCNC: 301 MG/DL (ref 65–117)
GLUCOSE SERPL-MCNC: 367 MG/DL (ref 65–100)
HCT VFR BLD AUTO: 45.4 % (ref 36.6–50.3)
HGB BLD-MCNC: 15.7 G/DL (ref 12.1–17)
IMM GRANULOCYTES # BLD AUTO: 0 K/UL (ref 0–0.04)
IMM GRANULOCYTES NFR BLD AUTO: 1 % (ref 0–0.5)
LYMPHOCYTES # BLD: 1.1 K/UL (ref 0.8–3.5)
LYMPHOCYTES NFR BLD: 14 % (ref 12–49)
MCH RBC QN AUTO: 30.7 PG (ref 26–34)
MCHC RBC AUTO-ENTMCNC: 34.6 G/DL (ref 30–36.5)
MCV RBC AUTO: 88.8 FL (ref 80–99)
MONOCYTES # BLD: 0.6 K/UL (ref 0–1)
MONOCYTES NFR BLD: 7 % (ref 5–13)
NEUTS SEG # BLD: 6.2 K/UL (ref 1.8–8)
NEUTS SEG NFR BLD: 74 % (ref 32–75)
NRBC # BLD: 0 K/UL (ref 0–0.01)
NRBC BLD-RTO: 0 PER 100 WBC
PLATELET # BLD AUTO: 146 K/UL (ref 150–400)
PMV BLD AUTO: 10.1 FL (ref 8.9–12.9)
POTASSIUM SERPL-SCNC: 4.2 MMOL/L (ref 3.5–5.1)
RBC # BLD AUTO: 5.11 M/UL (ref 4.1–5.7)
SERVICE CMNT-IMP: ABNORMAL
SERVICE CMNT-IMP: NORMAL
SERVICE CMNT-IMP: NORMAL
SODIUM SERPL-SCNC: 136 MMOL/L (ref 136–145)
WBC # BLD AUTO: 8.3 K/UL (ref 4.1–11.1)

## 2024-12-03 PROCEDURE — 99221 1ST HOSP IP/OBS SF/LOW 40: CPT

## 2024-12-03 PROCEDURE — 85025 COMPLETE CBC W/AUTO DIFF WBC: CPT

## 2024-12-03 PROCEDURE — 36415 COLL VENOUS BLD VENIPUNCTURE: CPT

## 2024-12-03 PROCEDURE — 6360000002 HC RX W HCPCS: Performed by: PODIATRIST

## 2024-12-03 PROCEDURE — 82962 GLUCOSE BLOOD TEST: CPT

## 2024-12-03 PROCEDURE — 6370000000 HC RX 637 (ALT 250 FOR IP): Performed by: PODIATRIST

## 2024-12-03 PROCEDURE — 2580000003 HC RX 258: Performed by: PODIATRIST

## 2024-12-03 PROCEDURE — 2500000003 HC RX 250 WO HCPCS: Performed by: PODIATRIST

## 2024-12-03 PROCEDURE — 6370000000 HC RX 637 (ALT 250 FOR IP): Performed by: STUDENT IN AN ORGANIZED HEALTH CARE EDUCATION/TRAINING PROGRAM

## 2024-12-03 PROCEDURE — 6360000002 HC RX W HCPCS: Performed by: STUDENT IN AN ORGANIZED HEALTH CARE EDUCATION/TRAINING PROGRAM

## 2024-12-03 PROCEDURE — 99223 1ST HOSP IP/OBS HIGH 75: CPT | Performed by: INTERNAL MEDICINE

## 2024-12-03 PROCEDURE — 2580000003 HC RX 258: Performed by: ANESTHESIOLOGY

## 2024-12-03 PROCEDURE — 80048 BASIC METABOLIC PNL TOTAL CA: CPT

## 2024-12-03 PROCEDURE — 1100000003 HC PRIVATE W/ TELEMETRY

## 2024-12-03 PROCEDURE — 6370000000 HC RX 637 (ALT 250 FOR IP)

## 2024-12-03 RX ORDER — SODIUM CHLORIDE 0.9 % (FLUSH) 0.9 %
5-40 SYRINGE (ML) INJECTION EVERY 12 HOURS SCHEDULED
Status: CANCELLED | OUTPATIENT
Start: 2024-12-03

## 2024-12-03 RX ORDER — GLIPIZIDE 5 MG/1
5 TABLET ORAL
Status: DISCONTINUED | OUTPATIENT
Start: 2024-12-03 | End: 2024-12-12 | Stop reason: HOSPADM

## 2024-12-03 RX ORDER — INSULIN GLARGINE 100 [IU]/ML
50 INJECTION, SOLUTION SUBCUTANEOUS NIGHTLY
Status: DISCONTINUED | OUTPATIENT
Start: 2024-12-03 | End: 2024-12-03

## 2024-12-03 RX ORDER — SODIUM CHLORIDE 0.9 % (FLUSH) 0.9 %
5-40 SYRINGE (ML) INJECTION PRN
Status: CANCELLED | OUTPATIENT
Start: 2024-12-03

## 2024-12-03 RX ORDER — SODIUM CHLORIDE 9 MG/ML
INJECTION, SOLUTION INTRAVENOUS PRN
Status: CANCELLED | OUTPATIENT
Start: 2024-12-03

## 2024-12-03 RX ORDER — INSULIN GLARGINE 100 [IU]/ML
10 INJECTION, SOLUTION SUBCUTANEOUS
Status: COMPLETED | OUTPATIENT
Start: 2024-12-03 | End: 2024-12-03

## 2024-12-03 RX ORDER — INSULIN LISPRO 100 [IU]/ML
13 INJECTION, SOLUTION INTRAVENOUS; SUBCUTANEOUS
Status: DISCONTINUED | OUTPATIENT
Start: 2024-12-03 | End: 2024-12-04

## 2024-12-03 RX ADMIN — OXYCODONE 5 MG: 5 TABLET ORAL at 20:57

## 2024-12-03 RX ADMIN — INSULIN LISPRO 10 UNITS: 100 INJECTION, SOLUTION INTRAVENOUS; SUBCUTANEOUS at 09:50

## 2024-12-03 RX ADMIN — INSULIN LISPRO 13 UNITS: 100 INJECTION, SOLUTION INTRAVENOUS; SUBCUTANEOUS at 13:48

## 2024-12-03 RX ADMIN — VANCOMYCIN 1750 MG: 1.25 INJECTION, SOLUTION INTRAVENOUS at 15:21

## 2024-12-03 RX ADMIN — ENOXAPARIN SODIUM 30 MG: 100 INJECTION SUBCUTANEOUS at 21:01

## 2024-12-03 RX ADMIN — INSULIN LISPRO 2 UNITS: 100 INJECTION, SOLUTION INTRAVENOUS; SUBCUTANEOUS at 13:48

## 2024-12-03 RX ADMIN — PIPERACILLIN AND TAZOBACTAM 3375 MG: 3; .375 INJECTION, POWDER, LYOPHILIZED, FOR SOLUTION INTRAVENOUS at 18:45

## 2024-12-03 RX ADMIN — ASPIRIN 81 MG: 81 TABLET, CHEWABLE ORAL at 09:49

## 2024-12-03 RX ADMIN — LISINOPRIL 40 MG: 20 TABLET ORAL at 09:49

## 2024-12-03 RX ADMIN — GLIPIZIDE 5 MG: 5 TABLET ORAL at 15:57

## 2024-12-03 RX ADMIN — ATORVASTATIN CALCIUM 40 MG: 40 TABLET, FILM COATED ORAL at 21:01

## 2024-12-03 RX ADMIN — INSULIN LISPRO 13 UNITS: 100 INJECTION, SOLUTION INTRAVENOUS; SUBCUTANEOUS at 18:43

## 2024-12-03 RX ADMIN — OXYCODONE 5 MG: 5 TABLET ORAL at 15:57

## 2024-12-03 RX ADMIN — VANCOMYCIN 1750 MG: 1.25 INJECTION, SOLUTION INTRAVENOUS at 06:02

## 2024-12-03 RX ADMIN — FAMOTIDINE 20 MG: 20 TABLET, FILM COATED ORAL at 09:49

## 2024-12-03 RX ADMIN — SODIUM CHLORIDE: 9 INJECTION, SOLUTION INTRAVENOUS at 21:53

## 2024-12-03 RX ADMIN — INSULIN GLARGINE 10 UNITS: 100 INJECTION, SOLUTION SUBCUTANEOUS at 09:50

## 2024-12-03 RX ADMIN — FAMOTIDINE 20 MG: 20 TABLET, FILM COATED ORAL at 21:01

## 2024-12-03 RX ADMIN — PIPERACILLIN AND TAZOBACTAM 3375 MG: 3; .375 INJECTION, POWDER, LYOPHILIZED, FOR SOLUTION INTRAVENOUS at 09:54

## 2024-12-03 RX ADMIN — SODIUM CHLORIDE, PRESERVATIVE FREE 10 ML: 5 INJECTION INTRAVENOUS at 22:00

## 2024-12-03 RX ADMIN — OXYCODONE 5 MG: 5 TABLET ORAL at 11:42

## 2024-12-03 RX ADMIN — INSULIN HUMAN 40 UNITS: 100 INJECTION, SUSPENSION SUBCUTANEOUS at 22:04

## 2024-12-03 RX ADMIN — PIPERACILLIN AND TAZOBACTAM 3375 MG: 3; .375 INJECTION, POWDER, LYOPHILIZED, FOR SOLUTION INTRAVENOUS at 01:00

## 2024-12-03 RX ADMIN — INSULIN LISPRO 2 UNITS: 100 INJECTION, SOLUTION INTRAVENOUS; SUBCUTANEOUS at 22:05

## 2024-12-03 RX ADMIN — VANCOMYCIN 1750 MG: 1.25 INJECTION, SOLUTION INTRAVENOUS at 21:54

## 2024-12-03 RX ADMIN — ENOXAPARIN SODIUM 30 MG: 100 INJECTION SUBCUTANEOUS at 09:49

## 2024-12-03 RX ADMIN — INSULIN LISPRO 6 UNITS: 100 INJECTION, SOLUTION INTRAVENOUS; SUBCUTANEOUS at 09:49

## 2024-12-03 RX ADMIN — OXYCODONE 5 MG: 5 TABLET ORAL at 06:05

## 2024-12-03 ASSESSMENT — PAIN - FUNCTIONAL ASSESSMENT: PAIN_FUNCTIONAL_ASSESSMENT: ACTIVITIES ARE NOT PREVENTED

## 2024-12-03 ASSESSMENT — PAIN SCALES - GENERAL
PAINLEVEL_OUTOF10: 8
PAINLEVEL_OUTOF10: 0
PAINLEVEL_OUTOF10: 5
PAINLEVEL_OUTOF10: 7
PAINLEVEL_OUTOF10: 7
PAINLEVEL_OUTOF10: 6
PAINLEVEL_OUTOF10: 3
PAINLEVEL_OUTOF10: 7

## 2024-12-03 ASSESSMENT — PAIN DESCRIPTION - LOCATION
LOCATION: FOOT

## 2024-12-03 ASSESSMENT — PAIN DESCRIPTION - DESCRIPTORS
DESCRIPTORS: ACHING
DESCRIPTORS: THROBBING
DESCRIPTORS: ACHING

## 2024-12-03 ASSESSMENT — PAIN DESCRIPTION - ORIENTATION
ORIENTATION: RIGHT

## 2024-12-03 ASSESSMENT — PAIN SCALES - WONG BAKER: WONGBAKER_NUMERICALRESPONSE: NO HURT

## 2024-12-03 NOTE — PROGRESS NOTES
Hospitalist Progress Note    NAME:   Milton Hughes   : 1970   MRN: [id number]     Date/Time: 12/3/2024 9:10 AM  Patient PCP: No primary care provider on file.    Estimated discharge date: 2024  Barriers:     Pending intraoperative culture result for antibiotics finalization  Vascular clearance  Podiatry need to do another procedure  Might need infectious disease after that      Interim history 12/3/2024:   Patient was lying comfortably in the bed.  He reported improvement in right lower extremity redness and swelling.  Rest of review system negative plan of care communicated to the patient in detail and all questions were answered          Assessment / Plan:    Sepsis secondary to osteomyelitis of right foot, right lower extremity cellulitis and necrotic toe, resolved  Osteomyelitis of right foot, necrotic toe  Diabetic foot ulcer with gangrene of right fifth toe  Leukocytosis secondary to left lower extremity cellulitis and necrotic toe  Sepsis based on elevated CRP 21.30 initial presentation and leukocytosis, improved    currently on broad-spectrum antibiotic Zosyn and vancomycin,  Status post debridement amputation on 2024  Will follow-up on intraoperative cultures  Cultures are positive for Streptococcus  Pain control  Tylenol as needed  Oxycodone and Dilaudid  Vascular on the board, appreciate recommendations  CBC daily  Talk to Dr.Mahavadi Hernandes (from podiatry) -will wait for the pathology result to come back, vascular evaluation.  Based on vascular evalu) ation patient will need return to OR         Poorly controlled diabetes  - With A1c of 10  - Patient does not take any medication, has not seen his primary care doctor for many years  --Patient used 22 units sliding scale insulin in the last 24 hours  --Glargine increased to 50 units from 38 units, short-acting insulin increased from 10 units to 13 units 3 times daily  --Ordered 10 additional units of glargine for now  --Diabetic  muscle use  CV:  Regular  rhythm,  No edema  GI:  Soft, Non distended, Non tender.  +Bowel sounds  Neurologic:  Alert and oriented X 3, normal speech,   Psych:   Good insight. Not anxious nor agitated  Skin:  Redness on the right shin--improving, improvement in right lower extremity swelling.  Right foot was wrapped in Ace bandage that was dry clean and intact    Reviewed most current lab test results and cultures  YES  Reviewed most current radiology test results   YES  Review and summation of old records today    NO  Reviewed patient's current orders and MAR    YES  PMH/SH reviewed - no change compared to H&P    Procedures: see electronic medical records for all procedures/Xrays and details which were not copied into this note but were reviewed prior to creation of Plan.      LABS:  I reviewed today's most current labs and imaging studies.  Pertinent labs include:  Recent Labs     12/01/24  0600 12/02/24 0222 12/03/24 0442   WBC 13.3* 11.7* 8.3   HGB 16.2 14.3 15.7   HCT 44.9 39.4 45.4   * 122* 146*     Recent Labs     12/01/24  0600 12/02/24 0222 12/03/24  0442   * 135* 136   K 3.9 4.3 4.2    104 104   CO2 24 26 29   GLUCOSE 249* 366* 367*   BUN 14 16 23*   CREATININE 0.68* 0.75 0.83   CALCIUM 8.5 8.2* 8.5       Signed: Jenna Peoples MD

## 2024-12-03 NOTE — PROGRESS NOTES
End of Shift Note    Bedside shift change report given to ESTRELLA Wang  (oncoming nurse) by Liat Hastings RN (offgoing nurse).  Report included the following information SBAR REPORTS LIST: SBAR, ED Summary, OR Summary, Procedure Summary, Intake/Output, and Recent Results    Shift worked:  7a-7p     Shift summary and any significant changes:     Usually cooperative, but when pain meds weren't given exactly Q4, pt became irritable and called out multiple times.  Pt a/ox4 . Voiding, up ad paige to bathroom. VSS. Good appetite, tolerating but complains that the food the hospital gives him is not enough. Pain managed with PO PRN meds. OOB during shift, very independent. Bed locked and in lowest position. Call bell within reach. No questions or concerns presented at this time.       Concerns for physician to address:  Diabetic management?     Zone phone for oncoming shift:   [phone]       Activity:  Activity: Ambulate  Number times ambulated in hallways past shift: 0  Number of times OOB to chair past shift: 1    Cardiac:   Cardiac Monitoring: YES / NO: Yes        Access:  Current line(s): IV ACCESS: - Peripheral IV - site  L forearm, insertion date: 12/2/24    Genitourinary:   Urinary status: Urinary status: Patient is voiding without difficulty.    Respiratory:   oxygen delivery: room air  Chronic home O2 use?: YES / NO: No  Incentive spirometer at bedside: YES / NO: No      GI:  Current diet:  DIET: regular  Passing flatus: YES / NO: Yes  Tolerating current diet: YES / NO: Yes      Pain Management:   Patient states pain is manageable on current regimen: YES / NO: Yes    Skin:    Interventions: BETH SCALE: 19    Patient Safety:  Fall Score: FALL RISK ASSESSMENT: At risk due to:  right toe amputation  Interventions: Fall Interventions Provided: Implemented/recommended use of non-skid footwear, Implemented/recommended use of fall risk identification flag to all team members, Implemented/recommended assistive devices and  encouraged their use, Implemented/recommended resources for alarm system (personal alarm, bed alarm, call bell, etc.) , Implemented/recommended environmental changes (remove hazards, lower bed, improve lighting, etc.), and Implemented/recommended increased supervision/assistance      Length of Stay:  Expected LOS: 4  Actual LOS: 3      Liat Hastings RN

## 2024-12-03 NOTE — PLAN OF CARE
Problem: Chronic Conditions and Co-morbidities  Goal: Patient's chronic conditions and co-morbidity symptoms are monitored and maintained or improved  12/3/2024 0151 by Michelle Hurst RN  Outcome: Progressing  12/2/2024 1624 by Liat Hastings RN  Outcome: Progressing     Problem: Discharge Planning  Goal: Discharge to home or other facility with appropriate resources  12/3/2024 0151 by Michelle Hurst RN  Outcome: Progressing  12/2/2024 1624 by Liat Hastings RN  Outcome: Progressing     Problem: Pain  Goal: Verbalizes/displays adequate comfort level or baseline comfort level  12/3/2024 0151 by Michelle Hurst RN  Outcome: Progressing  12/2/2024 1624 by Liat Hastings RN  Outcome: Progressing  Flowsheets (Taken 12/2/2024 0248 by Mariah Palacios, RN)  Verbalizes/displays adequate comfort level or baseline comfort level: Encourage patient to monitor pain and request assistance     Problem: Skin/Tissue Integrity - Adult  Goal: Incisions, wounds, or drain sites healing without S/S of infection  12/3/2024 0151 by Michelle Hurst RN  Outcome: Progressing  12/2/2024 1624 by Liat Hastings RN  Outcome: Progressing     Problem: Musculoskeletal - Adult  Goal: Return mobility to safest level of function  12/3/2024 0151 by Michelle Hurst RN  Outcome: Progressing  Flowsheets (Taken 12/2/2024 2038 by Nikki Choe, RN)  Return Mobility to Safest Level of Function: Assess patient stability and activity tolerance for standing, transferring and ambulating with or without assistive devices  12/2/2024 1624 by Liat Hastings RN  Outcome: Progressing     Problem: Safety - Adult  Goal: Free from fall injury  12/3/2024 0151 by Michelle Hurst RN  Outcome: Progressing  12/2/2024 1624 by Liat Hastings RN  Outcome: Progressing

## 2024-12-03 NOTE — PROGRESS NOTES
End of Shift Note    Bedside shift change report given to ESTRELLA Veloz  (oncoming nurse) by Michelle Hurst RN (offgoing nurse).  Report included the following information SBAR REPORTS LIST: SBAR, ED Summary, OR Summary, Procedure Summary, Intake/Output, and Recent Results    Shift worked:  11pm -7am     Shift summary and any significant changes:     Antibiotics continued. No acute events this shift. Pt refused 4am vitals. Requests to sleep      Concerns for physician to address:  Diabetic management?     Zone phone for oncoming shift:   [phone]       Activity:  Activity: Ambulate  Number times ambulated in hallways past shift: 0  Number of times OOB to chair past shift: 1    Cardiac:   Cardiac Monitoring: YES / NO: Yes        Access:  Current line(s): IV ACCESS: - Peripheral IV - site  L forearm, insertion date: 12/2/24    Genitourinary:   Urinary status: Urinary status: Patient is voiding without difficulty.    Respiratory:   oxygen delivery: room air  Chronic home O2 use?: YES / NO: No  Incentive spirometer at bedside: YES / NO: No      GI:  Current diet:  DIET: regular  Passing flatus: YES / NO: Yes  Tolerating current diet: YES / NO: Yes      Pain Management:   Patient states pain is manageable on current regimen: YES / NO: Yes    Skin:    Interventions: BETH SCALE: 19    Patient Safety:  Fall Score: FALL RISK ASSESSMENT: At risk due to:  right toe amputation  Interventions: Fall Interventions Provided: Implemented/recommended use of non-skid footwear, Implemented/recommended use of fall risk identification flag to all team members, Implemented/recommended assistive devices and encouraged their use, Implemented/recommended resources for alarm system (personal alarm, bed alarm, call bell, etc.) , Implemented/recommended environmental changes (remove hazards, lower bed, improve lighting, etc.), and Implemented/recommended increased supervision/assistance      Length of Stay:  Expected LOS: 4  Actual LOS:

## 2024-12-03 NOTE — DIABETES MGMT
[facility]  DIABETES MANAGEMENT CONSULT    Consulted by Provider for advanced nursing evaluation and care for inpatient blood glucose management.    Evaluation and Action Plan   Milton Hughes is a 54 year old male patient with a hx of Type 2 DM. His current A1c is 10.3% which demonstrates uncontrolled blood sugars prior to admission. The patient admitted after experiencing fevers and right foot pain, swelling and discoloration.   The patent reports that he took Metformin in the past but did not tolerate well. He has not taking any medication for diabetes in years. The patient is amenable to using insulin at discharge. He does not have medical insurance, so Relion bran NPH discussed. The patient reports that he could afford the 25-26 dollars per vial. Insulin switched to NPH and dosing adjusted.     Blood glucose pattern          Management Rationale Action Plan   Medication   Basal needs Using 0.5 units/kg/D based on weight  Use 25 units NPH Q 12 hours   Nutritional needs Covers carb load in meals Continue scheduled meal time  insulindose   Corrective insulin Using medium dose sensitivity based on weight    Additional orders  Carb consistent diet (60g CHO/meal)  Add glipizide with breakfast and dinner       Diabetes Discharge Plan   Medication  TBD   Referral  [x]        Outpatient diabetes education   Additional orders            Initial Presentation   Milton Hughes is a 54 y.o. male who presented to the ED on   LAB: Glucose 404. A1c 10.3 WBC 12.4  Narrative & Impression  EXAM: XR FOOT RIGHT (MIN 3 VIEWS)     INDICATION: toe discoloration, foot pain.     COMPARISON: None.     FINDINGS: Three views of the right foot demonstrate no fracture or other acute  osseous or articular abnormality. The soft tissues are within normal limits.     IMPRESSION:  No acute abnormality.    HX:   Past Medical History:   Diagnosis Date    Asthma     DM (diabetes mellitus) ([facility]) 02/29/2016    Other  care recommendations, I personally reviewed the hospitalization record, including notes, laboratory & diagnostic data and current medications, and examined the patient at the bedside.  Total minutes: 40 minutes    JORDANA Norman   Diabetes Clinical Nurse Specialist and Board Certified Advanced Diabetes Manager  [facility]  Access via [employer]

## 2024-12-03 NOTE — CONSULTS
Basophils Absolute 0.0 0.0 - 0.1 K/UL    Immature Granulocytes Absolute 0.2 (H) 0.00 - 0.04 K/UL    Differential Type AUTOMATED     POCT Glucose    Collection Time: 12/02/24  6:14 AM   Result Value Ref Range    POC Glucose 328 (H) 65 - 117 mg/dL    Performed by: Terry Whelan PCT    POCT Glucose    Collection Time: 12/02/24 11:34 AM   Result Value Ref Range    POC Glucose 329 (H) 65 - 117 mg/dL    Performed by: Olman Hernandes PCT    POCT Glucose    Collection Time: 12/02/24  5:57 PM   Result Value Ref Range    POC Glucose 287 (H) 65 - 117 mg/dL    Performed by: Olman Hernandes PCT    POCT Glucose    Collection Time: 12/02/24  9:39 PM   Result Value Ref Range    POC Glucose 346 (H) 65 - 117 mg/dL    Performed by: Zahida Lopes PCT        Results       Procedure Component Value Units Date/Time    Culture, Wound [[id number]]  (Abnormal) Collected: 12/01/24 0923    Order Status: Completed Specimen: Foot, Right Updated: 12/02/24 0934     Special Requests NO SPECIAL REQUESTS        Gram Stain NO WBC'S SEEN         NO ORGANISMS SEEN        Culture       MODERATE STREPTOCOCCI, BETA HEMOLYTIC GROUP B Penicillin and ampicillin are drugs of choice for treatment of beta-hemolytic streptococcal infections. Susceptibility testing of penicillins and beta-lactams approved by the FDA for treatment of beta-hemolytic streptococcal infections need not be performed routinely, because nonsusceptible isolates are extremely rare. CLSI 2012              Checking for possible other organisms          Culture, Anaerobic [[id number]] Collected: 12/01/24 0918    Order Status: Sent Specimen: Foot, Right Updated: 12/01/24 1323    Culture, Anaerobic [[id number]] Collected: 12/01/24 0918    Order Status: Sent Specimen: Foot, Right Updated: 12/01/24 1323    Culture, Wound [[id number]]  (Abnormal) Collected: 12/01/24 0918    Order Status: Completed Specimen: Foot, Right Updated: 12/02/24 0942     Special Requests NO SPECIAL REQUESTS        Gram  Stain RARE WBCS SEEN         NO DEFINITE ORGANISM SEEN        Culture       HEAVY STREPTOCOCCI, BETA HEMOLYTIC GROUP B Penicillin and ampicillin are drugs of choice for treatment of beta-hemolytic streptococcal infections. Susceptibility testing of penicillins and beta-lactams approved by the FDA for treatment of beta-hemolytic streptococcal infections need not be performed routinely, because nonsusceptible isolates are extremely rare. CLSI 2012              Checking for possible other organisms          Blood Culture 1 [[id number]] Collected: 11/29/24 0901    Order Status: Completed Specimen: Blood Updated: 12/02/24 1157     Special Requests NO SPECIAL REQUESTS        Culture NO GROWTH 3 DAYS       Blood Culture 2 [[id number]] Collected: 11/29/24 0901    Order Status: Completed Specimen: Blood Updated: 12/02/24 1158     Special Requests NO SPECIAL REQUESTS        Culture NO GROWTH 3 DAYS               Xray Result (most recent):  XR FOOT RIGHT (MIN 3 VIEWS) 11/29/2024    Narrative  EXAM: XR FOOT RIGHT (MIN 3 VIEWS)    INDICATION: toe discoloration, foot pain.    COMPARISON: None.    FINDINGS: Three views of the right foot demonstrate no fracture or other acute  osseous or articular abnormality. The soft tissues are within normal limits.    Impression  No acute abnormality.    Electronically signed by Tru Arevalo      MRI FOOT RIGHT WO CONTRAST    Result Date: 11/30/2024  INDICATION: Cellulitis of the right foot Exam: MRI right foot without contrast Sequences include short axis and long axis T1, short axis, long axis and sagittal T2 with fat saturation. Short axis TI with fat saturation. Comparisons: Prior day FINDINGS: Alignment is normal. There is no cortical destruction or edema to suggest osteomyelitis. Lisfranc ligament is intact. Metatarsal phalangeal joint capsules are within normal limits. There is diffuse increased T2 signal within the muscles of the foot nonspecific but can be seen with denervation but

## 2024-12-04 ENCOUNTER — ANESTHESIA (OUTPATIENT)
Facility: HOSPITAL | Age: 54
End: 2024-12-04
Payer: MEDICAID

## 2024-12-04 PROBLEM — E11.621 DIABETIC ULCER OF RIGHT MIDFOOT ASSOCIATED WITH TYPE 2 DIABETES MELLITUS, WITH NECROSIS OF BONE (HCC): Status: ACTIVE | Noted: 2024-12-04

## 2024-12-04 PROBLEM — L97.414 DIABETIC ULCER OF RIGHT MIDFOOT ASSOCIATED WITH TYPE 2 DIABETES MELLITUS, WITH NECROSIS OF BONE (HCC): Status: ACTIVE | Noted: 2024-12-04

## 2024-12-04 PROBLEM — E66.811 OBESITY (BMI 30.0-34.9): Status: ACTIVE | Noted: 2024-12-04

## 2024-12-04 PROBLEM — E78.5 HYPERLIPIDEMIA: Status: ACTIVE | Noted: 2024-12-04

## 2024-12-04 PROBLEM — I10 PRIMARY HYPERTENSION: Status: ACTIVE | Noted: 2024-12-04

## 2024-12-04 PROBLEM — L03.115 CELLULITIS OF RIGHT LOWER EXTREMITY: Status: ACTIVE | Noted: 2024-12-04

## 2024-12-04 PROBLEM — M86.9 OSTEOMYELITIS OF FIFTH TOE OF RIGHT FOOT: Status: ACTIVE | Noted: 2024-12-04

## 2024-12-04 PROBLEM — I96 GANGRENE OF RIGHT FOOT (HCC): Status: ACTIVE | Noted: 2024-12-04

## 2024-12-04 PROBLEM — A49.01 MSSA (METHICILLIN SUSCEPTIBLE STAPHYLOCOCCUS AUREUS) INFECTION: Status: ACTIVE | Noted: 2024-12-04

## 2024-12-04 PROBLEM — A49.1 STREPTOCOCCAL INFECTION: Status: ACTIVE | Noted: 2024-12-04

## 2024-12-04 LAB
ANION GAP SERPL CALC-SCNC: 6 MMOL/L (ref 2–12)
BACTERIA SPEC CULT: ABNORMAL
BACTERIA SPEC CULT: NORMAL
BACTERIA SPEC CULT: NORMAL
BASOPHILS # BLD: 0.1 K/UL (ref 0–0.1)
BASOPHILS NFR BLD: 1 % (ref 0–1)
BUN SERPL-MCNC: 14 MG/DL (ref 6–20)
BUN/CREAT SERPL: 24 (ref 12–20)
CALCIUM SERPL-MCNC: 7.9 MG/DL (ref 8.5–10.1)
CHLORIDE SERPL-SCNC: 105 MMOL/L (ref 97–108)
CO2 SERPL-SCNC: 25 MMOL/L (ref 21–32)
CREAT SERPL-MCNC: 0.58 MG/DL (ref 0.7–1.3)
DIFFERENTIAL METHOD BLD: ABNORMAL
EOSINOPHIL # BLD: 0.3 K/UL (ref 0–0.4)
EOSINOPHIL NFR BLD: 4 % (ref 0–7)
ERYTHROCYTE [DISTWIDTH] IN BLOOD BY AUTOMATED COUNT: 12 % (ref 11.5–14.5)
GLUCOSE BLD STRIP.AUTO-MCNC: 117 MG/DL (ref 65–117)
GLUCOSE BLD STRIP.AUTO-MCNC: 134 MG/DL (ref 65–117)
GLUCOSE BLD STRIP.AUTO-MCNC: 135 MG/DL (ref 65–117)
GLUCOSE BLD STRIP.AUTO-MCNC: 136 MG/DL (ref 65–117)
GLUCOSE BLD STRIP.AUTO-MCNC: 185 MG/DL (ref 65–117)
GLUCOSE SERPL-MCNC: 123 MG/DL (ref 65–100)
GRAM STN SPEC: ABNORMAL
HCT VFR BLD AUTO: 38.1 % (ref 36.6–50.3)
HGB BLD-MCNC: 13.6 G/DL (ref 12.1–17)
IMM GRANULOCYTES # BLD AUTO: 0.1 K/UL (ref 0–0.04)
IMM GRANULOCYTES NFR BLD AUTO: 1 % (ref 0–0.5)
LYMPHOCYTES # BLD: 1 K/UL (ref 0.8–3.5)
LYMPHOCYTES NFR BLD: 14 % (ref 12–49)
MCH RBC QN AUTO: 31.3 PG (ref 26–34)
MCHC RBC AUTO-ENTMCNC: 35.7 G/DL (ref 30–36.5)
MCV RBC AUTO: 87.8 FL (ref 80–99)
MONOCYTES # BLD: 0.6 K/UL (ref 0–1)
MONOCYTES NFR BLD: 8 % (ref 5–13)
NEUTS SEG # BLD: 5.5 K/UL (ref 1.8–8)
NEUTS SEG NFR BLD: 73 % (ref 32–75)
NRBC # BLD: 0 K/UL (ref 0–0.01)
NRBC BLD-RTO: 0 PER 100 WBC
PLATELET # BLD AUTO: 152 K/UL (ref 150–400)
PMV BLD AUTO: 9.7 FL (ref 8.9–12.9)
POTASSIUM SERPL-SCNC: 3.6 MMOL/L (ref 3.5–5.1)
RBC # BLD AUTO: 4.34 M/UL (ref 4.1–5.7)
SERVICE CMNT-IMP: ABNORMAL
SERVICE CMNT-IMP: NORMAL
SODIUM SERPL-SCNC: 136 MMOL/L (ref 136–145)
VAS LEFT ABI: 1.18
VAS LEFT ARM BP: 139 MMHG
VAS LEFT DORSALIS PEDIS BP: 160 MMHG
VAS LEFT PTA BP: 164 MMHG
VAS LEFT TBI: 1.01
VAS LEFT TOE PRESSURE: 141 MMHG
VAS RIGHT ABI: 1.12
VAS RIGHT DORSALIS PEDIS BP: 150 MMHG
VAS RIGHT PTA BP: 156 MMHG
VAS RIGHT TBI: 0.88
VAS RIGHT TOE PRESSURE: 122 MMHG
WBC # BLD AUTO: 7.5 K/UL (ref 4.1–11.1)

## 2024-12-04 PROCEDURE — 80048 BASIC METABOLIC PNL TOTAL CA: CPT

## 2024-12-04 PROCEDURE — 6360000002 HC RX W HCPCS: Performed by: PODIATRIST

## 2024-12-04 PROCEDURE — 6360000002 HC RX W HCPCS: Performed by: STUDENT IN AN ORGANIZED HEALTH CARE EDUCATION/TRAINING PROGRAM

## 2024-12-04 PROCEDURE — 99232 SBSQ HOSP IP/OBS MODERATE 35: CPT

## 2024-12-04 PROCEDURE — 6370000000 HC RX 637 (ALT 250 FOR IP): Performed by: PODIATRIST

## 2024-12-04 PROCEDURE — 2580000003 HC RX 258

## 2024-12-04 PROCEDURE — 7100000000 HC PACU RECOVERY - FIRST 15 MIN: Performed by: PODIATRIST

## 2024-12-04 PROCEDURE — 88307 TISSUE EXAM BY PATHOLOGIST: CPT

## 2024-12-04 PROCEDURE — 82962 GLUCOSE BLOOD TEST: CPT

## 2024-12-04 PROCEDURE — 0HRMXK3 REPLACEMENT OF RIGHT FOOT SKIN WITH NONAUTOLOGOUS TISSUE SUBSTITUTE, FULL THICKNESS, EXTERNAL APPROACH: ICD-10-PCS | Performed by: PODIATRIST

## 2024-12-04 PROCEDURE — 85025 COMPLETE CBC W/AUTO DIFF WBC: CPT

## 2024-12-04 PROCEDURE — 36415 COLL VENOUS BLD VENIPUNCTURE: CPT

## 2024-12-04 PROCEDURE — 99233 SBSQ HOSP IP/OBS HIGH 50: CPT | Performed by: INTERNAL MEDICINE

## 2024-12-04 PROCEDURE — 3700000000 HC ANESTHESIA ATTENDED CARE: Performed by: PODIATRIST

## 2024-12-04 PROCEDURE — 2580000003 HC RX 258: Performed by: PODIATRIST

## 2024-12-04 PROCEDURE — 3600000012 HC SURGERY LEVEL 2 ADDTL 15MIN: Performed by: PODIATRIST

## 2024-12-04 PROCEDURE — 6360000002 HC RX W HCPCS

## 2024-12-04 PROCEDURE — 87205 SMEAR GRAM STAIN: CPT

## 2024-12-04 PROCEDURE — 2709999900 HC NON-CHARGEABLE SUPPLY: Performed by: PODIATRIST

## 2024-12-04 PROCEDURE — 87070 CULTURE OTHR SPECIMN AEROBIC: CPT

## 2024-12-04 PROCEDURE — 6370000000 HC RX 637 (ALT 250 FOR IP)

## 2024-12-04 PROCEDURE — 2720000010 HC SURG SUPPLY STERILE: Performed by: PODIATRIST

## 2024-12-04 PROCEDURE — 3700000001 HC ADD 15 MINUTES (ANESTHESIA): Performed by: PODIATRIST

## 2024-12-04 PROCEDURE — 2580000003 HC RX 258: Performed by: ANESTHESIOLOGY

## 2024-12-04 PROCEDURE — 3600000002 HC SURGERY LEVEL 2 BASE: Performed by: PODIATRIST

## 2024-12-04 PROCEDURE — 88311 DECALCIFY TISSUE: CPT

## 2024-12-04 PROCEDURE — 7100000001 HC PACU RECOVERY - ADDTL 15 MIN: Performed by: PODIATRIST

## 2024-12-04 PROCEDURE — 1100000003 HC PRIVATE W/ TELEMETRY

## 2024-12-04 PROCEDURE — 87147 CULTURE TYPE IMMUNOLOGIC: CPT

## 2024-12-04 PROCEDURE — 87075 CULTR BACTERIA EXCEPT BLOOD: CPT

## 2024-12-04 DEVICE — GRAFT STRAVIX PL AMNIOTIC MEMBRANE 3CM X 6CM: Type: IMPLANTABLE DEVICE | Site: FOOT | Status: FUNCTIONAL

## 2024-12-04 DEVICE — THERASKIN LG 39SQ CM 5.1X7.6CM: Type: IMPLANTABLE DEVICE | Site: FOOT | Status: FUNCTIONAL

## 2024-12-04 RX ORDER — SODIUM CHLORIDE 0.9 % (FLUSH) 0.9 %
5-40 SYRINGE (ML) INJECTION EVERY 12 HOURS SCHEDULED
Status: DISCONTINUED | OUTPATIENT
Start: 2024-12-04 | End: 2024-12-04 | Stop reason: HOSPADM

## 2024-12-04 RX ORDER — LIDOCAINE HYDROCHLORIDE 20 MG/ML
INJECTION, SOLUTION EPIDURAL; INFILTRATION; INTRACAUDAL; PERINEURAL
Status: DISCONTINUED | OUTPATIENT
Start: 2024-12-04 | End: 2024-12-04 | Stop reason: SDUPTHER

## 2024-12-04 RX ORDER — ENOXAPARIN SODIUM 100 MG/ML
30 INJECTION SUBCUTANEOUS 2 TIMES DAILY
Status: CANCELLED | OUTPATIENT
Start: 2024-12-04

## 2024-12-04 RX ORDER — HYDROMORPHONE HYDROCHLORIDE 1 MG/ML
0.25 INJECTION, SOLUTION INTRAMUSCULAR; INTRAVENOUS; SUBCUTANEOUS EVERY 5 MIN PRN
Status: DISCONTINUED | OUTPATIENT
Start: 2024-12-04 | End: 2024-12-04 | Stop reason: HOSPADM

## 2024-12-04 RX ORDER — ONDANSETRON 2 MG/ML
INJECTION INTRAMUSCULAR; INTRAVENOUS
Status: DISCONTINUED | OUTPATIENT
Start: 2024-12-04 | End: 2024-12-04 | Stop reason: SDUPTHER

## 2024-12-04 RX ORDER — PROCHLORPERAZINE EDISYLATE 5 MG/ML
5 INJECTION INTRAMUSCULAR; INTRAVENOUS
Status: DISCONTINUED | OUTPATIENT
Start: 2024-12-04 | End: 2024-12-04 | Stop reason: HOSPADM

## 2024-12-04 RX ORDER — PHENYLEPHRINE HCL IN 0.9% NACL 0.4MG/10ML
SYRINGE (ML) INTRAVENOUS
Status: DISCONTINUED | OUTPATIENT
Start: 2024-12-04 | End: 2024-12-04 | Stop reason: SDUPTHER

## 2024-12-04 RX ORDER — SODIUM CHLORIDE 9 MG/ML
INJECTION, SOLUTION INTRAVENOUS PRN
Status: DISCONTINUED | OUTPATIENT
Start: 2024-12-04 | End: 2024-12-04 | Stop reason: HOSPADM

## 2024-12-04 RX ORDER — CLINDAMYCIN PHOSPHATE 600 MG/50ML
600 INJECTION, SOLUTION INTRAVENOUS EVERY 8 HOURS
Status: DISCONTINUED | OUTPATIENT
Start: 2024-12-05 | End: 2024-12-06

## 2024-12-04 RX ORDER — NALOXONE HYDROCHLORIDE 0.4 MG/ML
INJECTION, SOLUTION INTRAMUSCULAR; INTRAVENOUS; SUBCUTANEOUS PRN
Status: DISCONTINUED | OUTPATIENT
Start: 2024-12-04 | End: 2024-12-04 | Stop reason: HOSPADM

## 2024-12-04 RX ORDER — FENTANYL CITRATE 50 UG/ML
50 INJECTION, SOLUTION INTRAMUSCULAR; INTRAVENOUS EVERY 5 MIN PRN
Status: DISCONTINUED | OUTPATIENT
Start: 2024-12-04 | End: 2024-12-04 | Stop reason: HOSPADM

## 2024-12-04 RX ORDER — INSULIN LISPRO 100 [IU]/ML
4 INJECTION, SOLUTION INTRAVENOUS; SUBCUTANEOUS
Status: DISCONTINUED | OUTPATIENT
Start: 2024-12-04 | End: 2024-12-04

## 2024-12-04 RX ORDER — SODIUM CHLORIDE 0.9 % (FLUSH) 0.9 %
5-40 SYRINGE (ML) INJECTION PRN
Status: DISCONTINUED | OUTPATIENT
Start: 2024-12-04 | End: 2024-12-04 | Stop reason: HOSPADM

## 2024-12-04 RX ORDER — MIDAZOLAM HYDROCHLORIDE 1 MG/ML
INJECTION, SOLUTION INTRAMUSCULAR; INTRAVENOUS
Status: DISCONTINUED | OUTPATIENT
Start: 2024-12-04 | End: 2024-12-04 | Stop reason: SDUPTHER

## 2024-12-04 RX ORDER — BUPIVACAINE HYDROCHLORIDE 5 MG/ML
INJECTION, SOLUTION PERINEURAL PRN
Status: DISCONTINUED | OUTPATIENT
Start: 2024-12-04 | End: 2024-12-04 | Stop reason: HOSPADM

## 2024-12-04 RX ORDER — SODIUM CHLORIDE, SODIUM LACTATE, POTASSIUM CHLORIDE, CALCIUM CHLORIDE 600; 310; 30; 20 MG/100ML; MG/100ML; MG/100ML; MG/100ML
INJECTION, SOLUTION INTRAVENOUS
Status: DISCONTINUED | OUTPATIENT
Start: 2024-12-04 | End: 2024-12-04 | Stop reason: SDUPTHER

## 2024-12-04 RX ADMIN — ATORVASTATIN CALCIUM 40 MG: 40 TABLET, FILM COATED ORAL at 21:33

## 2024-12-04 RX ADMIN — VANCOMYCIN 1750 MG: 1.25 INJECTION, SOLUTION INTRAVENOUS at 21:47

## 2024-12-04 RX ADMIN — PIPERACILLIN AND TAZOBACTAM 3375 MG: 3; .375 INJECTION, POWDER, LYOPHILIZED, FOR SOLUTION INTRAVENOUS at 09:32

## 2024-12-04 RX ADMIN — Medication 80 MCG: at 20:11

## 2024-12-04 RX ADMIN — OXYCODONE 5 MG: 5 TABLET ORAL at 13:43

## 2024-12-04 RX ADMIN — INSULIN HUMAN 25 UNITS: 100 INJECTION, SUSPENSION SUBCUTANEOUS at 08:38

## 2024-12-04 RX ADMIN — MIDAZOLAM HYDROCHLORIDE 2 MG: 1 INJECTION, SOLUTION INTRAMUSCULAR; INTRAVENOUS at 19:26

## 2024-12-04 RX ADMIN — SODIUM CHLORIDE, PRESERVATIVE FREE 10 ML: 5 INJECTION INTRAVENOUS at 21:37

## 2024-12-04 RX ADMIN — SODIUM CHLORIDE: 9 INJECTION, SOLUTION INTRAVENOUS at 06:45

## 2024-12-04 RX ADMIN — INSULIN LISPRO 2 UNITS: 100 INJECTION, SOLUTION INTRAVENOUS; SUBCUTANEOUS at 12:46

## 2024-12-04 RX ADMIN — SODIUM CHLORIDE, POTASSIUM CHLORIDE, SODIUM LACTATE AND CALCIUM CHLORIDE: 600; 310; 30; 20 INJECTION, SOLUTION INTRAVENOUS at 19:26

## 2024-12-04 RX ADMIN — Medication 80 MCG: at 20:17

## 2024-12-04 RX ADMIN — PIPERACILLIN AND TAZOBACTAM 3375 MG: 3; .375 INJECTION, POWDER, LYOPHILIZED, FOR SOLUTION INTRAVENOUS at 02:53

## 2024-12-04 RX ADMIN — SODIUM CHLORIDE, PRESERVATIVE FREE 10 ML: 5 INJECTION INTRAVENOUS at 21:33

## 2024-12-04 RX ADMIN — GLIPIZIDE 5 MG: 5 TABLET ORAL at 06:49

## 2024-12-04 RX ADMIN — LIDOCAINE HYDROCHLORIDE 50 MG: 20 INJECTION, SOLUTION EPIDURAL; INFILTRATION; INTRACAUDAL; PERINEURAL at 19:32

## 2024-12-04 RX ADMIN — Medication 80 MCG: at 20:21

## 2024-12-04 RX ADMIN — OXYCODONE 5 MG: 5 TABLET ORAL at 21:33

## 2024-12-04 RX ADMIN — VANCOMYCIN 1750 MG: 1.25 INJECTION, SOLUTION INTRAVENOUS at 13:48

## 2024-12-04 RX ADMIN — INSULIN HUMAN 25 UNITS: 100 INJECTION, SUSPENSION SUBCUTANEOUS at 21:35

## 2024-12-04 RX ADMIN — LISINOPRIL 40 MG: 20 TABLET ORAL at 08:37

## 2024-12-04 RX ADMIN — OXYCODONE 5 MG: 5 TABLET ORAL at 05:15

## 2024-12-04 RX ADMIN — PIPERACILLIN AND TAZOBACTAM 3375 MG: 3; .375 INJECTION, POWDER, LYOPHILIZED, FOR SOLUTION INTRAVENOUS at 17:47

## 2024-12-04 RX ADMIN — OXYCODONE 5 MG: 5 TABLET ORAL at 09:30

## 2024-12-04 RX ADMIN — ONDANSETRON HYDROCHLORIDE 4 MG: 2 INJECTION, SOLUTION INTRAMUSCULAR; INTRAVENOUS at 20:29

## 2024-12-04 RX ADMIN — Medication 40 MCG: at 20:36

## 2024-12-04 RX ADMIN — VANCOMYCIN 1750 MG: 1.25 INJECTION, SOLUTION INTRAVENOUS at 06:45

## 2024-12-04 RX ADMIN — FAMOTIDINE 20 MG: 20 TABLET, FILM COATED ORAL at 08:37

## 2024-12-04 RX ADMIN — FAMOTIDINE 20 MG: 20 TABLET, FILM COATED ORAL at 21:33

## 2024-12-04 RX ADMIN — Medication 40 MCG: at 19:53

## 2024-12-04 RX ADMIN — Medication 40 MCG: at 20:02

## 2024-12-04 RX ADMIN — OXYCODONE 5 MG: 5 TABLET ORAL at 01:10

## 2024-12-04 RX ADMIN — PROPOFOL 125 MCG/KG/MIN: 10 INJECTION, EMULSION INTRAVENOUS at 19:32

## 2024-12-04 ASSESSMENT — PAIN SCALES - GENERAL
PAINLEVEL_OUTOF10: 8
PAINLEVEL_OUTOF10: 1
PAINLEVEL_OUTOF10: 5
PAINLEVEL_OUTOF10: 5
PAINLEVEL_OUTOF10: 7
PAINLEVEL_OUTOF10: 7
PAINLEVEL_OUTOF10: 5
PAINLEVEL_OUTOF10: 7

## 2024-12-04 ASSESSMENT — PAIN DESCRIPTION - LOCATION
LOCATION: FOOT

## 2024-12-04 ASSESSMENT — PAIN SCALES - WONG BAKER: WONGBAKER_NUMERICALRESPONSE: HURTS A LITTLE BIT

## 2024-12-04 ASSESSMENT — PAIN DESCRIPTION - DESCRIPTORS
DESCRIPTORS: ACHING
DESCRIPTORS: THROBBING
DESCRIPTORS: SHARP;ACHING
DESCRIPTORS: ACHING
DESCRIPTORS: ACHING

## 2024-12-04 ASSESSMENT — PAIN DESCRIPTION - ORIENTATION
ORIENTATION: RIGHT

## 2024-12-04 NOTE — CARE COORDINATION
CM  note:    See previous CM note at 10:23 am today - CM reviewed chart patient is medicaid pending as of 12/02/2024 - [employer] has submitted a medicaid application to [facility] electronically with T# [id number] process will take minimum of 45 days for determination.  CM staff will continue to follow for disposition needs once identified.  NAZIA CUNNINGHAM, MSW  [phone]

## 2024-12-04 NOTE — PROGRESS NOTES
Bedside shift change report given to ESTRELLA Segura (oncoming nurse) by ESTRELLA Wang (offgoing nurse). Report included the following information Nurse Handoff Report, ED SBAR, Adult Overview, Intake/Output, MAR, and Recent Results.

## 2024-12-04 NOTE — PROGRESS NOTES
Spiritual Care Partner Volunteer visited patient at [facility] in [facility] 3 SURG TELE on 12/4/2024   Documented by:  Emma Michel, MPS, BCC, Staff   [facility]     Paging Service [phone])

## 2024-12-04 NOTE — PROGRESS NOTES
Hospitalist Progress Note    NAME:   Mitlon Hughes   : 1970   MRN: [id number]     Date/Time: 2024 2:11 PM  Patient PCP: No primary care provider on file.    Estimated discharge date:   Barriers: OR, +/- cultures and pathology    Assessment / Plan:    Sepsis secondary to osteomyelitis of right foot, right lower extremity cellulitis and necrotic toe, resolved  Osteomyelitis of right foot, necrotic toe  Diabetic foot ulcer with gangrene of right fifth toe  Leukocytosis secondary to left lower extremity cellulitis and necrotic toe  Sepsis based on elevated CRP 21.30 initial presentation and leukocytosis, improved    Continue broad-spectrum antibiotic Zosyn and vancomycin  Status post debridement amputation on 2024  Will follow-up on intraoperative cultures  Cultures from  are positive for Streptococcus  Pain control  Tylenol as needed  Oxycodone and Dilaudid  Vascular surgery cleared, excellent toe pressures-recommended continued care with podiatry.  CBC daily  Podiatry on board, expertise appreciated, n.p.o. for OR     Poorly controlled diabetes  - With A1c of 10  - Patient does not take any medication, has not seen his primary care doctor for many years  --Diabetic management consult placed  -- continue sliding scale insulin  -Continue NPH 25 units every 12 hours as recommended by diabetes management-sent to patient's pharmacy to see if this is a more affordable option for him    Hyperlipidemia  - LDL of 93, given patient is high risk with uncontrolled diabetes and LDL needs to be less than 70 started on Lipitor 40 mg daily     Hypertension  - Given patient is diabetic and renal protective effect of ACE inhibitors,  stopped amlodipine and started on lisinopril 40 mg daily  Given patient is high risk for coronary artery disease recommend baby aspirin 81 mg daily    Medical Decision Making:   I personally reviewed labs: CBC, BMP  I personally reviewed imaging:   I personally reviewed

## 2024-12-04 NOTE — PROGRESS NOTES
Infectious Disease Progress      Impression    Cellulitis of right lower extremity and foot  Diabetic right foot ulcer  Gangrene, acute OM of right  5th toe  MRI + for diffuse increased T2 signal within muscles of foot  Nonspecific, can also be seen with myositis.  Dorsal subcutaneous edema  No drainable fluid collection  S/p R5th toe amputation & I&DR/foot 12/1  Findings of deep infection, focal tissue necrosis  IntraOp culture from + for heavy group B strep beta-hemolytic, MSSA, few alpha strep  Pathology +for Cutaneous ulceration& associated underlying soft tissue necrosis.  Soft tissue necrosis and inflammation focally present on soft tissue margin.  Plan is for repeat surgery today        Poorly controlled diabetes  A1c 10  After medication PTA    Normal resting TORIE  Right TBI 0.88, left 1.01  S/p evaluation by vascular    Hyperlipidemia  Started on statin    Hypertension  Started on ASA, lisinopril    Obesity  BMI 31.83    ESR 12  CRP 21.3    Plan    Continue  Zosyn IV, add Clindamycin, DC Vancomycin  Adequate fluids, daily probiotic  Adequate blood sugar control  Await final cultures  Repeat cultures and pathology  ID service to follow & provide final recommendations.    Abx  Vancomycin-11/29-  Zosyn-11/29-        Temp  11/29-99.9  11/30- 100.2    Extensive review of chart notes, labs, imaging, cultures done  Additionally review of done: Recent reports-Labs, cultures, imaging  D/w -hospitalist, RN    Patient is seen for right foot infection  Milton Hughes is seen for right foot infection.he is a 54 y.o.  male with PMHx significant for diabetes, HTN, not on any medications who presented with complaints of fever and foot discoloration.  Patient reported that he started having fevers and chills for past 3 days  , also noticed discoloration of his foot when attempting to put his shoe on, reports that his foot was normal 2 days prior, but has turned red after walking.  No history of trauma or falls.  Patient is  K/UL    Monocytes Absolute 0.6 0.0 - 1.0 K/UL    Eosinophils Absolute 0.3 0.0 - 0.4 K/UL    Basophils Absolute 0.1 0.0 - 0.1 K/UL    Immature Granulocytes Absolute 0.1 (H) 0.00 - 0.04 K/UL    Differential Type AUTOMATED     POCT Glucose    Collection Time: 12/04/24  6:37 AM   Result Value Ref Range    POC Glucose 117 65 - 117 mg/dL    Performed by: Ayesha Lopez (PCT)    POCT Glucose    Collection Time: 12/04/24 12:14 PM   Result Value Ref Range    POC Glucose 185 (H) 65 - 117 mg/dL    Performed by: Sammy Kenney RN    POCT Glucose    Collection Time: 12/04/24  5:49 PM   Result Value Ref Range    POC Glucose 135 (H) 65 - 117 mg/dL    Performed by: GURINDER Segura RN        Results       Procedure Component Value Units Date/Time    Culture, Wound [[id number]]  (Abnormal)  (Susceptibility) Collected: 12/01/24 0923    Order Status: Completed Specimen: Foot, Right Updated: 12/04/24 0652     Special Requests NO SPECIAL REQUESTS        Gram Stain NO WBC'S SEEN         NO ORGANISMS SEEN        Culture       MODERATE STREPTOCOCCI, BETA HEMOLYTIC GROUP B Penicillin and ampicillin are drugs of choice for treatment of beta-hemolytic streptococcal infections. Susceptibility testing of penicillins and beta-lactams approved by the FDA for treatment of beta-hemolytic streptococcal infections need not be performed routinely, because nonsusceptible isolates are extremely rare. CLSI 2012              LIGHT Staphylococcus aureus            FEW ALPHA STREPTOCOCCUS       Susceptibility        Staphylococcus aureus      BACTERIAL SUSCEPTIBILITY PANEL WILLIAM      ciprofloxacin <=0.5 ug/mL Sensitive      clindamycin 0.25 ug/mL Sensitive      DAPTOmycin 0.25 ug/mL Sensitive      doxycycline <=0.5 ug/mL Sensitive      erythromycin <=0.25 ug/mL Sensitive      gentamicin <=0.5 ug/mL Sensitive      levofloxacin <=0.12 ug/mL Sensitive      linezolid 2 ug/mL Sensitive      moxifloxacin <=0.25 ug/mL Sensitive      oxacillin <=0.25 ug/mL Sensitive

## 2024-12-04 NOTE — CARE COORDINATION
Transition of Care Plan:    RUR: 9%  Prior Level of Functioning:   Disposition: Home w/family  LISA:   If SNF or IPR: Date FOC offered:   Date FOC received:   Accepting facility:   Date authorization started with reference number:   Date authorization received and expires:   Follow up appointments:   DME needed: n/a  Transportation at discharge: significant other  IM/IMM Medicare/ letter given: n/a  Is patient a [place] and connected with [facility]?    If yes, was  transfer form completed and [facility] notified?   Caregiver Contact: Lou Vallecillo [phone]  Discharge Caregiver contacted prior to discharge?   Care Conference needed?   Barriers to discharge:     Patient is expected to have surgery today.  Will need podiatry clearance.  Patient may need services at d/c & is self pay w/Medicaid pending.   Darya is following this pt w/CM.  CM will continue to follow.    Kristen Hardwick  Ext [phone]

## 2024-12-04 NOTE — DIABETES MGMT
[facility]  DIABETES MANAGEMENT CONSULT    Consulted by Provider for advanced nursing evaluation and care for inpatient blood glucose management.    Evaluation and Action Plan   Milton Hughes is a 54 year old male patient with a hx of Type 2 DM. His current A1c is 10.3% which demonstrates uncontrolled blood sugars prior to admission. The patient admitted after experiencing fevers and right foot pain, swelling and discoloration.   The patent reports that he took Metformin in the past but did not tolerate well. He has not taking any medication for diabetes in years. The patient is amenable to using insulin at discharge. He does not have medical insurance, so Relion bran NPH discussed. The patient reports that he could afford the 25-26 dollars per vial. Insulin switched to NPH and dosing adjusted.   Bg's are much improved. Insulin pen administration with fake skin model demonstrated. Also vial and insulin syringe administration demonstrated. Survival skills discussed. Handouts provided.     Blood glucose pattern          Management Rationale Action Plan   Medication   Basal needs Using 0.5 units/kg/D based on weight  Use 25 units NPH Q 12 hours   Corrective insulin Using medium dose sensitivity based on weight    Additional orders  Carb consistent diet (60g CHO/meal)  Add glipizide with breakfast and dinner       Diabetes Discharge Plan   Medication  TBD   Referral  [x]        Outpatient diabetes education   Additional orders            Initial Presentation   Milton Hughes is a 54 y.o. male who presented to the ED on   LAB: Glucose 404. A1c 10.3 WBC 12.4  Narrative & Impression  EXAM: XR FOOT RIGHT (MIN 3 VIEWS)     INDICATION: toe discoloration, foot pain.     COMPARISON: None.     FINDINGS: Three views of the right foot demonstrate no fracture or other acute  osseous or articular abnormality. The soft tissues are within normal limits.     IMPRESSION:  No acute abnormality.    HX:   Past

## 2024-12-04 NOTE — ANESTHESIA PRE PROCEDURE
Department of Anesthesiology  Preprocedure Note       Name:  Milton Hughes   Age:  54 y.o.  :  1970                                          MRN:  [id number]         Date:  12/3/2024      Surgeon: Surgeon(s):  Cecilio Salazar DPM    Procedure: Procedure(s):  RIGHT FOOT WOUND CLOSURE AND  DEBRIDEMENT, APPLICATION OF THERASKIN    Medications prior to admission:   Prior to Admission medications    Medication Sig Start Date End Date Taking? Authorizing Provider   diclofenac (VOLTAREN) 75 MG EC tablet Take 1 tablet by mouth 2 times daily as needed for Pain 3/29/24   Michael Isabel DO   naproxen (NAPROSYN) 500 MG tablet Take 1 tablet by mouth 2 times daily (with meals) 3/26/24   Stacey Back PA-C   acetaminophen (TYLENOL) 500 MG tablet Take 2 tablets by mouth every 6 hours as needed for Pain 3/26/24   Stacey Back PA-C   amLODIPine (NORVASC) 5 MG tablet Take 1 tablet by mouth daily 16   Automatic Reconciliation, Ar   metoprolol tartrate (LOPRESSOR) 25 MG tablet  16   Automatic Reconciliation, Ar   polyethylene glycol (GLYCOLAX) 17 GM/SCOOP powder Take 17 g by mouth 2 times daily 16   Automatic Reconciliation, Ar   senna-docusate (PERICOLACE) 8.6-50 MG per tablet Take 1 tablet by mouth daily 16   Automatic Reconciliation, Ar   traZODone (DESYREL) 100 MG tablet Take 1 tablet by mouth 16   Automatic Reconciliation, Ar       Current medications:    Current Facility-Administered Medications   Medication Dose Route Frequency Provider Last Rate Last Admin   • insulin lispro (HUMALOG,ADMELOG) injection vial 13 Units  13 Units SubCUTAneous TID  Jenna Peoples MD   13 Units at 24 1843   • insulin NPH (HumuLIN N;NovoLIN N) injection vial 40 Units  40 Units SubCUTAneous Once Marge Sandoval APRN - CNS       • [START ON 2024] insulin NPH (HumuLIN N;NovoLIN N) injection vial 25 Units  25 Units SubCUTAneous Q12H Marge Sandoval APRN - CNS       • glipiZIDE (GLUCOTROL) tablet 5 mg  5

## 2024-12-04 NOTE — PROGRESS NOTES
Patient seen in the room with his wife, resting in no acute distress or complaints  Post op bandage has changed removed packing improving redness, dorsal fifth metatarsal skin necrosis to mid foot packing shows purulent discharge will need further debridement and metatarsal head section scheduled for tomorrow. Keep patient NPO after breakfast hold anticoagulants please

## 2024-12-04 NOTE — PERIOP NOTE
TRANSFER - IN REPORT:    Verbal report received from Colton Carson on Milton Hughes  being received from [facility]  for ordered procedure      Report consisted of patient's Situation, Background, Assessment and   Recommendations(SBAR).     Information from the following report(s) Intake/Output, Recent Results, Cardiac Rhythm  , and Pre Procedure Checklist was reviewed with the receiving nurse.    Opportunity for questions and clarification was provided.      Assessment completed upon patient's arrival to unit and care assumed.

## 2024-12-04 NOTE — PROGRESS NOTES
End of Shift Note    Bedside shift change report given to ESTRELLA Daigle (oncoming nurse) by Colton Hilton RN (offgoing nurse).  Report included the following information SBAR, Intake/Output, MAR, and Recent Results    Shift worked:  7a-730p     Shift summary and any significant changes:     IV abx infused as scheduled see MAR. Oxycodone given x 2. Covered with insulin x1. Prepped with CHG and went down to OR at end of shift. Otherwise, uneventful shift.      Concerns for physician to address:       Zone phone for oncoming shift:          Activity:     Number times ambulated in hallways past shift: 0  Number of times OOB to chair past shift: 0    Cardiac:   Cardiac Monitoring: No           Access:   Current line(s): Peripheral IV    Genitourinary:   Urinary status: Patient is voiding without difficulty.    Respiratory:      Chronic home O2 use?: NO  Incentive spirometer at bedside: YES       GI:     Current diet:  Diet NPO  Passing flatus: Yes  Tolerating current diet: NPO       Pain Management:   Patient states pain is manageable on current regimen: Yes    Skin:     Interventions:     Patient Safety:  Fall Score: PA Fall Risk Score: 81.19    Interventions:           Length of Stay:  Expected LOS: 10  Actual LOS: 5      Colton Hilton RN

## 2024-12-04 NOTE — PROGRESS NOTES
Pharmacy Antimicrobial Kinetic Dosing    Indication for Antimicrobials: SSTI     Current Regimen of Each Antimicrobial:  Vanco - pharmacy dosing; Start Date ; Day # 6  Zosyn 4.5 g IV once then 3.375 g IV Q8H; Start Date ; Day # 6    Previous Antimicrobial Therapy:  NA     Goal Level: Vancomycin -600    Date Dose & Interval Measured (mcg/mL) Predicted AUC 24-48 Predicted AUC 24,ss    0600 1500mg q 12h 4.6 282 290    1750mg q8h 12.5 434 450            Significant Cultures:   : blood - NGTD4  : wound cx x2- group B strep, MSSA, Alpha hemolytic strep; finalized  : anerobic - NGTD    Labs:  Recent Labs     Units 24  0312 24  0442 24  0222   CREATININE MG/DL 0.58* 0.83 0.75   BUN MG/DL 14 23* 16   WBC K/uL 7.5 8.3 11.7*     Temp (24hrs), Av.6 °F (37 °C), Min:98.2 °F (36.8 °C), Max:99.3 °F (37.4 °C)    Conditions for Dosing Consideration: None    Creatinine Clearance (mL/min): Estimated Creatinine Clearance: 186 mL/min (A) (based on SCr of 0.58 mg/dL (L)).     Impression/Plan:   Continue Vancomycin 1750mg IV q8h  Will order vancomycin random at 0900 on   Watch Scr as pt is over 4g of vancomycin  Predicted RMT04-58 = 434, Predicted AUC24,ss = 450  Antimicrobial stop date 7 days (current end date is )     Pharmacy will follow daily and adjust medications as appropriate for renal function and/or serum levels.    Thank you,  Zoltan Leon [facility]

## 2024-12-05 LAB
ANION GAP SERPL CALC-SCNC: 5 MMOL/L (ref 2–12)
BUN SERPL-MCNC: 9 MG/DL (ref 6–20)
BUN/CREAT SERPL: 14 (ref 12–20)
CALCIUM SERPL-MCNC: 7.9 MG/DL (ref 8.5–10.1)
CHLORIDE SERPL-SCNC: 103 MMOL/L (ref 97–108)
CO2 SERPL-SCNC: 28 MMOL/L (ref 21–32)
CREAT SERPL-MCNC: 0.65 MG/DL (ref 0.7–1.3)
ERYTHROCYTE [DISTWIDTH] IN BLOOD BY AUTOMATED COUNT: 11.9 % (ref 11.5–14.5)
GLUCOSE BLD STRIP.AUTO-MCNC: 148 MG/DL (ref 65–117)
GLUCOSE BLD STRIP.AUTO-MCNC: 222 MG/DL (ref 65–117)
GLUCOSE BLD STRIP.AUTO-MCNC: 266 MG/DL (ref 65–117)
GLUCOSE BLD STRIP.AUTO-MCNC: 274 MG/DL (ref 65–117)
GLUCOSE SERPL-MCNC: 261 MG/DL (ref 65–100)
HCT VFR BLD AUTO: 39.3 % (ref 36.6–50.3)
HGB BLD-MCNC: 13.8 G/DL (ref 12.1–17)
MCH RBC QN AUTO: 30.9 PG (ref 26–34)
MCHC RBC AUTO-ENTMCNC: 35.1 G/DL (ref 30–36.5)
MCV RBC AUTO: 87.9 FL (ref 80–99)
NRBC # BLD: 0 K/UL (ref 0–0.01)
NRBC BLD-RTO: 0 PER 100 WBC
PLATELET # BLD AUTO: 149 K/UL (ref 150–400)
PMV BLD AUTO: 9.9 FL (ref 8.9–12.9)
POTASSIUM SERPL-SCNC: 3.8 MMOL/L (ref 3.5–5.1)
RBC # BLD AUTO: 4.47 M/UL (ref 4.1–5.7)
SERVICE CMNT-IMP: ABNORMAL
SODIUM SERPL-SCNC: 136 MMOL/L (ref 136–145)
VANCOMYCIN SERPL-MCNC: 8 UG/ML
WBC # BLD AUTO: 8.1 K/UL (ref 4.1–11.1)

## 2024-12-05 PROCEDURE — 85027 COMPLETE CBC AUTOMATED: CPT

## 2024-12-05 PROCEDURE — 6360000002 HC RX W HCPCS: Performed by: PODIATRIST

## 2024-12-05 PROCEDURE — 6370000000 HC RX 637 (ALT 250 FOR IP): Performed by: PODIATRIST

## 2024-12-05 PROCEDURE — 2580000003 HC RX 258: Performed by: INTERNAL MEDICINE

## 2024-12-05 PROCEDURE — 36415 COLL VENOUS BLD VENIPUNCTURE: CPT

## 2024-12-05 PROCEDURE — 82962 GLUCOSE BLOOD TEST: CPT

## 2024-12-05 PROCEDURE — 99231 SBSQ HOSP IP/OBS SF/LOW 25: CPT

## 2024-12-05 PROCEDURE — 2580000003 HC RX 258: Performed by: PODIATRIST

## 2024-12-05 PROCEDURE — 80048 BASIC METABOLIC PNL TOTAL CA: CPT

## 2024-12-05 PROCEDURE — 80202 ASSAY OF VANCOMYCIN: CPT

## 2024-12-05 PROCEDURE — 6360000002 HC RX W HCPCS: Performed by: INTERNAL MEDICINE

## 2024-12-05 PROCEDURE — 1100000003 HC PRIVATE W/ TELEMETRY

## 2024-12-05 PROCEDURE — 6370000000 HC RX 637 (ALT 250 FOR IP)

## 2024-12-05 RX ADMIN — ATORVASTATIN CALCIUM 40 MG: 40 TABLET, FILM COATED ORAL at 21:43

## 2024-12-05 RX ADMIN — GLIPIZIDE 5 MG: 5 TABLET ORAL at 05:46

## 2024-12-05 RX ADMIN — OXYCODONE 5 MG: 5 TABLET ORAL at 17:54

## 2024-12-05 RX ADMIN — INSULIN LISPRO 2 UNITS: 100 INJECTION, SOLUTION INTRAVENOUS; SUBCUTANEOUS at 17:54

## 2024-12-05 RX ADMIN — OXYCODONE 5 MG: 5 TABLET ORAL at 02:11

## 2024-12-05 RX ADMIN — OXYCODONE 5 MG: 5 TABLET ORAL at 09:42

## 2024-12-05 RX ADMIN — INSULIN HUMAN 25 UNITS: 100 INJECTION, SUSPENSION SUBCUTANEOUS at 08:17

## 2024-12-05 RX ADMIN — GLIPIZIDE 5 MG: 5 TABLET ORAL at 15:48

## 2024-12-05 RX ADMIN — PIPERACILLIN AND TAZOBACTAM 3375 MG: 3; .375 INJECTION, POWDER, LYOPHILIZED, FOR SOLUTION INTRAVENOUS at 01:12

## 2024-12-05 RX ADMIN — CLINDAMYCIN PHOSPHATE 600 MG: 600 INJECTION, SOLUTION INTRAVENOUS at 15:48

## 2024-12-05 RX ADMIN — FAMOTIDINE 20 MG: 20 TABLET, FILM COATED ORAL at 21:44

## 2024-12-05 RX ADMIN — PIPERACILLIN AND TAZOBACTAM 3375 MG: 3; .375 INJECTION, POWDER, LYOPHILIZED, FOR SOLUTION INTRAVENOUS at 18:23

## 2024-12-05 RX ADMIN — CLINDAMYCIN PHOSPHATE 600 MG: 600 INJECTION, SOLUTION INTRAVENOUS at 00:29

## 2024-12-05 RX ADMIN — LISINOPRIL 40 MG: 20 TABLET ORAL at 08:17

## 2024-12-05 RX ADMIN — OXYCODONE 5 MG: 5 TABLET ORAL at 13:43

## 2024-12-05 RX ADMIN — CLINDAMYCIN PHOSPHATE 600 MG: 600 INJECTION, SOLUTION INTRAVENOUS at 08:07

## 2024-12-05 RX ADMIN — PIPERACILLIN AND TAZOBACTAM 3375 MG: 3; .375 INJECTION, POWDER, LYOPHILIZED, FOR SOLUTION INTRAVENOUS at 09:48

## 2024-12-05 RX ADMIN — INSULIN HUMAN 25 UNITS: 100 INJECTION, SUSPENSION SUBCUTANEOUS at 21:44

## 2024-12-05 RX ADMIN — INSULIN LISPRO 4 UNITS: 100 INJECTION, SOLUTION INTRAVENOUS; SUBCUTANEOUS at 21:44

## 2024-12-05 RX ADMIN — INSULIN LISPRO 4 UNITS: 100 INJECTION, SOLUTION INTRAVENOUS; SUBCUTANEOUS at 08:17

## 2024-12-05 RX ADMIN — FAMOTIDINE 20 MG: 20 TABLET, FILM COATED ORAL at 08:17

## 2024-12-05 RX ADMIN — OXYCODONE 5 MG: 5 TABLET ORAL at 21:43

## 2024-12-05 RX ADMIN — OXYCODONE 5 MG: 5 TABLET ORAL at 05:46

## 2024-12-05 ASSESSMENT — PAIN DESCRIPTION - ORIENTATION
ORIENTATION: RIGHT

## 2024-12-05 ASSESSMENT — PAIN SCALES - GENERAL
PAINLEVEL_OUTOF10: 4
PAINLEVEL_OUTOF10: 6
PAINLEVEL_OUTOF10: 6
PAINLEVEL_OUTOF10: 1
PAINLEVEL_OUTOF10: 4
PAINLEVEL_OUTOF10: 8
PAINLEVEL_OUTOF10: 6
PAINLEVEL_OUTOF10: 8
PAINLEVEL_OUTOF10: 8
PAINLEVEL_OUTOF10: 1
PAINLEVEL_OUTOF10: 4
PAINLEVEL_OUTOF10: 8
PAINLEVEL_OUTOF10: 3

## 2024-12-05 ASSESSMENT — PAIN DESCRIPTION - DESCRIPTORS
DESCRIPTORS: ACHING
DESCRIPTORS: SHARP
DESCRIPTORS: SHARP
DESCRIPTORS: ACHING
DESCRIPTORS: ACHING
DESCRIPTORS: SHARP

## 2024-12-05 ASSESSMENT — PAIN DESCRIPTION - LOCATION
LOCATION: FOOT
LOCATION: LEG
LOCATION: FOOT

## 2024-12-05 ASSESSMENT — PAIN DESCRIPTION - FREQUENCY: FREQUENCY: INTERMITTENT

## 2024-12-05 ASSESSMENT — PAIN SCALES - WONG BAKER
WONGBAKER_NUMERICALRESPONSE: HURTS A LITTLE BIT
WONGBAKER_NUMERICALRESPONSE: HURTS A LITTLE BIT

## 2024-12-05 ASSESSMENT — PAIN DESCRIPTION - PAIN TYPE: TYPE: SURGICAL PAIN

## 2024-12-05 NOTE — DIABETES MGMT
[facility]  DIABETES MANAGEMENT CONSULT    Consulted by Provider for advanced nursing evaluation and care for inpatient blood glucose management.    Evaluation and Action Plan   Milton Hughes is a 54 year old male patient with a hx of Type 2 DM. His current A1c is 10.3% which demonstrates uncontrolled blood sugars prior to admission. The patient admitted after experiencing fevers and right foot pain, swelling and discoloration.   The patent reports that he took Metformin in the past but did not tolerate well. He has not taking any medication for diabetes in years. The patient is amenable to using insulin at discharge. He does not have medical insurance, so Relion bran NPH discussed. The patient reports that he could afford the 25-26 dollars per vial. Insulin switched to NPH and dosing adjusted.   Bg's are much improved. Insulin pen administration with fake skin model demonstrated. Also vial and insulin syringe administration demonstrated. Survival skills discussed. Handouts provided.   BG's stabilizing. Continue the current insulin dosing for now.     Blood glucose pattern          Management Rationale Action Plan   Medication   Basal needs Using 0.5 units/kg/D based on weight  Use 25 units NPH Q 12 hours   Corrective insulin Using medium dose sensitivity based on weight    Additional orders  Carb consistent diet (60g CHO/meal)  Add glipizide with breakfast and dinner       Diabetes Discharge Plan   Medication  TBD   Referral  [x]        Outpatient diabetes education   Additional orders            Initial Presentation   Milton Hughes is a 54 y.o. male who presented to the ED on   LAB: Glucose 404. A1c 10.3 WBC 12.4  Narrative & Impression  EXAM: XR FOOT RIGHT (MIN 3 VIEWS)     INDICATION: toe discoloration, foot pain.     COMPARISON: None.     FINDINGS: Three views of the right foot demonstrate no fracture or other acute  osseous or articular abnormality. The soft tissues are within

## 2024-12-05 NOTE — PROGRESS NOTES
Hospitalist Progress Note    NAME:   Milton Hughes   : 1970   MRN: [id number]     Date/Time: 2024 4:48 PM  Patient PCP: No primary care provider on file.    Estimated discharge date: >48 hrs  Barriers: cultures and pathology    Assessment / Plan:    Sepsis secondary to osteomyelitis of right foot, right lower extremity cellulitis and necrotic toe, resolved  Osteomyelitis of right foot, necrotic toe  Diabetic foot ulcer with gangrene of right fifth toe  Leukocytosis secondary to left lower extremity cellulitis and necrotic toe  Sepsis based on elevated CRP 21.30 initial presentation and leukocytosis, improved    Continue broad-spectrum antibiotic Zosyn and vancomycin  Status post debridement amputation on 2024  Will follow-up on intraoperative cultures  Cultures from  are positive for Streptococcus  Pain control  Tylenol as needed  Oxycodone and Dilaudid  Vascular surgery cleared, excellent toe pressures-recommended continued care with podiatry.  CBC daily  Podiatry on board  Status post right foot wound culture and debridement     Poorly controlled diabetes  - With A1c of 10  - Patient does not take any medication, has not seen his primary care doctor for many years  --Diabetic management consult placed  -- continue sliding scale insulin  -Continue NPH 25 units every 12 hours as recommended by diabetes management-sent to patient's pharmacy to see if this is a more affordable option for him    Hyperlipidemia  - LDL of 93, given patient is high risk with uncontrolled diabetes and LDL needs to be less than 70 started on Lipitor 40 mg daily     Hypertension  - Given patient is diabetic and renal protective effect of ACE inhibitors,  stopped amlodipine and started on lisinopril 40 mg daily  Given patient is high risk for coronary artery disease recommend baby aspirin 81 mg daily    Medical Decision Making:   I personally reviewed labs: CBC BMP  I personally reviewed imaging:   I personally

## 2024-12-05 NOTE — PROGRESS NOTES
End of Shift Note    Bedside shift change report given to ESTRELLA leonard (oncoming nurse) by Colton Hilton RN (offgoing nurse).  Report included the following information SBAR, Procedure Summary, Intake/Output, MAR, and Recent Results    Shift worked:  7a-730p     Shift summary and any significant changes:     Pt rested in bed majority of shift. Pain managed with oxycodone given x 3 see mar. IV abx infused as schedules see mar. Vanc levels obtained by phlebotomy and sent to lab. Pt ambulating independently and tolerating well. Insulin coverage x 2. Otherwise, uneventful       Concerns for physician to address:       Zone phone for oncoming shift:          Activity:     Number times ambulated in hallways past shift: 0  Number of times OOB to chair past shift: 1    Cardiac:   Cardiac Monitoring: No           Access:   Current line(s): Peripheral IV    Genitourinary:   Urinary status: Patient is voiding without difficulty.    Respiratory:      Chronic home O2 use?: NO  Incentive spirometer at bedside: YES       GI:     Current diet:  ADULT DIET; Regular; 3 carb choices (45 gm/meal)  Passing flatus: Yes  Tolerating current diet: Yes       Pain Management:   Patient states pain is manageable on current regimen: Yes    Skin:     Interventions:     Patient Safety:  Fall Score: PA Fall Risk Score: 76.01    Interventions:           Length of Stay:  Expected LOS: 10  Actual LOS: 6      Colton Hilton RN

## 2024-12-05 NOTE — BRIEF OP NOTE
Brief Postoperative Note      Patient: Milton Hughes  YOB: 1970  MRN: [id number]    Date of Procedure: 12/4/2024    Pre-Op Diagnosis Codes:      * Osteomyelitis of right foot, unspecified type [M86.9]    Post-Op Diagnosis: Same and dorsal flap necrosis        Procedure(s):  RIGHT FOOT WOUND CLOSURE AND  DEBRIDEMENT, APPLICATION OF THERASKIN AND STRAVIX PL    Surgeon(s):  Cecilio Salazar DPM    Assistant:  * No surgical staff found *    Anesthesia: Monitor Anesthesia Care    Hemostasis: Ankle Tourniquet at 250 mm Hg    Estimated Blood Loss (mL): less than 50     Complications: None    Specimens:   ID Type Source Tests Collected by Time Destination   1 : Right fifth metatarsal head Bone Foot SURGICAL PATHOLOGY Cecilio Salazar DPM 12/4/2024 2009    A : Right foot wound culture Swab Foot CULTURE, ANAEROBIC, CULTURE, WOUND Cecilio Salazar DPM 12/4/2024 2012        Implants:  Implant Name Type Inv. Item Serial No.  Lot No. LRB No. Used Action   THERASKIN LG 39SQ CM 5.1X7.6CM - [id number]  THERASKIN LG 39SQ CM 5.1X7.6CM [id number] [employer]IF[employer]WD NA Right 1 Implanted   GRAFT STRAVIX PL AMNIOTIC MEMBRANE 3CM X 6CM - [id number]  GRAFT STRAVIX PL AMNIOTIC MEMBRANE 3CM X 6CM [id number] [employer]W-WD NA Right 1 Implanted   THERASKIN LG 39SQ CM 5.1X7.6CM - [id number]  THERASKIN LG 39SQ CM 5.1X7.6CM [id number] [employer]IFENET[employer]WD NA Right 1 Implanted         Drains: * No LDAs found *    Findings:  Infection Present At Time Of Surgery (PATOS) (choose all levels that have infection present):  - Deep Infection (muscle/fascia) present as evidenced by fluid consistent with infection  Other Findings: viable after debridement     Electronically signed by Cecilio Salazar DPM on 12/4/2024 at 8:40 PM

## 2024-12-05 NOTE — ANESTHESIA POSTPROCEDURE EVALUATION
Department of Anesthesiology  Postprocedure Note    Patient: Milton Hughes  MRN: [id number]  YOB: 1970  Date of evaluation: 12/4/2024    Procedure Summary       Date: 12/04/24 Room / Location: [facility] MAIN OR M3 / [facility] MAIN OR    Anesthesia Start: 1925 Anesthesia Stop: 2047    Procedure: RIGHT FOOT WOUND CLOSURE AND  DEBRIDEMENT, APPLICATION OF THERASKIN AND STRAVIX PL (Right: Foot) Diagnosis:       Osteomyelitis of right foot, unspecified type      (Osteomyelitis of right foot, unspecified type [M86.9])    Providers: Cecilio Salazar DPM Responsible Provider: Giovanni Cardona MD    Anesthesia Type: MAC ASA Status: 3            Anesthesia Type: MAC    Thais Phase I: Thais Score: 10    Thais Phase II:      Anesthesia Post Evaluation    Patient location during evaluation: PACU  Patient participation: complete - patient participated  Level of consciousness: sleepy but conscious and responsive to verbal stimuli  Pain score: 1  Airway patency: patent  Nausea & Vomiting: no vomiting and no nausea  Cardiovascular status: blood pressure returned to baseline and hemodynamically stable  Respiratory status: acceptable  Hydration status: stable  Multimodal analgesia pain management approach  Pain management: adequate    No notable events documented.

## 2024-12-06 LAB
ANION GAP SERPL CALC-SCNC: 5 MMOL/L (ref 2–12)
BACTERIA SPEC CULT: ABNORMAL
BACTERIA SPEC CULT: NORMAL
BUN SERPL-MCNC: 10 MG/DL (ref 6–20)
BUN/CREAT SERPL: 15 (ref 12–20)
CALCIUM SERPL-MCNC: 8.2 MG/DL (ref 8.5–10.1)
CHLORIDE SERPL-SCNC: 102 MMOL/L (ref 97–108)
CO2 SERPL-SCNC: 30 MMOL/L (ref 21–32)
CREAT SERPL-MCNC: 0.68 MG/DL (ref 0.7–1.3)
ERYTHROCYTE [DISTWIDTH] IN BLOOD BY AUTOMATED COUNT: 11.9 % (ref 11.5–14.5)
GLUCOSE BLD STRIP.AUTO-MCNC: 149 MG/DL (ref 65–117)
GLUCOSE BLD STRIP.AUTO-MCNC: 214 MG/DL (ref 65–117)
GLUCOSE BLD STRIP.AUTO-MCNC: 216 MG/DL (ref 65–117)
GLUCOSE BLD STRIP.AUTO-MCNC: 256 MG/DL (ref 65–117)
GLUCOSE SERPL-MCNC: 162 MG/DL (ref 65–100)
GRAM STN SPEC: ABNORMAL
GRAM STN SPEC: ABNORMAL
HCT VFR BLD AUTO: 40 % (ref 36.6–50.3)
HGB BLD-MCNC: 14 G/DL (ref 12.1–17)
MCH RBC QN AUTO: 30.9 PG (ref 26–34)
MCHC RBC AUTO-ENTMCNC: 35 G/DL (ref 30–36.5)
MCV RBC AUTO: 88.3 FL (ref 80–99)
NRBC # BLD: 0 K/UL (ref 0–0.01)
NRBC BLD-RTO: 0 PER 100 WBC
PLATELET # BLD AUTO: 157 K/UL (ref 150–400)
PMV BLD AUTO: 9.9 FL (ref 8.9–12.9)
POTASSIUM SERPL-SCNC: 3.7 MMOL/L (ref 3.5–5.1)
RBC # BLD AUTO: 4.53 M/UL (ref 4.1–5.7)
SERVICE CMNT-IMP: ABNORMAL
SERVICE CMNT-IMP: NORMAL
SODIUM SERPL-SCNC: 137 MMOL/L (ref 136–145)
WBC # BLD AUTO: 8.5 K/UL (ref 4.1–11.1)

## 2024-12-06 PROCEDURE — 6370000000 HC RX 637 (ALT 250 FOR IP): Performed by: PODIATRIST

## 2024-12-06 PROCEDURE — 2500000003 HC RX 250 WO HCPCS: Performed by: PODIATRIST

## 2024-12-06 PROCEDURE — 82962 GLUCOSE BLOOD TEST: CPT

## 2024-12-06 PROCEDURE — 6360000002 HC RX W HCPCS: Performed by: STUDENT IN AN ORGANIZED HEALTH CARE EDUCATION/TRAINING PROGRAM

## 2024-12-06 PROCEDURE — 97116 GAIT TRAINING THERAPY: CPT

## 2024-12-06 PROCEDURE — 2580000003 HC RX 258: Performed by: INTERNAL MEDICINE

## 2024-12-06 PROCEDURE — 1100000003 HC PRIVATE W/ TELEMETRY

## 2024-12-06 PROCEDURE — 6370000000 HC RX 637 (ALT 250 FOR IP)

## 2024-12-06 PROCEDURE — 99231 SBSQ HOSP IP/OBS SF/LOW 25: CPT

## 2024-12-06 PROCEDURE — 97161 PT EVAL LOW COMPLEX 20 MIN: CPT

## 2024-12-06 PROCEDURE — 6370000000 HC RX 637 (ALT 250 FOR IP): Performed by: STUDENT IN AN ORGANIZED HEALTH CARE EDUCATION/TRAINING PROGRAM

## 2024-12-06 PROCEDURE — 6360000002 HC RX W HCPCS: Performed by: INTERNAL MEDICINE

## 2024-12-06 PROCEDURE — 80048 BASIC METABOLIC PNL TOTAL CA: CPT

## 2024-12-06 PROCEDURE — 36415 COLL VENOUS BLD VENIPUNCTURE: CPT

## 2024-12-06 PROCEDURE — 85027 COMPLETE CBC AUTOMATED: CPT

## 2024-12-06 PROCEDURE — 2580000003 HC RX 258: Performed by: PODIATRIST

## 2024-12-06 RX ORDER — GLIPIZIDE 10 MG/1
1 TABLET ORAL PRN
Status: DISCONTINUED | OUTPATIENT
Start: 2024-12-06 | End: 2024-12-12 | Stop reason: HOSPADM

## 2024-12-06 RX ADMIN — FAMOTIDINE 20 MG: 20 TABLET, FILM COATED ORAL at 08:50

## 2024-12-06 RX ADMIN — PIPERACILLIN AND TAZOBACTAM 3375 MG: 3; .375 INJECTION, POWDER, LYOPHILIZED, FOR SOLUTION INTRAVENOUS at 01:17

## 2024-12-06 RX ADMIN — INSULIN LISPRO 4 UNITS: 100 INJECTION, SOLUTION INTRAVENOUS; SUBCUTANEOUS at 21:20

## 2024-12-06 RX ADMIN — INSULIN LISPRO 2 UNITS: 100 INJECTION, SOLUTION INTRAVENOUS; SUBCUTANEOUS at 18:07

## 2024-12-06 RX ADMIN — ACETAMINOPHEN 325MG 650 MG: 325 TABLET ORAL at 05:02

## 2024-12-06 RX ADMIN — DEXTRAN 70, GLYCERIN, HYPROMELLOSE 1 DROP: 1; 2; 3 SOLUTION/ DROPS OPHTHALMIC at 20:59

## 2024-12-06 RX ADMIN — OXYCODONE 5 MG: 5 TABLET ORAL at 05:47

## 2024-12-06 RX ADMIN — CLINDAMYCIN PHOSPHATE 600 MG: 600 INJECTION, SOLUTION INTRAVENOUS at 17:07

## 2024-12-06 RX ADMIN — INSULIN LISPRO 2 UNITS: 100 INJECTION, SOLUTION INTRAVENOUS; SUBCUTANEOUS at 12:57

## 2024-12-06 RX ADMIN — FAMOTIDINE 20 MG: 20 TABLET, FILM COATED ORAL at 20:56

## 2024-12-06 RX ADMIN — OXYCODONE 5 MG: 5 TABLET ORAL at 01:45

## 2024-12-06 RX ADMIN — OXYCODONE 5 MG: 5 TABLET ORAL at 18:06

## 2024-12-06 RX ADMIN — GLIPIZIDE 5 MG: 5 TABLET ORAL at 08:51

## 2024-12-06 RX ADMIN — INSULIN HUMAN 25 UNITS: 100 INJECTION, SUSPENSION SUBCUTANEOUS at 21:20

## 2024-12-06 RX ADMIN — PIPERACILLIN AND TAZOBACTAM 3375 MG: 3; .375 INJECTION, POWDER, LYOPHILIZED, FOR SOLUTION INTRAVENOUS at 09:36

## 2024-12-06 RX ADMIN — ENOXAPARIN SODIUM 30 MG: 100 INJECTION SUBCUTANEOUS at 20:56

## 2024-12-06 RX ADMIN — CLINDAMYCIN PHOSPHATE 600 MG: 600 INJECTION, SOLUTION INTRAVENOUS at 00:08

## 2024-12-06 RX ADMIN — SODIUM CHLORIDE, PRESERVATIVE FREE 10 ML: 5 INJECTION INTRAVENOUS at 20:56

## 2024-12-06 RX ADMIN — GLIPIZIDE 5 MG: 5 TABLET ORAL at 17:02

## 2024-12-06 RX ADMIN — ATORVASTATIN CALCIUM 40 MG: 40 TABLET, FILM COATED ORAL at 20:56

## 2024-12-06 RX ADMIN — OXYCODONE 5 MG: 5 TABLET ORAL at 22:15

## 2024-12-06 RX ADMIN — OXYCODONE 5 MG: 5 TABLET ORAL at 13:55

## 2024-12-06 RX ADMIN — INSULIN HUMAN 25 UNITS: 100 INJECTION, SUSPENSION SUBCUTANEOUS at 08:51

## 2024-12-06 RX ADMIN — CLINDAMYCIN PHOSPHATE 600 MG: 600 INJECTION, SOLUTION INTRAVENOUS at 08:57

## 2024-12-06 RX ADMIN — PIPERACILLIN AND TAZOBACTAM 3375 MG: 3; .375 INJECTION, POWDER, LYOPHILIZED, FOR SOLUTION INTRAVENOUS at 19:02

## 2024-12-06 RX ADMIN — LISINOPRIL 40 MG: 20 TABLET ORAL at 08:50

## 2024-12-06 RX ADMIN — OXYCODONE 5 MG: 5 TABLET ORAL at 09:38

## 2024-12-06 ASSESSMENT — PAIN DESCRIPTION - LOCATION
LOCATION: LEG
LOCATION: FOOT
LOCATION: LEG
LOCATION: FOOT
LOCATION: FOOT
LOCATION: LEG
LOCATION: FOOT
LOCATION: FOOT

## 2024-12-06 ASSESSMENT — PAIN DESCRIPTION - ORIENTATION
ORIENTATION: RIGHT

## 2024-12-06 ASSESSMENT — PAIN DESCRIPTION - DESCRIPTORS
DESCRIPTORS: ACHING

## 2024-12-06 ASSESSMENT — PAIN SCALES - GENERAL
PAINLEVEL_OUTOF10: 7
PAINLEVEL_OUTOF10: 8
PAINLEVEL_OUTOF10: 2
PAINLEVEL_OUTOF10: 8
PAINLEVEL_OUTOF10: 5
PAINLEVEL_OUTOF10: 8
PAINLEVEL_OUTOF10: 2
PAINLEVEL_OUTOF10: 10
PAINLEVEL_OUTOF10: 3

## 2024-12-06 ASSESSMENT — PAIN - FUNCTIONAL ASSESSMENT: PAIN_FUNCTIONAL_ASSESSMENT: ACTIVITIES ARE NOT PREVENTED

## 2024-12-06 ASSESSMENT — PAIN DESCRIPTION - PAIN TYPE: TYPE: SURGICAL PAIN

## 2024-12-06 ASSESSMENT — PAIN DESCRIPTION - FREQUENCY: FREQUENCY: INTERMITTENT

## 2024-12-06 NOTE — PROGRESS NOTES
Hospitalist Progress Note    NAME:   Milton Hughes   : 1970   MRN: [id number]     Date/Time: 2024 12:59 PM  Patient PCP: No primary care provider on file.    Estimated discharge date: >48 hrs  Barriers: cultures and pathology    Assessment / Plan:    Sepsis secondary to osteomyelitis of right foot, right lower extremity cellulitis and necrotic toe, resolved  Osteomyelitis of right foot, necrotic toe  Diabetic foot ulcer with gangrene of right fifth toe  Leukocytosis secondary to left lower extremity cellulitis and necrotic toe  Sepsis based on elevated CRP 21.30 initial presentation and leukocytosis, improved    Continue broad-spectrum antibiotic Zosyn and vancomycin  Status post debridement amputation on 2024  Cultures from  are positive for Streptococcus  Status post repeat debridement 2024  Await cultures and pathology    Pain control  Tylenol as needed  Oxycodone and Dilaudid  Vascular surgery cleared, excellent toe pressures-recommended continued care with podiatry.  CBC daily  Podiatry on board, await cultures/pathology and clearance prior to discharge    Poorly controlled diabetes   A1c of 10  - Patient does not take any medication, has not seen his primary care doctor for many years  --Diabetic management consult placed  -- continue sliding scale insulin  -Continue NPH 25 units every 12 hours as recommended by diabetes management-sent to patient's pharmacy to see if this is a more affordable option for him    Hyperlipidemia  - LDL of 93, given patient is high risk with uncontrolled diabetes   --LDL needs to be less than 70 started on Lipitor 40 mg daily     Hypertension  - Given patient is diabetic and renal protective effect of ACE inhibitors,  stopped amlodipine and started on lisinopril 40 mg daily  Given patient is high risk for coronary artery disease recommend baby aspirin 81 mg daily    Medical Decision Making:   I personally reviewed labs: CBC, BMP  I personally

## 2024-12-06 NOTE — PLAN OF CARE
Problem: Physical Therapy - Adult  Goal: By Discharge: Performs mobility at highest level of function for planned discharge setting.  See evaluation for individualized goals.  Outcome: Adequate for Discharge     PHYSICAL THERAPY EVALUATION/DISCHARGE    Patient: Milton Hughes (54 y.o. male)  Date: 12/6/2024  Primary Diagnosis: PVD (peripheral vascular disease) ([facility]) [I73.9]  Cellulitis of right lower extremity [L03.115]  Right foot infection [L08.9]  Left foot infection [L08.9]  Right foot ulcer, with unspecified severity ([facility]) [L97.519]  Dusky discoloration of skin [R23.8]  Procedure(s) (LRB):  RIGHT FOOT WOUND CLOSURE AND  DEBRIDEMENT, APPLICATION OF THERASKIN AND STRAVIX PL (Right) 2 Days Post-Op   Precautions: Weight Bearing, Other (Comment), Fall Risk (wear post-op shoe during mobility) Right Lower Extremity Weight Bearing: Weight Bearing As Tolerated                **Noted conflicting WB statuses in chart (50% vs. WBAT R foot in Post op shoe), clarification pending. Pt refusing use of RW, encouraged heel WB to limit pressure on R foot, but poor adherance    ASSESSMENT AND RECOMMENDATIONS:  Based on the objective data below, the patient does not have further acute skilled physical therapy needs. Pt presented to ED with painful R foot and febrile, admitted for medical workup, had R 5th toe amputation on 12/1/24, wound closure and I&D on 12/4/24. Pt received for PT up in chair and pt consented to treatment after maximal encouragement and education on safe mobility. Pt noted to be ambulating in room just before PT arrival, with post op shoe on and without RW. Education provided on need for use of RW to limit weight bearing on R foot and control pain, pt stating \"I don't need that d**n thing, let's just walk and get this s**t over with\" regarding mobility eval.     Pt was supervision assist for sit<>stand, adamantly refusing use of RW this date. Pt needed max encouragement and education for donning of gait belt,

## 2024-12-06 NOTE — PLAN OF CARE
Problem: Chronic Conditions and Co-morbidities  Goal: Patient's chronic conditions and co-morbidity symptoms are monitored and maintained or improved  12/6/2024 1135 by Vikas Cazares RN  Outcome: Progressing  12/6/2024 0029 by Carmen Fitzgerald RN  Outcome: Progressing  Flowsheets (Taken 12/5/2024 2050)  Care Plan - Patient's Chronic Conditions and Co-Morbidity Symptoms are Monitored and Maintained or Improved: Monitor and assess patient's chronic conditions and comorbid symptoms for stability, deterioration, or improvement     Problem: Pain  Goal: Verbalizes/displays adequate comfort level or baseline comfort level  12/6/2024 1135 by Vikas Cazares RN  Outcome: Progressing  12/6/2024 0029 by Carmen Fitzgerald RN  Outcome: Progressing     Problem: Skin/Tissue Integrity - Adult  Goal: Incisions, wounds, or drain sites healing without S/S of infection  12/6/2024 1135 by Vikas Cazares RN  Outcome: Progressing  12/6/2024 0029 by Carmen Fitzgerald RN  Outcome: Progressing     Problem: Musculoskeletal - Adult  Goal: Return mobility to safest level of function  12/6/2024 1135 by Vikas Cazares RN  Outcome: Progressing  12/6/2024 0029 by Carmen Fitzgerald RN  Outcome: Progressing  Flowsheets (Taken 12/5/2024 2050)  Return Mobility to Safest Level of Function: Assess patient stability and activity tolerance for standing, transferring and ambulating with or without assistive devices     Problem: Safety - Adult  Goal: Free from fall injury  12/6/2024 1135 by Vikas Cazares RN  Outcome: Progressing  12/6/2024 0029 by Carmen Fitzgerald RN  Outcome: Progressing     Problem: Skin/Tissue Integrity  Goal: Absence of new skin breakdown  Description: 1.  Monitor for areas of redness and/or skin breakdown  2.  Assess vascular access sites hourly  3.  Every 4-6 hours minimum:  Change oxygen saturation probe site  4.  Every 4-6 hours:  If on nasal continuous positive airway pressure, respiratory therapy assess nares  and determine need for appliance change or resting period.  12/6/2024 0029 by Carmen Fitzgerald RN  Outcome: Progressing     Problem: Discharge Planning  Goal: Discharge to home or other facility with appropriate resources  12/6/2024 1135 by Vikas Cazares RN  Outcome: Not Progressing  12/6/2024 0029 by Carmen Fitzgerald RN  Outcome: Progressing  Flowsheets (Taken 12/5/2024 2050)  Discharge to home or other facility with appropriate resources:   Identify barriers to discharge with patient and caregiver   Arrange for needed discharge resources and transportation as appropriate   Identify discharge learning needs (meds, wound care, etc)

## 2024-12-06 NOTE — WOUND CARE
Wound care consult for the Right foot wound post op day # 2 from Podiatric surgery. Chart reviewed and patient assessed. Pt. Had surgery from Dr. Salazar on Wednesday. He did I&D with amputation of the right 5th toe and part of the matatarsal. Pt. Apparently stepped on \"something\" a few days prior to admission and it quickly got infected and needed surgery the next day.   Pt. Has weight baring orders with the post op shoe in place.   Assessment: Pt. Is alert and c/o pain in the \"curviture on the side of the foot\" where the staples are in place. The area is red with serosanguinous drainage in the entire wound. The wound appears to be clean with the Theraskin taking adhesion is most of the wound. The edges are crusty and there is some slough in the middle next to the amputation site.     Right foot post op - Theraskin graft:       Lateral right foot / plantar aspect:       Treatment: the wound was cleansed gently with the Vashe irrigation and then gently wiped along the edges and top of the wound. The graft remained in place. Covered with the Mepitel silicone dressing, then the Silvasorb hydrogel and then Opticel Ag x 2 dressings to keep it completely moist over the weekend. Covered with a thick ABD high drainage pad and wrapped with the small roll marivel and then the ACE wrap.   Plan: Leave the current dressing in place until Monday. We will re-evaluate for the wound vac on Monday.  Pt. Was taught how to properly Float the heels.   Encourage Wound healing nutrition with high protein and Alin - packets mixed with water. Drink in less than 1 hour.  Marcy Gallardo, RN, BSN, CWON

## 2024-12-06 NOTE — PROGRESS NOTES
Comprehensive Nutrition Assessment    Type and Reason for Visit:  Initial, LOS    Nutrition Recommendations/Plan:   Continue current diet  Monitor and record PO intakes and Bms in I/Os     Malnutrition Assessment:  Malnutrition Status:  No malnutrition (12/06/24 1507)    Context:  Acute Illness     Findings of the 6 clinical characteristics of malnutrition:  Energy Intake:  No decrease in energy intake  Weight Loss:  No weight loss     Body Fat Loss:  Unable to assess     Muscle Mass Loss:  Unable to assess    Fluid Accumulation:  No fluid accumulation     Strength:  Not Performed    Nutrition Assessment:    Admitted for R foot ulcer, now s/p R 5th toe amputation, I&D of foot. PMHx includes DM. Screened for LOS. Pt with good documented intakes, >76%. No recent significant weight loss per EMR wt hx. Plan to continue diet.    Nutrition Related Findings:    Labs: Na 137, K 3.7, BUN 10, Creat 0.68, Gluc 214. Meds: atorvastatin, famotidine, glipizide, insulin lispro, insulin NPH.Trace RLE edema. BM 12/4. Wound Type: Surgical Incision       Current Nutrition Intake & Therapies:    Average Meal Intake: %  Average Supplements Intake: None Ordered  ADULT DIET; Regular; 3 carb choices (45 gm/meal)    Anthropometric Measures:  Height: 183 cm (6' 0.05\")  Ideal Body Weight (IBW): 178 lbs (81 kg)    Current Body Weight: 106.6 kg (235 lb 0.2 oz), 132 % IBW. Weight Source: Bed scale  Current BMI (kg/m2): 31.8  BMI Categories: Obese Class 1 (BMI 30.0-34.9)    Estimated Daily Nutrient Needs:  Energy Requirements Based On: Kcal/kg  Weight Used for Energy Requirements: Adjusted  Energy (kcal/day): 2183kcal (25kcal/kg)  Weight Used for Protein Requirements: Adjusted  Protein (g/day): 105g (1.2g/kg)  Method Used for Fluid Requirements: 1 ml/kcal  Fluid (ml/day): 2183mL    Nutrition Diagnosis:   Increased nutrient needs related to catabolic illness as evidenced by wounds    Nutrition Interventions:   Food and/or Nutrient

## 2024-12-06 NOTE — PLAN OF CARE
Problem: Chronic Conditions and Co-morbidities  Goal: Patient's chronic conditions and co-morbidity symptoms are monitored and maintained or improved  Outcome: Progressing  Flowsheets (Taken 12/5/2024 2050)  Care Plan - Patient's Chronic Conditions and Co-Morbidity Symptoms are Monitored and Maintained or Improved: Monitor and assess patient's chronic conditions and comorbid symptoms for stability, deterioration, or improvement     Problem: Discharge Planning  Goal: Discharge to home or other facility with appropriate resources  Outcome: Progressing  Flowsheets (Taken 12/5/2024 2050)  Discharge to home or other facility with appropriate resources:   Identify barriers to discharge with patient and caregiver   Arrange for needed discharge resources and transportation as appropriate   Identify discharge learning needs (meds, wound care, etc)     Problem: Pain  Goal: Verbalizes/displays adequate comfort level or baseline comfort level  Outcome: Progressing     Problem: Skin/Tissue Integrity - Adult  Goal: Incisions, wounds, or drain sites healing without S/S of infection  Outcome: Progressing     Problem: Musculoskeletal - Adult  Goal: Return mobility to safest level of function  Outcome: Progressing  Flowsheets (Taken 12/5/2024 2050)  Return Mobility to Safest Level of Function: Assess patient stability and activity tolerance for standing, transferring and ambulating with or without assistive devices     Problem: Safety - Adult  Goal: Free from fall injury  Outcome: Progressing     Problem: Skin/Tissue Integrity  Goal: Absence of new skin breakdown  Description: 1.  Monitor for areas of redness and/or skin breakdown  2.  Assess vascular access sites hourly  3.  Every 4-6 hours minimum:  Change oxygen saturation probe site  4.  Every 4-6 hours:  If on nasal continuous positive airway pressure, respiratory therapy assess nares and determine need for appliance change or resting period.  Outcome: Progressing

## 2024-12-06 NOTE — PROGRESS NOTES
Pathology result d/w Dr. Salazar, podiatry.  No OM. Asking pt to discharge home with 10 day course of PO abx.     Recommend pt to complete 10 day of abx therapy from the post op with Augmentin, end date 12/14/2024.   Recommend pt to follow up with the podiatry upon discharge.     ID team signing off. Please contact us with any questions.     Plan of care d/w Dr. Mcgregor and Dr. Arreola

## 2024-12-06 NOTE — DIABETES MGMT
[facility]  DIABETES MANAGEMENT CONSULT    Consulted by Provider for advanced nursing evaluation and care for inpatient blood glucose management.    Evaluation and Action Plan   Milton Hughes is a 54 year old male patient with a hx of Type 2 DM. His current A1c is 10.3% which demonstrates uncontrolled blood sugars prior to admission. The patient admitted after experiencing fevers and right foot pain, swelling and discoloration.   The patent reports that he took Metformin in the past but did not tolerate well. He has not taking any medication for diabetes in years. The patient is amenable to using insulin at discharge. He does not have medical insurance, so Relion bran NPH discussed. The patient reports that he could afford the 25-26 dollars per vial. Insulin switched to NPH and dosing adjusted.   Bg's are much improved. Insulin pen administration with fake skin model demonstrated. Also vial and insulin syringe administration demonstrated. Survival skills discussed. Handouts provided.   BG's stabilizing. Continue the current insulin dosing for now.   BG's improving . Continue the current diabetes diabetes regimen    Blood glucose pattern          Management Rationale Action Plan   Medication   Basal needs Using 0.5 units/kg/D based on weight  Use 25 units NPH Q 12 hours   Corrective insulin Using medium dose sensitivity based on weight    Additional orders  Carb consistent diet (60g CHO/meal)  Add glipizide with breakfast and dinner       Diabetes Discharge Plan   Medication  Use 25 units NPH  q 12 hours( Relion brand at [facility]) $25.88 per vial  Use glipizide 5 mg twice daily before meals   Will need insulin syringes and glucometer kit and supplies ( Relion [employer] at [facility] will be most affordable)   Referral  [x]        Outpatient diabetes education   Additional orders            Initial Presentation   Milton Hughes is a 54 y.o. male who presented to the ED on   LAB: Glucose 404. A1c

## 2024-12-06 NOTE — PROGRESS NOTES
Occupational Therapy    OT referral received, chart reviewed and discussed with PT. Pt observed to be independently ambulating in hallway with PT, no AD. Per PT, completing ADLs in room without assist. Acute OT services not indicated. OT will sign off.

## 2024-12-06 NOTE — PROGRESS NOTES
End of Shift Note    Bedside shift change report given to ESTRELLA Bean  (oncoming nurse) by Carmen Fitzgerald RN (offgoing nurse).  Report included the following information SBAR    Shift worked:  7p-7a     Shift summary and any significant changes:    Patient complained of pain, given oxycodone X3 and tylenol X1  Vital signs stable.       Concerns for physician to address:       Zone phone for oncoming shift:   [phone]       Activity:  Level of Assistance: Independent after set-up  Number times ambulated in hallways past shift: 0  Number of times OOB to chair past shift: 0    Cardiac:   Cardiac Monitoring: No      Cardiac Rhythm: Sinus rhythm    Access:  Current line(s): PIV     Genitourinary:   Urinary Status: Voiding, Bathroom privileges    Respiratory:   O2 Device: None (Room air)  Chronic home O2 use?: NO  Incentive spirometer at bedside: YES    GI:  Last BM (including prior to admit): 12/04/24  Current diet:  ADULT DIET; Regular; 3 carb choices (45 gm/meal)  Passing flatus: YES    Pain Management:   Patient states pain is manageable on current regimen: YES    Skin:  Isaiah Scale Score: 21  Interventions: Wound Offloading (Prevention Methods): Pillows, Repositioning    Patient Safety:  Fall Risk: Nursing Judgement-Fall Risk High(Add Comments): No  Fall Risk Interventions  Nursing Judgement-Fall Risk High(Add Comments): No  Toilet Every 2 Hours-In Advance of Need: Yes  Hourly Visual Checks: Awake, In bed  Fall Visual Posted: Socks  Room Door Open: Deferred to promote rest  Alarm On: Bed  Patient Moved Closer to Nursing Station: No    Active Consults:   IP CONSULT TO HOSPITALIST  PHARMACY TO DOSE VANCOMYCIN  IP CONSULT TO PODIATRY  IP CONSULT TO INFECTIOUS DISEASES  IP CONSULT TO VASCULAR SURGERY  IP CONSULT TO DIABETES MANAGEMENT    Length of Stay:  Expected LOS: 10  Actual LOS: 7    Carmen Fitzgerald RN

## 2024-12-07 LAB
ANION GAP SERPL CALC-SCNC: 3 MMOL/L (ref 2–12)
BUN SERPL-MCNC: 14 MG/DL (ref 6–20)
BUN/CREAT SERPL: 20 (ref 12–20)
CALCIUM SERPL-MCNC: 8.4 MG/DL (ref 8.5–10.1)
CHLORIDE SERPL-SCNC: 103 MMOL/L (ref 97–108)
CO2 SERPL-SCNC: 28 MMOL/L (ref 21–32)
CREAT SERPL-MCNC: 0.69 MG/DL (ref 0.7–1.3)
ERYTHROCYTE [DISTWIDTH] IN BLOOD BY AUTOMATED COUNT: 11.8 % (ref 11.5–14.5)
GLUCOSE BLD STRIP.AUTO-MCNC: 199 MG/DL (ref 65–117)
GLUCOSE BLD STRIP.AUTO-MCNC: 221 MG/DL (ref 65–117)
GLUCOSE BLD STRIP.AUTO-MCNC: 246 MG/DL (ref 65–117)
GLUCOSE BLD STRIP.AUTO-MCNC: 257 MG/DL (ref 65–117)
GLUCOSE BLD STRIP.AUTO-MCNC: 314 MG/DL (ref 65–117)
GLUCOSE SERPL-MCNC: 250 MG/DL (ref 65–100)
HCT VFR BLD AUTO: 38.7 % (ref 36.6–50.3)
HGB BLD-MCNC: 13.8 G/DL (ref 12.1–17)
MCH RBC QN AUTO: 31.2 PG (ref 26–34)
MCHC RBC AUTO-ENTMCNC: 35.7 G/DL (ref 30–36.5)
MCV RBC AUTO: 87.4 FL (ref 80–99)
NRBC # BLD: 0 K/UL (ref 0–0.01)
NRBC BLD-RTO: 0 PER 100 WBC
PLATELET # BLD AUTO: 167 K/UL (ref 150–400)
PMV BLD AUTO: 9.6 FL (ref 8.9–12.9)
POTASSIUM SERPL-SCNC: 4.3 MMOL/L (ref 3.5–5.1)
RBC # BLD AUTO: 4.43 M/UL (ref 4.1–5.7)
SERVICE CMNT-IMP: ABNORMAL
SODIUM SERPL-SCNC: 134 MMOL/L (ref 136–145)
WBC # BLD AUTO: 7.4 K/UL (ref 4.1–11.1)

## 2024-12-07 PROCEDURE — 36415 COLL VENOUS BLD VENIPUNCTURE: CPT

## 2024-12-07 PROCEDURE — 2580000003 HC RX 258: Performed by: PODIATRIST

## 2024-12-07 PROCEDURE — 1100000003 HC PRIVATE W/ TELEMETRY

## 2024-12-07 PROCEDURE — 6370000000 HC RX 637 (ALT 250 FOR IP): Performed by: PODIATRIST

## 2024-12-07 PROCEDURE — 82962 GLUCOSE BLOOD TEST: CPT

## 2024-12-07 PROCEDURE — 2580000003 HC RX 258: Performed by: INTERNAL MEDICINE

## 2024-12-07 PROCEDURE — 6370000000 HC RX 637 (ALT 250 FOR IP)

## 2024-12-07 PROCEDURE — 6370000000 HC RX 637 (ALT 250 FOR IP): Performed by: STUDENT IN AN ORGANIZED HEALTH CARE EDUCATION/TRAINING PROGRAM

## 2024-12-07 PROCEDURE — 85027 COMPLETE CBC AUTOMATED: CPT

## 2024-12-07 PROCEDURE — 6360000002 HC RX W HCPCS: Performed by: STUDENT IN AN ORGANIZED HEALTH CARE EDUCATION/TRAINING PROGRAM

## 2024-12-07 PROCEDURE — 6360000002 HC RX W HCPCS: Performed by: INTERNAL MEDICINE

## 2024-12-07 PROCEDURE — 80048 BASIC METABOLIC PNL TOTAL CA: CPT

## 2024-12-07 RX ADMIN — GLIPIZIDE 5 MG: 5 TABLET ORAL at 08:44

## 2024-12-07 RX ADMIN — LISINOPRIL 40 MG: 20 TABLET ORAL at 09:58

## 2024-12-07 RX ADMIN — OXYCODONE 5 MG: 5 TABLET ORAL at 23:07

## 2024-12-07 RX ADMIN — OXYCODONE 5 MG: 5 TABLET ORAL at 13:09

## 2024-12-07 RX ADMIN — INSULIN HUMAN 25 UNITS: 100 INJECTION, SUSPENSION SUBCUTANEOUS at 21:58

## 2024-12-07 RX ADMIN — PIPERACILLIN AND TAZOBACTAM 3375 MG: 3; .375 INJECTION, POWDER, LYOPHILIZED, FOR SOLUTION INTRAVENOUS at 01:30

## 2024-12-07 RX ADMIN — INSULIN LISPRO 2 UNITS: 100 INJECTION, SOLUTION INTRAVENOUS; SUBCUTANEOUS at 18:53

## 2024-12-07 RX ADMIN — OXYCODONE 5 MG: 5 TABLET ORAL at 05:26

## 2024-12-07 RX ADMIN — SODIUM CHLORIDE, PRESERVATIVE FREE 10 ML: 5 INJECTION INTRAVENOUS at 21:59

## 2024-12-07 RX ADMIN — ATORVASTATIN CALCIUM 40 MG: 40 TABLET, FILM COATED ORAL at 21:59

## 2024-12-07 RX ADMIN — FAMOTIDINE 20 MG: 20 TABLET, FILM COATED ORAL at 22:00

## 2024-12-07 RX ADMIN — ENOXAPARIN SODIUM 30 MG: 100 INJECTION SUBCUTANEOUS at 09:58

## 2024-12-07 RX ADMIN — INSULIN LISPRO 2 UNITS: 100 INJECTION, SOLUTION INTRAVENOUS; SUBCUTANEOUS at 12:09

## 2024-12-07 RX ADMIN — INSULIN LISPRO 6 UNITS: 100 INJECTION, SOLUTION INTRAVENOUS; SUBCUTANEOUS at 21:57

## 2024-12-07 RX ADMIN — PIPERACILLIN AND TAZOBACTAM 3375 MG: 3; .375 INJECTION, POWDER, LYOPHILIZED, FOR SOLUTION INTRAVENOUS at 19:09

## 2024-12-07 RX ADMIN — ENOXAPARIN SODIUM 30 MG: 100 INJECTION SUBCUTANEOUS at 21:59

## 2024-12-07 RX ADMIN — ASPIRIN 81 MG: 81 TABLET, CHEWABLE ORAL at 09:58

## 2024-12-07 RX ADMIN — PIPERACILLIN AND TAZOBACTAM 3375 MG: 3; .375 INJECTION, POWDER, LYOPHILIZED, FOR SOLUTION INTRAVENOUS at 10:04

## 2024-12-07 RX ADMIN — OXYCODONE 5 MG: 5 TABLET ORAL at 18:46

## 2024-12-07 RX ADMIN — INSULIN HUMAN 25 UNITS: 100 INJECTION, SUSPENSION SUBCUTANEOUS at 09:58

## 2024-12-07 RX ADMIN — FAMOTIDINE 20 MG: 20 TABLET, FILM COATED ORAL at 09:58

## 2024-12-07 RX ADMIN — GLIPIZIDE 5 MG: 5 TABLET ORAL at 18:47

## 2024-12-07 RX ADMIN — INSULIN LISPRO 2 UNITS: 100 INJECTION, SOLUTION INTRAVENOUS; SUBCUTANEOUS at 08:44

## 2024-12-07 ASSESSMENT — PAIN DESCRIPTION - DESCRIPTORS
DESCRIPTORS: ACHING

## 2024-12-07 ASSESSMENT — PAIN SCALES - GENERAL
PAINLEVEL_OUTOF10: 3
PAINLEVEL_OUTOF10: 4
PAINLEVEL_OUTOF10: 3
PAINLEVEL_OUTOF10: 8
PAINLEVEL_OUTOF10: 5
PAINLEVEL_OUTOF10: 6
PAINLEVEL_OUTOF10: 8

## 2024-12-07 ASSESSMENT — PAIN DESCRIPTION - LOCATION
LOCATION: FOOT

## 2024-12-07 ASSESSMENT — PAIN DESCRIPTION - ORIENTATION
ORIENTATION: RIGHT;LOWER
ORIENTATION: RIGHT

## 2024-12-07 NOTE — PROGRESS NOTES
Physical Therapy  Requesting new orders secondary to updated weight bearing status on R. Received new orders and attempting to see patient for therapy. Patient educated on PWB on R and need for use of RW for ambulation. Patient stating he has been using walker today with limited weight on R foot.  He not currently interested in further gait training at this time.  Agreeable to therapy following back on Monday following wound vac placement.  May benefit from HHPT to ensure safe mobility in home if agreeable.  Thank you,  Bev Abreu, PT

## 2024-12-07 NOTE — PROGRESS NOTES
daily    Medical Decision Making:   I personally reviewed labs: CBC, BMP  I personally reviewed imaging:   I personally reviewed EKG:  Toxic drug monitoring: BG monitoring-toxicity hypoglycemia given sliding scale, creatinine functions as patient is on vancomycin and Zosyn, hypoglycemia  Discussed case with:        Code Status: Full code  DVT Prophylaxis: Lovenox   GI Prophylaxis: Famotidine    Subjective:   Discussed with nursing overnight events.  Awaiting Monday vacuum placement.  Doing well.  No new complaints.    Objective:     VITALS:   Last 24hrs VS reviewed since prior progress note. Most recent are:  Patient Vitals for the past 24 hrs:   BP Temp Temp src Pulse Resp SpO2 Height   12/07/24 0801 (!) 142/88 98.6 °F (37 °C) Oral 82 -- 96 % --   12/07/24 0556 -- -- -- -- 18 -- --   12/07/24 0526 -- -- -- -- 18 -- --   12/07/24 0313 132/73 98.6 °F (37 °C) Oral 80 18 97 % --   12/06/24 2245 -- -- -- -- 18 -- --   12/06/24 2215 -- -- -- -- 18 -- --   12/06/24 2057 -- -- -- -- 18 -- --   12/06/24 1957 128/75 98.8 °F (37.1 °C) Oral 90 17 95 % --   12/06/24 1836 -- -- -- -- 18 -- --   12/06/24 1507 -- -- -- -- -- -- 1.83 m (6' 0.05\")   12/06/24 1456 120/70 98.2 °F (36.8 °C) Oral 80 16 99 % --         Intake/Output Summary (Last 24 hours) at 12/7/2024 1218  Last data filed at 12/7/2024 0556  Gross per 24 hour   Intake 600 ml   Output --   Net 600 ml          I had a face to face encounter and independently examined this patient on 12/7/2024, as outlined below:  PHYSICAL EXAM:  General: Alert, cooperative  EENT:  EOMI. Anicteric sclerae.  Resp:  CTA bilaterally, no wheezing or rales.  No accessory muscle use  CV:  Regular  rhythm,  No edema  GI:  Soft, Non distended, Non tender.  +Bowel sounds  Neurologic:  Alert and oriented X 3, normal speech,   Psych:   Good insight. Not anxious nor agitated  Skin:  Redness on the right shin--improving, improvement in right lower extremity swelling.  Right foot was wrapped in Ace  bandage that was dry clean and intact    Reviewed most current lab test results and cultures  YES  Reviewed most current radiology test results   YES  Review and summation of old records today    NO  Reviewed patient's current orders and MAR    YES  PMH/SH reviewed - no change compared to H&P    Procedures: see electronic medical records for all procedures/Xrays and details which were not copied into this note but were reviewed prior to creation of Plan.      LABS:  I reviewed today's most current labs and imaging studies.  Pertinent labs include:  Recent Labs     12/05/24  0500 12/06/24  0443 12/07/24  0532   WBC 8.1 8.5 7.4   HGB 13.8 14.0 13.8   HCT 39.3 40.0 38.7   * 157 167     Recent Labs     12/05/24  0500 12/06/24  0443 12/07/24  0532    137 134*   K 3.8 3.7 4.3    102 103   CO2 28 30 28   GLUCOSE 261* 162* 250*   BUN 9 10 14   CREATININE 0.65* 0.68* 0.69*   CALCIUM 7.9* 8.2* 8.4*       Signed: Samreen Mcgregor MD

## 2024-12-07 NOTE — PROGRESS NOTES
End of Shift Note    Bedside shift change report given to Joanna DE LA ROSA (oncoming nurse) by Shweta Nicole RN (offgoing nurse).  Report included the following information SBAR, Intake/Output, MAR, and Recent Results    Shift worked:  1028-4840     Shift summary and any significant changes:     Uneventful shift. VS remained stable with no s/s SIRS. Pt tolerating IV zosyn well with no s/s adverse reaction. Medicated pt twice for pain with oxycodone. Pt refusing gripper sock for left foot but is wearing ortho shoe on right foot. Otherwise fall precautions in place and no injuries sustained.      Concerns for physician to address:  N/A     Zone phone for oncoming shift:   [phone]       Activity:  Level of Assistance: Independent  Number times ambulated in hallways past shift: 0  Number of times OOB to chair past shift: 0    Cardiac:   Cardiac Monitoring: No      Cardiac Rhythm: Sinus rhythm    Access:  Current line(s): PIV     Genitourinary:   Urinary Status: Voiding, Bathroom privileges    Respiratory:   O2 Device: None (Room air)  Chronic home O2 use?: NO  Incentive spirometer at bedside: YES    GI:  Last BM (including prior to admit): 12/06/24  Current diet:  ADULT DIET; Regular; 3 carb choices (45 gm/meal)  ADULT ORAL NUTRITION SUPPLEMENT; Breakfast, Lunch; Wound Healing Oral Supplement  Passing flatus: YES    Pain Management:   Patient states pain is manageable on current regimen: YES    Skin:  Isaiah Scale Score: 20  Interventions: Wound Offloading (Prevention Methods): Elevate heels, Bed, pressure reduction mattress, Pillows, Repositioning    Patient Safety:  Fall Risk: Nursing Judgement-Fall Risk High(Add Comments): Yes  Fall Risk Interventions  Nursing Judgement-Fall Risk High(Add Comments): Yes  Toilet Every 2 Hours-In Advance of Need: Yes  Hourly Visual Checks: Awake, In bed, Quiet  Fall Visual Posted: Socks  Room Door Open: Deferred to promote rest  Alarm On: Bed  Patient Moved Closer to Nursing Station:

## 2024-12-08 LAB
ANION GAP SERPL CALC-SCNC: 3 MMOL/L (ref 2–12)
BUN SERPL-MCNC: 11 MG/DL (ref 6–20)
BUN/CREAT SERPL: 16 (ref 12–20)
CALCIUM SERPL-MCNC: 8.4 MG/DL (ref 8.5–10.1)
CHLORIDE SERPL-SCNC: 99 MMOL/L (ref 97–108)
CO2 SERPL-SCNC: 32 MMOL/L (ref 21–32)
CREAT SERPL-MCNC: 0.69 MG/DL (ref 0.7–1.3)
GLUCOSE BLD STRIP.AUTO-MCNC: 164 MG/DL (ref 65–117)
GLUCOSE BLD STRIP.AUTO-MCNC: 213 MG/DL (ref 65–117)
GLUCOSE BLD STRIP.AUTO-MCNC: 230 MG/DL (ref 65–117)
GLUCOSE BLD STRIP.AUTO-MCNC: 238 MG/DL (ref 65–117)
GLUCOSE SERPL-MCNC: 183 MG/DL (ref 65–100)
POTASSIUM SERPL-SCNC: 4 MMOL/L (ref 3.5–5.1)
SERVICE CMNT-IMP: ABNORMAL
SODIUM SERPL-SCNC: 134 MMOL/L (ref 136–145)

## 2024-12-08 PROCEDURE — 1100000003 HC PRIVATE W/ TELEMETRY

## 2024-12-08 PROCEDURE — 6370000000 HC RX 637 (ALT 250 FOR IP)

## 2024-12-08 PROCEDURE — 6370000000 HC RX 637 (ALT 250 FOR IP): Performed by: PODIATRIST

## 2024-12-08 PROCEDURE — 6360000002 HC RX W HCPCS: Performed by: INTERNAL MEDICINE

## 2024-12-08 PROCEDURE — 36415 COLL VENOUS BLD VENIPUNCTURE: CPT

## 2024-12-08 PROCEDURE — 6360000002 HC RX W HCPCS: Performed by: PODIATRIST

## 2024-12-08 PROCEDURE — 6370000000 HC RX 637 (ALT 250 FOR IP): Performed by: STUDENT IN AN ORGANIZED HEALTH CARE EDUCATION/TRAINING PROGRAM

## 2024-12-08 PROCEDURE — 97608 NEG PRS WND THER NDME>50SQCM: CPT

## 2024-12-08 PROCEDURE — 82962 GLUCOSE BLOOD TEST: CPT

## 2024-12-08 PROCEDURE — 80048 BASIC METABOLIC PNL TOTAL CA: CPT

## 2024-12-08 PROCEDURE — 2580000003 HC RX 258: Performed by: INTERNAL MEDICINE

## 2024-12-08 RX ORDER — POLYMYXIN B SULFATE AND TRIMETHOPRIM 1; 10000 MG/ML; [USP'U]/ML
1 SOLUTION OPHTHALMIC EVERY 6 HOURS SCHEDULED
Status: DISCONTINUED | OUTPATIENT
Start: 2024-12-08 | End: 2024-12-12 | Stop reason: HOSPADM

## 2024-12-08 RX ORDER — ENOXAPARIN SODIUM 100 MG/ML
30 INJECTION SUBCUTANEOUS 2 TIMES DAILY
Status: DISCONTINUED | OUTPATIENT
Start: 2024-12-08 | End: 2024-12-12 | Stop reason: HOSPADM

## 2024-12-08 RX ADMIN — ACETAMINOPHEN 325MG 650 MG: 325 TABLET ORAL at 21:04

## 2024-12-08 RX ADMIN — OXYCODONE 5 MG: 5 TABLET ORAL at 03:22

## 2024-12-08 RX ADMIN — GLIPIZIDE 5 MG: 5 TABLET ORAL at 10:32

## 2024-12-08 RX ADMIN — ASPIRIN 81 MG: 81 TABLET, CHEWABLE ORAL at 10:16

## 2024-12-08 RX ADMIN — POLYMYXIN B SULFATE AND TRIMETHOPRIM 1 DROP: 10000; 1 SOLUTION OPHTHALMIC at 16:37

## 2024-12-08 RX ADMIN — PIPERACILLIN AND TAZOBACTAM 3375 MG: 3; .375 INJECTION, POWDER, LYOPHILIZED, FOR SOLUTION INTRAVENOUS at 21:11

## 2024-12-08 RX ADMIN — ENOXAPARIN SODIUM 30 MG: 100 INJECTION SUBCUTANEOUS at 10:33

## 2024-12-08 RX ADMIN — INSULIN HUMAN 25 UNITS: 100 INJECTION, SUSPENSION SUBCUTANEOUS at 21:06

## 2024-12-08 RX ADMIN — INSULIN LISPRO 2 UNITS: 100 INJECTION, SOLUTION INTRAVENOUS; SUBCUTANEOUS at 21:21

## 2024-12-08 RX ADMIN — FAMOTIDINE 20 MG: 20 TABLET, FILM COATED ORAL at 10:16

## 2024-12-08 RX ADMIN — FAMOTIDINE 20 MG: 20 TABLET, FILM COATED ORAL at 21:03

## 2024-12-08 RX ADMIN — INSULIN HUMAN 25 UNITS: 100 INJECTION, SUSPENSION SUBCUTANEOUS at 10:17

## 2024-12-08 RX ADMIN — OXYCODONE 5 MG: 5 TABLET ORAL at 21:04

## 2024-12-08 RX ADMIN — LISINOPRIL 40 MG: 20 TABLET ORAL at 10:16

## 2024-12-08 RX ADMIN — INSULIN LISPRO 2 UNITS: 100 INJECTION, SOLUTION INTRAVENOUS; SUBCUTANEOUS at 14:12

## 2024-12-08 RX ADMIN — PIPERACILLIN AND TAZOBACTAM 3375 MG: 3; .375 INJECTION, POWDER, LYOPHILIZED, FOR SOLUTION INTRAVENOUS at 01:48

## 2024-12-08 RX ADMIN — OXYCODONE 5 MG: 5 TABLET ORAL at 16:52

## 2024-12-08 RX ADMIN — GLIPIZIDE 5 MG: 5 TABLET ORAL at 16:53

## 2024-12-08 RX ADMIN — ENOXAPARIN SODIUM 30 MG: 100 INJECTION SUBCUTANEOUS at 21:06

## 2024-12-08 RX ADMIN — INSULIN LISPRO 2 UNITS: 100 INJECTION, SOLUTION INTRAVENOUS; SUBCUTANEOUS at 17:35

## 2024-12-08 RX ADMIN — ATORVASTATIN CALCIUM 40 MG: 40 TABLET, FILM COATED ORAL at 21:05

## 2024-12-08 RX ADMIN — OXYCODONE 5 MG: 5 TABLET ORAL at 11:55

## 2024-12-08 ASSESSMENT — PAIN SCALES - GENERAL
PAINLEVEL_OUTOF10: 8
PAINLEVEL_OUTOF10: 8
PAINLEVEL_OUTOF10: 4
PAINLEVEL_OUTOF10: 8
PAINLEVEL_OUTOF10: 7
PAINLEVEL_OUTOF10: 5
PAINLEVEL_OUTOF10: 4
PAINLEVEL_OUTOF10: 3

## 2024-12-08 ASSESSMENT — PAIN DESCRIPTION - FREQUENCY
FREQUENCY: INTERMITTENT
FREQUENCY: INTERMITTENT
FREQUENCY: CONTINUOUS
FREQUENCY: CONTINUOUS

## 2024-12-08 ASSESSMENT — PAIN DESCRIPTION - LOCATION
LOCATION: FOOT
LOCATION: FOOT
LOCATION: LEG
LOCATION: FOOT

## 2024-12-08 ASSESSMENT — PAIN DESCRIPTION - ORIENTATION
ORIENTATION: RIGHT;LOWER
ORIENTATION: RIGHT
ORIENTATION: RIGHT

## 2024-12-08 ASSESSMENT — PAIN DESCRIPTION - PAIN TYPE
TYPE: NEUROPATHIC PAIN
TYPE: CHRONIC PAIN;SURGICAL PAIN
TYPE: NEUROPATHIC PAIN
TYPE: SURGICAL PAIN

## 2024-12-08 ASSESSMENT — PAIN DESCRIPTION - ONSET
ONSET: GRADUAL
ONSET: ON-GOING

## 2024-12-08 ASSESSMENT — PAIN DESCRIPTION - DESCRIPTORS
DESCRIPTORS: ACHING
DESCRIPTORS: DULL;ACHING
DESCRIPTORS: ACHING

## 2024-12-08 ASSESSMENT — PAIN - FUNCTIONAL ASSESSMENT
PAIN_FUNCTIONAL_ASSESSMENT: PREVENTS OR INTERFERES SOME ACTIVE ACTIVITIES AND ADLS

## 2024-12-08 NOTE — PROGRESS NOTES
.End of Shift Note    Bedside shift change report given to ESTRELLA Menendez (oncoming nurse) by TIMMY NÚÑEZ LPN (offgoing nurse).  Report included the following information SBAR, Kardex, OR Summary, Procedure Summary, Intake/Output, MAR, and Recent Results    Shift worked:  7-7     Shift summary and any significant changes:     Patient reporting more issues with pain in right foot today, keeping elevated while in bed or recliner. Pulse palpable and foot warm. Patient c/o right irritated eye. Noted eye to be red, no exudate. Treated with antibiotic drops. Patient stated he felt he had accidentally scratched eye. Continues on antibiotic therapy. New IV site right arm.     Concerns for physician to address:  Plan of care, Discharge planning     Zone phone for oncoming shift:   [phone]       Activity:  Level of Assistance: Independent  Number times ambulated in hallways past shift: 0  Number of times OOB to chair past shift: 2    Cardiac:   Cardiac Monitoring: No      Cardiac Rhythm: Sinus rhythm    Access:  Current line(s): PIV     Genitourinary:   Urinary Status: Voiding    Respiratory:   O2 Device: None (Room air)  Chronic home O2 use?: NO  Incentive spirometer at bedside: YES    GI:  Last BM (including prior to admit): 12/07/24  Current diet:  ADULT DIET; Regular; 3 carb choices (45 gm/meal)  ADULT ORAL NUTRITION SUPPLEMENT; Breakfast, Lunch; Wound Healing Oral Supplement  Passing flatus: YES    Pain Management:   Patient states pain is manageable on current regimen: YES    Skin:  Isaiah Scale Score: 20  Interventions: Wound Offloading (Prevention Methods): Bed, pressure reduction mattress, Elevate heels, Repositioning    Patient Safety:  Fall Risk: Nursing Judgement-Fall Risk High(Add Comments): Yes  Fall Risk Interventions  Nursing Judgement-Fall Risk High(Add Comments): Yes  Toilet Every 2 Hours-In Advance of Need: Yes  Hourly Visual Checks: Quiet, In chair, Awake  Fall Visual Posted: Fall sign posted  Room Door

## 2024-12-08 NOTE — PROGRESS NOTES
End of Shift Note    Bedside shift change report given to Joanna DE LA ROSA (oncoming nurse) by Shweta Nicole RN (offgoing nurse).  Report included the following information SBAR, Intake/Output, MAR, and Recent Results    Shift worked:  [id number]     Shift summary and any significant changes:     Uneventful shift. VS remained stable with no s/s SIRS. Pt tolerating IV zosyn well with no s/s adverse reaction. Medicated pt twice for pain with oxycodone. Dressing remained CDI to right foot. Pt kept RLE elevated on 2 pillows while in bed. Fall precautions in place. No injuries sustained.      Concerns for physician to address:  Reinforce weight-bearing precautions RLE     Zone phone for oncoming shift:   [phone]       Activity:  Level of Assistance: Independent  Number times ambulated in hallways past shift: 0  Number of times OOB to chair past shift: 1    Cardiac:   Cardiac Monitoring: No      Cardiac Rhythm: Sinus rhythm    Access:  Current line(s): PIV     Genitourinary:   Urinary Status: Voiding    Respiratory:   O2 Device: None (Room air)  Chronic home O2 use?: YES  Incentive spirometer at bedside: YES    GI:  Last BM (including prior to admit): 12/07/24  Current diet:  ADULT DIET; Regular; 3 carb choices (45 gm/meal)  ADULT ORAL NUTRITION SUPPLEMENT; Breakfast, Lunch; Wound Healing Oral Supplement  Passing flatus: YES    Pain Management:   Patient states pain is manageable on current regimen: YES    Skin:  Isaiah Scale Score: 20  Interventions: Wound Offloading (Prevention Methods): Bed, pressure reduction mattress, Elevate heels, Foam silicone, Repositioning, Pillows    Patient Safety:  Fall Risk: Nursing Judgement-Fall Risk High(Add Comments): Yes  Fall Risk Interventions  Nursing Judgement-Fall Risk High(Add Comments): Yes  Toilet Every 2 Hours-In Advance of Need: Yes  Hourly Visual Checks: In bed, Awake, Quiet  Fall Visual Posted: Fall sign posted  Room Door Open: Deferred to promote rest  Alarm On: Bed  Patient

## 2024-12-08 NOTE — PROGRESS NOTES
.End of Shift Note    Bedside shift change report given to Alli Mauricio (oncoming nurse) by TIMMY NÚÑEZ LPN (offgoing nurse).  Report included the following information SBAR, Kardex, OR Summary, Procedure Summary, Intake/Output, MAR, and Recent Results    Shift worked:  7-7     Shift summary and any significant changes:     Patient out of bed in recliner today, tolerates activity. Right foot warm, dressing intact. Keeps post op shoe on when ambulating. Transfers bed to chair or ambulates for BRP. PT evaluated today. Patient will take pain medication as need for right foot discomfort. Continues on antibiotic therapy.     Concerns for physician to address:  Plan of care     Zone phone for oncoming shift:   [phone]       Activity:  Level of Assistance: Independent  Number times ambulated in hallways past shift: 0  Number of times OOB to chair past shift: 1    Cardiac:   Cardiac Monitoring: No      Cardiac Rhythm: Sinus rhythm    Access:  Current line(s): PIV     Genitourinary:   Urinary Status: Voiding    Respiratory:   O2 Device: None (Room air)  Chronic home O2 use?: No  Incentive spirometer at bedside: YES    GI:  Last BM (including prior to admit): 12/06/24  Current diet:  ADULT DIET; Regular; 3 carb choices (45 gm/meal)  ADULT ORAL NUTRITION SUPPLEMENT; Breakfast, Lunch; Wound Healing Oral Supplement  Passing flatus: YES    Pain Management:   Patient states pain is manageable on current regimen: YES    Skin:  Isaiah Scale Score: 20  Interventions: Wound Offloading (Prevention Methods): Elevate heels, Bed, pressure reduction mattress, Pillows, Repositioning    Patient Safety:  Fall Risk: Nursing Judgement-Fall Risk High(Add Comments): Yes  Fall Risk Interventions  Nursing Judgement-Fall Risk High(Add Comments): Yes  Toilet Every 2 Hours-In Advance of Need: Yes  Hourly Visual Checks: In bed, Awake, Quiet  Fall Visual Posted: Fall sign posted  Room Door Open: Deferred to promote rest  Alarm On: Bed  Patient Moved

## 2024-12-08 NOTE — PROGRESS NOTES
Hospitalist Progress Note    NAME:   Milton Hughes   : 1970   MRN: [id number]     Date/Time: 2024 4:13 PM  Patient PCP: No primary care provider on file.    Estimated discharge date:   Barriers: Vacuum placement    Assessment / Plan:    Sepsis secondary to osteomyelitis of right foot, right lower extremity cellulitis and necrotic toe, resolved  Osteomyelitis of right foot, necrotic toe  Diabetic foot ulcer with gangrene of right fifth toe  Leukocytosis secondary to left lower extremity cellulitis and necrotic toe  Sepsis based on elevated CRP 21.30 initial presentation and leukocytosis, improved    Continue broad-spectrum antibiotic Zosyn and vancomycin  Status post debridement amputation on 2024  Cultures from  are positive for Streptococcus  Status post repeat debridement 2024    Pain control  Tylenol as needed  Oxycodone and Dilaudid  Vascular surgery cleared, excellent toe pressures-recommended continued care with podiatry.  CBC daily  Podiatry on board, await clearance prior to discharge  ID on board, discharge recs: Pathology result d/w Dr. Salazar, podiatry. No OM. Asking pt to discharge home with 10 day course of PO abx.     Poorly controlled diabetes   A1c of 10  - Patient does not take any medication, has not seen his primary care doctor for many years  --Diabetic management consult placed  -- continue sliding scale insulin  -Continue NPH 25 units every 12 hours as recommended by diabetes management-sent to patient's pharmacy to see if this is a more affordable option for him    Hyperlipidemia  - LDL of 93, given patient is high risk with uncontrolled diabetes   --LDL needs to be less than 70 started on Lipitor 40 mg daily     Hypertension  - Given patient is diabetic and renal protective effect of ACE inhibitors,  stopped amlodipine and started on lisinopril 40 mg daily  Given patient is high risk for coronary artery disease recommend baby aspirin 81 mg  daily    Conjunctivitis, right eye  -Polytrim drops 4 times daily x 7 days  - Artificial tears as needed    Medical Decision Making:   I personally reviewed labs: CBC, BMP  I personally reviewed imaging:   I personally reviewed EKG:  Toxic drug monitoring: BG monitoring-toxicity hypoglycemia given sliding scale, creatinine functions as patient is on vancomycin and Zosyn, hypoglycemia  Discussed case with:        Code Status: Full code  DVT Prophylaxis: Lovenox   GI Prophylaxis: Famotidine    Subjective:   Discussed with nursing overnight events.  Complains of poking himself in the eye and now with a painful injected eye.  Agreeable to antibiotic drops.    Objective:     VITALS:   Last 24hrs VS reviewed since prior progress note. Most recent are:  Patient Vitals for the past 24 hrs:   BP Temp Temp src Pulse Resp SpO2   12/08/24 1532 114/74 98.8 °F (37.1 °C) Oral 84 -- 97 %   12/08/24 1155 -- -- -- -- 20 --   12/08/24 0823 122/79 98.4 °F (36.9 °C) Oral 82 -- 97 %   12/08/24 0352 -- -- -- -- 18 --   12/08/24 0349 118/79 99 °F (37.2 °C) Oral 79 17 97 %   12/08/24 0322 -- -- -- -- 18 --   12/07/24 2337 -- -- -- -- 18 --   12/07/24 2307 -- -- -- -- 18 --   12/07/24 1938 (!) 142/82 99.7 °F (37.6 °C) Oral 87 17 97 %   12/07/24 1916 -- -- -- -- 18 --   12/07/24 1846 -- -- -- -- 20 --         Intake/Output Summary (Last 24 hours) at 12/8/2024 1613  Last data filed at 12/8/2024 0548  Gross per 24 hour   Intake 615.83 ml   Output 2 ml   Net 613.83 ml          I had a face to face encounter and independently examined this patient on 12/8/2024, as outlined below:  PHYSICAL EXAM:  General: Alert, cooperative  EENT:  EOMI. Anicteric sclerae.  Resp:  CTA bilaterally, no wheezing or rales.  No accessory muscle use  CV:  Regular  rhythm,  No edema  GI:  Soft, Non distended, Non tender.  +Bowel sounds  Neurologic:  Alert and oriented X 3, normal speech,   Psych:   Good insight. Not anxious nor agitated  Skin:  Redness on the right

## 2024-12-09 LAB
ANION GAP SERPL CALC-SCNC: 4 MMOL/L (ref 2–12)
BUN SERPL-MCNC: 17 MG/DL (ref 6–20)
BUN/CREAT SERPL: 26 (ref 12–20)
CALCIUM SERPL-MCNC: 8.9 MG/DL (ref 8.5–10.1)
CHLORIDE SERPL-SCNC: 102 MMOL/L (ref 97–108)
CO2 SERPL-SCNC: 31 MMOL/L (ref 21–32)
CREAT SERPL-MCNC: 0.66 MG/DL (ref 0.7–1.3)
GLUCOSE BLD STRIP.AUTO-MCNC: 156 MG/DL (ref 65–117)
GLUCOSE BLD STRIP.AUTO-MCNC: 174 MG/DL (ref 65–117)
GLUCOSE BLD STRIP.AUTO-MCNC: 201 MG/DL (ref 65–117)
GLUCOSE BLD STRIP.AUTO-MCNC: 228 MG/DL (ref 65–117)
GLUCOSE SERPL-MCNC: 161 MG/DL (ref 65–100)
LACTATE SERPL-SCNC: 1.2 MMOL/L (ref 0.4–2)
POTASSIUM SERPL-SCNC: 3.8 MMOL/L (ref 3.5–5.1)
SERVICE CMNT-IMP: ABNORMAL
SODIUM SERPL-SCNC: 137 MMOL/L (ref 136–145)

## 2024-12-09 PROCEDURE — 2500000003 HC RX 250 WO HCPCS: Performed by: PODIATRIST

## 2024-12-09 PROCEDURE — 6360000002 HC RX W HCPCS: Performed by: INTERNAL MEDICINE

## 2024-12-09 PROCEDURE — 6370000000 HC RX 637 (ALT 250 FOR IP): Performed by: PODIATRIST

## 2024-12-09 PROCEDURE — 1100000003 HC PRIVATE W/ TELEMETRY

## 2024-12-09 PROCEDURE — 36415 COLL VENOUS BLD VENIPUNCTURE: CPT

## 2024-12-09 PROCEDURE — 82962 GLUCOSE BLOOD TEST: CPT

## 2024-12-09 PROCEDURE — 6370000000 HC RX 637 (ALT 250 FOR IP)

## 2024-12-09 PROCEDURE — 6360000002 HC RX W HCPCS: Performed by: STUDENT IN AN ORGANIZED HEALTH CARE EDUCATION/TRAINING PROGRAM

## 2024-12-09 PROCEDURE — 83605 ASSAY OF LACTIC ACID: CPT

## 2024-12-09 PROCEDURE — 6360000002 HC RX W HCPCS: Performed by: PODIATRIST

## 2024-12-09 PROCEDURE — 80048 BASIC METABOLIC PNL TOTAL CA: CPT

## 2024-12-09 PROCEDURE — 2580000003 HC RX 258: Performed by: INTERNAL MEDICINE

## 2024-12-09 PROCEDURE — 6370000000 HC RX 637 (ALT 250 FOR IP): Performed by: STUDENT IN AN ORGANIZED HEALTH CARE EDUCATION/TRAINING PROGRAM

## 2024-12-09 RX ADMIN — OXYCODONE 5 MG: 5 TABLET ORAL at 00:16

## 2024-12-09 RX ADMIN — POLYMYXIN B SULFATE AND TRIMETHOPRIM 1 DROP: 10000; 1 SOLUTION OPHTHALMIC at 17:54

## 2024-12-09 RX ADMIN — HYDROMORPHONE HYDROCHLORIDE 0.5 MG: 1 INJECTION, SOLUTION INTRAMUSCULAR; INTRAVENOUS; SUBCUTANEOUS at 11:37

## 2024-12-09 RX ADMIN — ATORVASTATIN CALCIUM 40 MG: 40 TABLET, FILM COATED ORAL at 20:56

## 2024-12-09 RX ADMIN — OXYCODONE 5 MG: 5 TABLET ORAL at 18:42

## 2024-12-09 RX ADMIN — OXYCODONE 5 MG: 5 TABLET ORAL at 14:19

## 2024-12-09 RX ADMIN — GLIPIZIDE 5 MG: 5 TABLET ORAL at 17:53

## 2024-12-09 RX ADMIN — HYDROMORPHONE HYDROCHLORIDE 0.5 MG: 1 INJECTION, SOLUTION INTRAMUSCULAR; INTRAVENOUS; SUBCUTANEOUS at 15:43

## 2024-12-09 RX ADMIN — INSULIN LISPRO 2 UNITS: 100 INJECTION, SOLUTION INTRAVENOUS; SUBCUTANEOUS at 20:55

## 2024-12-09 RX ADMIN — OXYCODONE 5 MG: 5 TABLET ORAL at 05:04

## 2024-12-09 RX ADMIN — OXYCODONE 5 MG: 5 TABLET ORAL at 23:08

## 2024-12-09 RX ADMIN — GLIPIZIDE 5 MG: 5 TABLET ORAL at 06:26

## 2024-12-09 RX ADMIN — HYDROMORPHONE HYDROCHLORIDE 0.5 MG: 1 INJECTION, SOLUTION INTRAMUSCULAR; INTRAVENOUS; SUBCUTANEOUS at 21:06

## 2024-12-09 RX ADMIN — INSULIN HUMAN 25 UNITS: 100 INJECTION, SUSPENSION SUBCUTANEOUS at 09:39

## 2024-12-09 RX ADMIN — INSULIN HUMAN 25 UNITS: 100 INJECTION, SUSPENSION SUBCUTANEOUS at 20:55

## 2024-12-09 RX ADMIN — FAMOTIDINE 20 MG: 20 TABLET, FILM COATED ORAL at 09:38

## 2024-12-09 RX ADMIN — POLYMYXIN B SULFATE AND TRIMETHOPRIM 1 DROP: 10000; 1 SOLUTION OPHTHALMIC at 11:40

## 2024-12-09 RX ADMIN — POLYMYXIN B SULFATE AND TRIMETHOPRIM 1 DROP: 10000; 1 SOLUTION OPHTHALMIC at 00:12

## 2024-12-09 RX ADMIN — ASPIRIN 81 MG: 81 TABLET, CHEWABLE ORAL at 09:39

## 2024-12-09 RX ADMIN — POLYMYXIN B SULFATE AND TRIMETHOPRIM 1 DROP: 10000; 1 SOLUTION OPHTHALMIC at 05:12

## 2024-12-09 RX ADMIN — PIPERACILLIN AND TAZOBACTAM 3375 MG: 3; .375 INJECTION, POWDER, LYOPHILIZED, FOR SOLUTION INTRAVENOUS at 05:04

## 2024-12-09 RX ADMIN — INSULIN LISPRO 2 UNITS: 100 INJECTION, SOLUTION INTRAVENOUS; SUBCUTANEOUS at 13:07

## 2024-12-09 RX ADMIN — ENOXAPARIN SODIUM 30 MG: 100 INJECTION SUBCUTANEOUS at 09:39

## 2024-12-09 RX ADMIN — FAMOTIDINE 20 MG: 20 TABLET, FILM COATED ORAL at 20:56

## 2024-12-09 RX ADMIN — PIPERACILLIN AND TAZOBACTAM 3375 MG: 3; .375 INJECTION, POWDER, LYOPHILIZED, FOR SOLUTION INTRAVENOUS at 13:10

## 2024-12-09 RX ADMIN — OXYCODONE 5 MG: 5 TABLET ORAL at 09:38

## 2024-12-09 RX ADMIN — POLYMYXIN B SULFATE AND TRIMETHOPRIM 1 DROP: 10000; 1 SOLUTION OPHTHALMIC at 23:09

## 2024-12-09 RX ADMIN — PIPERACILLIN AND TAZOBACTAM 3375 MG: 3; .375 INJECTION, POWDER, LYOPHILIZED, FOR SOLUTION INTRAVENOUS at 20:55

## 2024-12-09 RX ADMIN — ENOXAPARIN SODIUM 30 MG: 100 INJECTION SUBCUTANEOUS at 20:55

## 2024-12-09 ASSESSMENT — PAIN DESCRIPTION - DESCRIPTORS
DESCRIPTORS: ACHING
DESCRIPTORS: THROBBING
DESCRIPTORS: ACHING
DESCRIPTORS: ACHING;DISCOMFORT

## 2024-12-09 ASSESSMENT — PAIN SCALES - GENERAL
PAINLEVEL_OUTOF10: 8
PAINLEVEL_OUTOF10: 7
PAINLEVEL_OUTOF10: 7
PAINLEVEL_OUTOF10: 9
PAINLEVEL_OUTOF10: 0
PAINLEVEL_OUTOF10: 3
PAINLEVEL_OUTOF10: 7
PAINLEVEL_OUTOF10: 8
PAINLEVEL_OUTOF10: 3
PAINLEVEL_OUTOF10: 8
PAINLEVEL_OUTOF10: 7
PAINLEVEL_OUTOF10: 7
PAINLEVEL_OUTOF10: 0
PAINLEVEL_OUTOF10: 4

## 2024-12-09 ASSESSMENT — PAIN DESCRIPTION - LOCATION
LOCATION: LEG
LOCATION: LEG
LOCATION: FOOT

## 2024-12-09 ASSESSMENT — PAIN DESCRIPTION - ORIENTATION
ORIENTATION: RIGHT

## 2024-12-09 NOTE — PROGRESS NOTES
.End of Shift Note    Bedside shift change report given to ESTRELLA Harry (oncoming nurse) by Gemma Tatum RN (offgoing nurse).  Report included the following information SBAR, Kardex, OR Summary, Procedure Summary, Intake/Output, MAR, and Recent Results    Shift worked: Nights     Shift summary and any significant changes:    The patient with a right foot infection had a stable shift with no complications. IV Zosyn was well tolerated, and pain was managed with oxycodone and Tylenol.  25 units of NPH insulin were given, and blood sugar was slightly elevated, requiring 2 units of regular insulin.  The right foot dressing remained clean, dry, and intact, and the patient kept the right leg elevated on two pillows.  The patient is independent, using an orthopedic shoe to ambulate. Voiding without issues. Lat BM was on 12/8/24. No continuous fluids were given. The patient is asking what time of the day the wound care nurse changes the dressing on Monday.   Concerns for physician to address: Wound care     Zone phone for oncoming shift:   [phone]       Activity:  Level of Assistance: Independent  Number times ambulated in hallways past shift: 0  Number of times OOB to chair past shift: 2    Cardiac:   Cardiac Monitoring: No      Cardiac Rhythm: Sinus rhythm    Access:  Current line(s): PIV     Genitourinary:   Urinary Status: Voiding, Bathroom privileges    Respiratory:   O2 Device: None (Room air)  Chronic home O2 use?: NO  Incentive spirometer at bedside: YES    GI:  Last BM (including prior to admit): 12/08/24  Current diet:  ADULT DIET; Regular; 3 carb choices (45 gm/meal)  ADULT ORAL NUTRITION SUPPLEMENT; Breakfast, Lunch; Wound Healing Oral Supplement  Passing flatus: YES    Pain Management:   Patient states pain is manageable on current regimen: YES    Skin:  Isaiah Scale Score: 20  Interventions: Wound Offloading (Prevention Methods): Bed, pressure reduction mattress, Pillows, Repositioning    Patient Safety:  Fall

## 2024-12-09 NOTE — PROGRESS NOTES
Physical Therapy  Chart reviewed and attempting to see patient for PT this pm. Patient reporting he does not have any further therapy needs. He reports that he is ambulating using RW without difficulty and able to maintain limited weight bearing through R foot.   Will sign off per patient request.  Will need RW for home. CM aware.    Bev Abreu, PT

## 2024-12-09 NOTE — CARE COORDINATION
Transition of Care Plan:     RUR: 7%  Prior Level of Functioning:   Disposition: Home w/family  LISA:   If SNF or IPR: Date FOC offered:   Date FOC received:   Accepting facility:   Date authorization started with reference number:   Date authorization received and expires:   Follow up appointments:   DME needed: wound vac  Transportation at discharge: significant other  IM/IMM Medicare/ letter given: n/a  Is patient a  and connected with VA?               If yes, was [facility] transfer form completed and VA notified?   Caregiver Contact: Lou Vallecillo [phone]  Discharge Caregiver contacted prior to discharge?   Care Conference needed?   Barriers to discharge: Medicaid pending    Patient had wound vac placed today.  [employer] will email rosina application.  CM will continue to follow.    Kristen Hardwick  Ext [phone]

## 2024-12-09 NOTE — WOUND CARE
Wound care follow up for the Necrotizing wound post op day 5 and for re-evaluation of a Wound Vac for this wound. Chart reviewed and patient assessed.   Assessment: pt. Is alert and oriented x 4. He is eating fairly well while he has been here.   The wound is moist with pink tissue showing through the skin grafts. Staples are still intact. Drainage is serosanguinous and some Betadine coloring.   TORIE within normal limits.        Right Foot lateral amputaion site:   8.3 x 9 x 1.2cm      Plantar / lateral aspect:       Treatment today:  Soaked the foot wound is Vashe solution soaked gauze. This loosened the mepitel one. The Wound Vac sponge was applied over a new layer of the Mepitel One (this protects the Grafts on the foot). The black Granufoam was applied to the silicone layer and occlusively sealed with the vac drape. The Trac pad is applied to the dorsal aspect of the foot. Suction applied at 125 mmHg.    Plan: Pt. Has applied for Medicaid due to no insurance so case management will apply for a Kaci wound vac. He will also need a Wound Vac dressing change plan. He will not be able to get Home Care without some kind of insurance.   Alerted Dr. Salazar of this and he will be in touch with  with a plan.   The Vac application was filled out. This will NOT be a Ready Vac unless approved by [facility].  Will change the dressin on Wed or Thursday.     Marcy Gallardo RN, BSN, CWON

## 2024-12-09 NOTE — PROGRESS NOTES
Hospitalist Progress Note    NAME:   Milton Hughes   : 1970   MRN: [id number]     Date/Time: 2024 12:44 PM  Patient PCP: No primary care provider on file.    Estimated discharge date: Medically stable for DC  Barriers: Social situation/insurance/vacuum    Assessment / Plan:    Sepsis secondary to osteomyelitis of right foot, right lower extremity cellulitis and necrotic toe, resolved  Osteomyelitis of right foot, necrotic toe  Diabetic foot ulcer with gangrene of right fifth toe  Leukocytosis secondary to left lower extremity cellulitis and necrotic toe  Sepsis based on elevated CRP 21.30 initial presentation and leukocytosis, improved    Continue broad-spectrum antibiotic Zosyn and vancomycin  Status post debridement amputation on 2024  Cultures from  are positive for Streptococcus  Status post repeat debridement 2024    Pain control  Tylenol as needed  Oxycodone and Dilaudid  Vascular surgery cleared, excellent toe pressures-recommended continued care with podiatry.  CBC daily  Podiatry on board, await clearance prior to discharge  ID on board, discharge recs: Pathology result d/w Dr. Salazar, podiatry. No OM. Asking pt to discharge home with 10 day course of PO abx.  Last day of abx -will leave on IV while in house  Wound vac per wound care, CM working on [facility]    Poorly controlled diabetes   A1c of 10  - Patient does not take any medication, has not seen his primary care doctor for many years  --Diabetic management consult placed  -- continue sliding scale insulin  -Continue NPH 25 units every 12 hours as recommended by diabetes management-sent to patient's pharmacy to see if this is a more affordable option for him    Hyperlipidemia  - LDL of 93, given patient is high risk with uncontrolled diabetes   --LDL needs to be less than 70 started on Lipitor 40 mg daily     Hypertension  - Given patient is diabetic and renal protective effect of ACE inhibitors,  stopped  edema  GI:  Soft, Non distended, Non tender.  +Bowel sounds  Neurologic:  Alert and oriented X 3, normal speech,   Psych:   Good insight. Not anxious nor agitated  Skin:  Redness on the right shin--improving, improvement in right lower extremity swelling.  Right foot was wrapped in Ace bandage that was dry clean and intact    Reviewed most current lab test results and cultures  YES  Reviewed most current radiology test results   YES  Review and summation of old records today    NO  Reviewed patient's current orders and MAR    YES  PMH/SH reviewed - no change compared to H&P    Procedures: see electronic medical records for all procedures/Xrays and details which were not copied into this note but were reviewed prior to creation of Plan.      LABS:  I reviewed today's most current labs and imaging studies.  Pertinent labs include:  Recent Labs     12/07/24  0532   WBC 7.4   HGB 13.8   HCT 38.7        Recent Labs     12/07/24  0532 12/08/24  0600 12/09/24  0433   * 134* 137   K 4.3 4.0 3.8    99 102   CO2 28 32 31   GLUCOSE 250* 183* 161*   BUN 14 11 17   CREATININE 0.69* 0.69* 0.66*   CALCIUM 8.4* 8.4* 8.9       Signed: Samreen Mcgregor MD

## 2024-12-09 NOTE — PLAN OF CARE
Problem: Chronic Conditions and Co-morbidities  Goal: Patient's chronic conditions and co-morbidity symptoms are monitored and maintained or improved  12/8/2024 2203 by Gemma Tatum RN  Outcome: Progressing  12/8/2024 0959 by Shweta Nicole RN  Outcome: Progressing     Problem: Discharge Planning  Goal: Discharge to home or other facility with appropriate resources  12/8/2024 2203 by Gemma Tatum RN  Outcome: Progressing  12/8/2024 0959 by Shweta Nicole RN  Outcome: Progressing     Problem: Pain  Goal: Verbalizes/displays adequate comfort level or baseline comfort level  12/8/2024 2203 by Gemma Tatum RN  Outcome: Progressing  12/8/2024 0959 by Shweta Nicole RN  Outcome: Progressing     Problem: Skin/Tissue Integrity - Adult  Goal: Incisions, wounds, or drain sites healing without S/S of infection  12/8/2024 2203 by Gemma Tatum RN  Outcome: Progressing  12/8/2024 0959 by Shweta Nicole RN  Outcome: Progressing  Flowsheets (Taken 12/8/2024 0823 by Joanna Briseno LPN)  Incisions, Wounds, or Drain Sites Healing Without Sign and Symptoms of Infection: TWICE DAILY: Assess and document skin integrity     Problem: Musculoskeletal - Adult  Goal: Return mobility to safest level of function  12/8/2024 2203 by Gemma Tatum RN  Outcome: Progressing  12/8/2024 0959 by Shweta Nicole RN  Outcome: Progressing     Problem: Safety - Adult  Goal: Free from fall injury  12/8/2024 2203 by Gemma Tatum RN  Outcome: Progressing  12/8/2024 0959 by Shweta Nicloe RN  Outcome: Progressing     Problem: Skin/Tissue Integrity  Goal: Absence of new skin breakdown  Description: 1.  Monitor for areas of redness and/or skin breakdown  2.  Assess vascular access sites hourly  3.  Every 4-6 hours minimum:  Change oxygen saturation probe site  4.  Every 4-6 hours:  If on nasal continuous positive airway pressure, respiratory therapy assess nares and determine need for appliance change or resting period.  12/8/2024  2203 by Gemma Tatum RN  Outcome: Progressing  12/8/2024 0959 by Shweta Nicole RN  Outcome: Progressing     Problem: Nutrition Deficit:  Goal: Optimize nutritional status  12/8/2024 2203 by Gemma Tatum RN  Outcome: Progressing  12/8/2024 0959 by Shweta Nicole RN  Outcome: Progressing

## 2024-12-10 LAB
ANION GAP SERPL CALC-SCNC: 3 MMOL/L (ref 2–12)
BUN SERPL-MCNC: 17 MG/DL (ref 6–20)
BUN/CREAT SERPL: 25 (ref 12–20)
CALCIUM SERPL-MCNC: 8.9 MG/DL (ref 8.5–10.1)
CHLORIDE SERPL-SCNC: 99 MMOL/L (ref 97–108)
CO2 SERPL-SCNC: 31 MMOL/L (ref 21–32)
CREAT SERPL-MCNC: 0.68 MG/DL (ref 0.7–1.3)
ERYTHROCYTE [DISTWIDTH] IN BLOOD BY AUTOMATED COUNT: 11.6 % (ref 11.5–14.5)
GLUCOSE BLD STRIP.AUTO-MCNC: 156 MG/DL (ref 65–117)
GLUCOSE BLD STRIP.AUTO-MCNC: 164 MG/DL (ref 65–117)
GLUCOSE BLD STRIP.AUTO-MCNC: 167 MG/DL (ref 65–117)
GLUCOSE BLD STRIP.AUTO-MCNC: 175 MG/DL (ref 65–117)
GLUCOSE BLD STRIP.AUTO-MCNC: 208 MG/DL (ref 65–117)
GLUCOSE SERPL-MCNC: 145 MG/DL (ref 65–100)
HCT VFR BLD AUTO: 37.7 % (ref 36.6–50.3)
HGB BLD-MCNC: 13.3 G/DL (ref 12.1–17)
MCH RBC QN AUTO: 30.9 PG (ref 26–34)
MCHC RBC AUTO-ENTMCNC: 35.3 G/DL (ref 30–36.5)
MCV RBC AUTO: 87.5 FL (ref 80–99)
NRBC # BLD: 0 K/UL (ref 0–0.01)
NRBC BLD-RTO: 0 PER 100 WBC
PLATELET # BLD AUTO: 188 K/UL (ref 150–400)
PMV BLD AUTO: 9.6 FL (ref 8.9–12.9)
POTASSIUM SERPL-SCNC: 4 MMOL/L (ref 3.5–5.1)
RBC # BLD AUTO: 4.31 M/UL (ref 4.1–5.7)
SERVICE CMNT-IMP: ABNORMAL
SODIUM SERPL-SCNC: 133 MMOL/L (ref 136–145)
WBC # BLD AUTO: 8.2 K/UL (ref 4.1–11.1)

## 2024-12-10 PROCEDURE — 85027 COMPLETE CBC AUTOMATED: CPT

## 2024-12-10 PROCEDURE — 6370000000 HC RX 637 (ALT 250 FOR IP): Performed by: PODIATRIST

## 2024-12-10 PROCEDURE — 6370000000 HC RX 637 (ALT 250 FOR IP): Performed by: STUDENT IN AN ORGANIZED HEALTH CARE EDUCATION/TRAINING PROGRAM

## 2024-12-10 PROCEDURE — 6360000002 HC RX W HCPCS: Performed by: INTERNAL MEDICINE

## 2024-12-10 PROCEDURE — 1100000003 HC PRIVATE W/ TELEMETRY

## 2024-12-10 PROCEDURE — 2580000003 HC RX 258: Performed by: INTERNAL MEDICINE

## 2024-12-10 PROCEDURE — 6360000002 HC RX W HCPCS: Performed by: PODIATRIST

## 2024-12-10 PROCEDURE — 99231 SBSQ HOSP IP/OBS SF/LOW 25: CPT

## 2024-12-10 PROCEDURE — 6370000000 HC RX 637 (ALT 250 FOR IP)

## 2024-12-10 PROCEDURE — 80048 BASIC METABOLIC PNL TOTAL CA: CPT

## 2024-12-10 PROCEDURE — 82962 GLUCOSE BLOOD TEST: CPT

## 2024-12-10 PROCEDURE — 36415 COLL VENOUS BLD VENIPUNCTURE: CPT

## 2024-12-10 PROCEDURE — 6360000002 HC RX W HCPCS: Performed by: STUDENT IN AN ORGANIZED HEALTH CARE EDUCATION/TRAINING PROGRAM

## 2024-12-10 RX ADMIN — FAMOTIDINE 20 MG: 20 TABLET, FILM COATED ORAL at 09:04

## 2024-12-10 RX ADMIN — POLYMYXIN B SULFATE AND TRIMETHOPRIM 1 DROP: 10000; 1 SOLUTION OPHTHALMIC at 04:35

## 2024-12-10 RX ADMIN — HYDROMORPHONE HYDROCHLORIDE 0.5 MG: 1 INJECTION, SOLUTION INTRAMUSCULAR; INTRAVENOUS; SUBCUTANEOUS at 09:16

## 2024-12-10 RX ADMIN — ACETAMINOPHEN 325MG 650 MG: 325 TABLET ORAL at 21:20

## 2024-12-10 RX ADMIN — ATORVASTATIN CALCIUM 40 MG: 40 TABLET, FILM COATED ORAL at 21:15

## 2024-12-10 RX ADMIN — PIPERACILLIN AND TAZOBACTAM 3375 MG: 3; .375 INJECTION, POWDER, LYOPHILIZED, FOR SOLUTION INTRAVENOUS at 22:07

## 2024-12-10 RX ADMIN — FAMOTIDINE 20 MG: 20 TABLET, FILM COATED ORAL at 21:15

## 2024-12-10 RX ADMIN — GLIPIZIDE 5 MG: 5 TABLET ORAL at 16:13

## 2024-12-10 RX ADMIN — HYDROMORPHONE HYDROCHLORIDE 0.5 MG: 1 INJECTION, SOLUTION INTRAMUSCULAR; INTRAVENOUS; SUBCUTANEOUS at 02:37

## 2024-12-10 RX ADMIN — OXYCODONE 5 MG: 5 TABLET ORAL at 04:34

## 2024-12-10 RX ADMIN — POLYMYXIN B SULFATE AND TRIMETHOPRIM 1 DROP: 10000; 1 SOLUTION OPHTHALMIC at 16:14

## 2024-12-10 RX ADMIN — ASPIRIN 81 MG: 81 TABLET, CHEWABLE ORAL at 09:04

## 2024-12-10 RX ADMIN — OXYCODONE 5 MG: 5 TABLET ORAL at 13:39

## 2024-12-10 RX ADMIN — POLYMYXIN B SULFATE AND TRIMETHOPRIM 1 DROP: 10000; 1 SOLUTION OPHTHALMIC at 22:08

## 2024-12-10 RX ADMIN — INSULIN HUMAN 25 UNITS: 100 INJECTION, SUSPENSION SUBCUTANEOUS at 09:08

## 2024-12-10 RX ADMIN — GLIPIZIDE 5 MG: 5 TABLET ORAL at 09:04

## 2024-12-10 RX ADMIN — ENOXAPARIN SODIUM 30 MG: 100 INJECTION SUBCUTANEOUS at 21:15

## 2024-12-10 RX ADMIN — INSULIN LISPRO 2 UNITS: 100 INJECTION, SOLUTION INTRAVENOUS; SUBCUTANEOUS at 13:33

## 2024-12-10 RX ADMIN — LISINOPRIL 40 MG: 20 TABLET ORAL at 09:03

## 2024-12-10 RX ADMIN — PIPERACILLIN AND TAZOBACTAM 3375 MG: 3; .375 INJECTION, POWDER, LYOPHILIZED, FOR SOLUTION INTRAVENOUS at 14:33

## 2024-12-10 RX ADMIN — HYDROMORPHONE HYDROCHLORIDE 0.5 MG: 1 INJECTION, SOLUTION INTRAMUSCULAR; INTRAVENOUS; SUBCUTANEOUS at 18:40

## 2024-12-10 RX ADMIN — POLYMYXIN B SULFATE AND TRIMETHOPRIM 1 DROP: 10000; 1 SOLUTION OPHTHALMIC at 10:08

## 2024-12-10 RX ADMIN — INSULIN HUMAN 25 UNITS: 100 INJECTION, SUSPENSION SUBCUTANEOUS at 21:15

## 2024-12-10 RX ADMIN — ENOXAPARIN SODIUM 30 MG: 100 INJECTION SUBCUTANEOUS at 09:05

## 2024-12-10 RX ADMIN — PIPERACILLIN AND TAZOBACTAM 3375 MG: 3; .375 INJECTION, POWDER, LYOPHILIZED, FOR SOLUTION INTRAVENOUS at 04:35

## 2024-12-10 RX ADMIN — ACETAMINOPHEN 325MG 650 MG: 325 TABLET ORAL at 13:40

## 2024-12-10 ASSESSMENT — PAIN DESCRIPTION - LOCATION
LOCATION: FOOT

## 2024-12-10 ASSESSMENT — PAIN DESCRIPTION - DESCRIPTORS
DESCRIPTORS: THROBBING
DESCRIPTORS: ACHING;DISCOMFORT

## 2024-12-10 ASSESSMENT — PAIN SCALES - GENERAL
PAINLEVEL_OUTOF10: 3
PAINLEVEL_OUTOF10: 7
PAINLEVEL_OUTOF10: 8
PAINLEVEL_OUTOF10: 7
PAINLEVEL_OUTOF10: 8
PAINLEVEL_OUTOF10: 7

## 2024-12-10 ASSESSMENT — PAIN DESCRIPTION - ORIENTATION
ORIENTATION: RIGHT

## 2024-12-10 ASSESSMENT — PAIN - FUNCTIONAL ASSESSMENT
PAIN_FUNCTIONAL_ASSESSMENT: ACTIVITIES ARE NOT PREVENTED
PAIN_FUNCTIONAL_ASSESSMENT: PREVENTS OR INTERFERES SOME ACTIVE ACTIVITIES AND ADLS
PAIN_FUNCTIONAL_ASSESSMENT: ACTIVITIES ARE NOT PREVENTED

## 2024-12-10 NOTE — PROGRESS NOTES
Nutrition Note    Spoke with patient re: trying to order 2 milks with cheeseburger on 3 CHO diet. Pt reported he is not eating the bread and is ok with hamburger lexi +cheese and two milks. RD offered to put in dinner order for pt, pt reported he would order dinner himself. Updated diet office.    Electronically signed by Ai Rivers RD on 12/10/24 at 3:25 PM EST    Contact: [phone]

## 2024-12-10 NOTE — PROGRESS NOTES
Will need long term outpatient wound care   Waiting for the CM to arrange for the outpatient post discharge care

## 2024-12-10 NOTE — PROGRESS NOTES
End of Shift Note    Bedside shift change report given to Regine (oncoming nurse) by Mariah Palacios RN (offgoing nurse).  Report included the following information SBAR, Kardex, MAR, and Recent Results    Shift worked:  7p-7a     Shift summary and any significant changes:     uneventful     Concerns for physician to address:  Transition to PO pain meds , maybe increase oxy to 10mg and d/c IV pain meds  To ensure adequate pain control prior to d/c    Zone phone for oncoming shift:          Activity:  Level of Assistance: Independent  Number times ambulated in hallways past shift:   Number of times OOB to chair past shift:     Cardiac:   Cardiac Monitoring:       Cardiac Rhythm: Sinus rhythm    Access:  Current line(s):     Genitourinary:   Urinary Status: Bathroom privileges, Voiding    Respiratory:   O2 Device: None (Room air)  Chronic home O2 use?:   Incentive spirometer at bedside:     GI:  Last BM (including prior to admit): 12/09/24  Current diet:  ADULT DIET; Regular; 3 carb choices (45 gm/meal)  ADULT ORAL NUTRITION SUPPLEMENT; Breakfast, Lunch; Wound Healing Oral Supplement  Passing flatus:     Pain Management:   Patient states pain is manageable on current regimen:     Skin:  Isaiah Scale Score: 23  Interventions: Wound Offloading (Prevention Methods): Elevate heels, Pillows, Repositioning, Turning    Patient Safety:  Fall Risk: Nursing Judgement-Fall Risk High(Add Comments): No  Fall Risk Interventions  Nursing Judgement-Fall Risk High(Add Comments): No  Toilet Every 2 Hours-In Advance of Need: No (Comment) (independent)  Hourly Visual Checks: Awake, In bed  Fall Visual Posted: Socks  Room Door Open: Deferred to promote rest  Alarm On: Bed  Patient Moved Closer to Nursing Station: No    Active Consults:   IP CONSULT TO HOSPITALIST  PHARMACY TO DOSE VANCOMYCIN  IP CONSULT TO PODIATRY  IP CONSULT TO INFECTIOUS DISEASES  IP CONSULT TO VASCULAR SURGERY  IP CONSULT TO DIABETES MANAGEMENT  IP CONSULT TO CASE

## 2024-12-10 NOTE — CARE COORDINATION
Transition of Care Plan:     RUR: 7%  Prior Level of Functioning:   Disposition: Home w/family  LISA:   If SNF or IPR: Date FOC offered:   Date FOC received:   Accepting facility:   Date authorization started with reference number:   Date authorization received and expires:   Follow up appointments:   DME needed: wound vac  Transportation at discharge: significant other  IM/IMM Medicare/ letter given: n/a  Is patient a  and connected with VA?               If yes, was  transfer form completed and VA notified?   Caregiver Contact: Lou Vallecillo [phone]  Discharge Caregiver contacted prior to discharge?   Care Conference needed?   Barriers to discharge: Medicaid pending    CM faxed rosina wound vac request to [facility].  CM will continue to follow.    Kristen Hardwick  Ext [phone]

## 2024-12-10 NOTE — DIABETES MGMT
[facility]  DIABETES MANAGEMENT CONSULT    Consulted by Provider for advanced nursing evaluation and care for inpatient blood glucose management.    Evaluation and Action Plan   Milton Hughes is a 54 year old male patient with a hx of Type 2 DM. His current A1c is 10.3% which demonstrates uncontrolled blood sugars prior to admission. The patient admitted after experiencing fevers and right foot pain, swelling and discoloration.   The patent reports that he took Metformin in the past but did not tolerate well. He has not taking any medication for diabetes in years. The patient is amenable to using insulin at discharge. He does not have medical insurance, so Relion bran NPH discussed. The patient reports that he could afford the 25-26 dollars per vial. Insulin switched to NPH and dosing adjusted.   Bg's are much improved. Insulin pen administration with fake skin model demonstrated. Also vial and insulin syringe administration demonstrated. Survival skills discussed. Handouts provided.   BG's stabilizing. Continue the current insulin dosing for now.   BG's improving . Continue the current diabetes diabetes regimen.  Bg's continue to improve. The patient is pending Medicaid. For now he is self pay. He will be able to afford  Relion NPH insulin at [facility]. Also glipizide is on the 4 dollar medication list. No further changes recommended.     Blood glucose pattern          Management Rationale Action Plan   Medication   Basal needs Using 0.5 units/kg/D based on weight  Use 25 units NPH Q 12 hours   Corrective insulin Using medium dose sensitivity based on weight    Additional orders  Carb consistent diet (60g CHO/meal)  Add glipizide with breakfast and dinner       Diabetes Discharge Plan   Medication  Use 25 units NPH  q 12 hours( Relion brand at [facility]) $25.88 per vial  Use glipizide 5 mg twice daily before meals   Will need insulin syringes and glucometer kit and supplies ( Relion Brand  that you need to know more about how to stay healthy with diabetes    Overall evaluation:    [x] Not achieving A1c target with drug therapy & self-care practices    Subjective   ”Waiting on infectious disease\"      Objective   Physical exam  General Overweight  male in no acute distress. Conversant and cooperative  Neuro  Alert, oriented   Vital Signs   Vitals:    12/10/24 1339   BP:    Pulse:    Resp: 16   Temp:    SpO2:          Laboratory  Recent Labs     12/10/24  0457   WBC 8.2   HGB 13.3   HCT 37.7   MCV 87.5        Recent Labs     12/08/24  0600 12/09/24  0433 12/10/24  0457   * 137 133*   K 4.0 3.8 4.0   CL 99 102 99   CO2 32 31 31   BUN 11 17 17   CREATININE 0.69* 0.66* 0.68*     Lab Results   Component Value Date    ALT 64 11/29/2024    AST 32 11/29/2024    ALKPHOS 131 (H) 11/29/2024    BILITOT 2.5 (H) 11/29/2024     No results found for: \"TSH\", \"TSHFT4\", \"TSHELE\", \"SGN0BUC\", \"TSHHS\"      Factors impacting BG management  Factor Dose Comments   Nutrition:  Standard meals   60 grams/meal    Drugs:  Vasopressor load  Steroids  Epogen  Blood transfusion(s)  Atypical antipsychotics      Affects insulin delivery  Impairs insulin action  A1cs inaccurate  A1cs inaccurate  Weight gain increases insulin resistance   Pain     Infection     Kidney function     Liver function           Assessment and Nursing Intervention   Nursing Diagnosis Risk for unstable blood glucose pattern   Nursing Intervention Domain 5250 Decision-making Support   Nursing Interventions Examined current inpatient diabetes/blood glucose control   Explored factors facilitating and impeding inpatient management  Explored corrective strategies with patient and responsible inpatient provider   Informed patient of rational for insulin strategy while hospitalized     Nursing Diagnosis 04673 Ineffective Health Management   Nursing Intervention Domain 5250 Decision-making Support   Nursing Interventions Identified diabetes self-management

## 2024-12-10 NOTE — PROGRESS NOTES
Hospitalist Progress Note    NAME:   Milton Hughes   : 1970   MRN: [id number]     Date/Time: 12/10/2024 4:18 PM  Patient PCP: No primary care provider on file.    Estimated discharge date: Medically stable for DC  Barriers: Social situation/insurance/vacuum    Assessment / Plan:    Sepsis secondary to osteomyelitis of right foot, right lower extremity cellulitis and necrotic toe, resolved  Osteomyelitis of right foot, necrotic toe  Diabetic foot ulcer with gangrene of right fifth toe  Leukocytosis secondary to left lower extremity cellulitis and necrotic toe  Sepsis based on elevated CRP 21.30 initial presentation and leukocytosis, improved    Continue broad-spectrum antibiotic Zosyn and vancomycin  Status post debridement amputation on 2024  Cultures from  are positive for Streptococcus  Status post repeat debridement 2024    Pain control  Tylenol as needed  Oxycodone and Dilaudid  Vascular surgery cleared, excellent toe pressures-recommended continued care with podiatry.  CBC daily  Podiatry on board, await clearance prior to discharge  ID on board, discharge recs: Pathology result d/w Dr. Salazar, podiatry. No OM. Asking pt to discharge home with 10 day course of PO abx.  Last day of abx -will leave on IV while in house  Wound vac per wound care, --wound care planning for every other day changing of wound VAC this week and next week changing it twice. --Case management working on all of this    Poorly controlled diabetes   A1c of 10  - Patient does not take any medication, has not seen his primary care doctor for many years  --Diabetic management consult placed  -- continue sliding scale insulin  -Continue NPH 25 units every 12 hours as recommended by diabetes management-sent to patient's pharmacy to see if this is a more affordable option for him  -- Patient requested increase carbohydrates for diet so he can have more milk    Hyperlipidemia  - LDL of 93, given patient is high  oriented X 3, normal speech,   Psych:   Good insight. Not anxious nor agitated  Skin:  Redness on the right shin--improving, improvement in right lower extremity swelling.  Right foot was wrapped in Ace bandage that was dry clean and intact    Reviewed most current lab test results and cultures  YES  Reviewed most current radiology test results   YES  Review and summation of old records today    NO  Reviewed patient's current orders and MAR    YES  PMH/SH reviewed - no change compared to H&P    Procedures: see electronic medical records for all procedures/Xrays and details which were not copied into this note but were reviewed prior to creation of Plan.      LABS:  I reviewed today's most current labs and imaging studies.  Pertinent labs include:  Recent Labs     12/10/24  0457   WBC 8.2   HGB 13.3   HCT 37.7        Recent Labs     12/08/24  0600 12/09/24  0433 12/10/24  0457   * 137 133*   K 4.0 3.8 4.0   CL 99 102 99   CO2 32 31 31   GLUCOSE 183* 161* 145*   BUN 11 17 17   CREATININE 0.69* 0.66* 0.68*   CALCIUM 8.4* 8.9 8.9       Signed: Samreen Mcgregor MD

## 2024-12-11 LAB
ANION GAP SERPL CALC-SCNC: 4 MMOL/L (ref 2–12)
B PERT DNA SPEC QL NAA+PROBE: NOT DETECTED
BORDETELLA PARAPERTUSSIS BY PCR: NOT DETECTED
BUN SERPL-MCNC: 19 MG/DL (ref 6–20)
BUN/CREAT SERPL: 28 (ref 12–20)
C PNEUM DNA SPEC QL NAA+PROBE: NOT DETECTED
CALCIUM SERPL-MCNC: 8.9 MG/DL (ref 8.5–10.1)
CHLORIDE SERPL-SCNC: 101 MMOL/L (ref 97–108)
CO2 SERPL-SCNC: 29 MMOL/L (ref 21–32)
CREAT SERPL-MCNC: 0.68 MG/DL (ref 0.7–1.3)
ERYTHROCYTE [DISTWIDTH] IN BLOOD BY AUTOMATED COUNT: 11.8 % (ref 11.5–14.5)
FLUAV H1 2009 PAND RNA SPEC QL NAA+PROBE: DETECTED
FLUBV RNA SPEC QL NAA+PROBE: NOT DETECTED
GLUCOSE BLD STRIP.AUTO-MCNC: 146 MG/DL (ref 65–117)
GLUCOSE BLD STRIP.AUTO-MCNC: 168 MG/DL (ref 65–117)
GLUCOSE BLD STRIP.AUTO-MCNC: 189 MG/DL (ref 65–117)
GLUCOSE BLD STRIP.AUTO-MCNC: 209 MG/DL (ref 65–117)
GLUCOSE SERPL-MCNC: 151 MG/DL (ref 65–100)
HADV DNA SPEC QL NAA+PROBE: NOT DETECTED
HCOV 229E RNA SPEC QL NAA+PROBE: NOT DETECTED
HCOV HKU1 RNA SPEC QL NAA+PROBE: NOT DETECTED
HCOV NL63 RNA SPEC QL NAA+PROBE: NOT DETECTED
HCOV OC43 RNA SPEC QL NAA+PROBE: NOT DETECTED
HCT VFR BLD AUTO: 36.9 % (ref 36.6–50.3)
HGB BLD-MCNC: 13 G/DL (ref 12.1–17)
HMPV RNA SPEC QL NAA+PROBE: NOT DETECTED
HPIV1 RNA SPEC QL NAA+PROBE: NOT DETECTED
HPIV2 RNA SPEC QL NAA+PROBE: NOT DETECTED
HPIV3 RNA SPEC QL NAA+PROBE: NOT DETECTED
HPIV4 RNA SPEC QL NAA+PROBE: NOT DETECTED
M PNEUMO DNA SPEC QL NAA+PROBE: NOT DETECTED
MCH RBC QN AUTO: 30.6 PG (ref 26–34)
MCHC RBC AUTO-ENTMCNC: 35.2 G/DL (ref 30–36.5)
MCV RBC AUTO: 86.8 FL (ref 80–99)
NRBC # BLD: 0 K/UL (ref 0–0.01)
NRBC BLD-RTO: 0 PER 100 WBC
PLATELET # BLD AUTO: 190 K/UL (ref 150–400)
PMV BLD AUTO: 9.7 FL (ref 8.9–12.9)
POTASSIUM SERPL-SCNC: 4.1 MMOL/L (ref 3.5–5.1)
RBC # BLD AUTO: 4.25 M/UL (ref 4.1–5.7)
RSV RNA SPEC QL NAA+PROBE: NOT DETECTED
RV+EV RNA SPEC QL NAA+PROBE: NOT DETECTED
SARS-COV-2 RNA RESP QL NAA+PROBE: NOT DETECTED
SERVICE CMNT-IMP: ABNORMAL
SODIUM SERPL-SCNC: 134 MMOL/L (ref 136–145)
WBC # BLD AUTO: 5.7 K/UL (ref 4.1–11.1)

## 2024-12-11 PROCEDURE — 0202U NFCT DS 22 TRGT SARS-COV-2: CPT

## 2024-12-11 PROCEDURE — 6370000000 HC RX 637 (ALT 250 FOR IP): Performed by: PODIATRIST

## 2024-12-11 PROCEDURE — 6370000000 HC RX 637 (ALT 250 FOR IP): Performed by: STUDENT IN AN ORGANIZED HEALTH CARE EDUCATION/TRAINING PROGRAM

## 2024-12-11 PROCEDURE — 2580000003 HC RX 258: Performed by: PODIATRIST

## 2024-12-11 PROCEDURE — 85027 COMPLETE CBC AUTOMATED: CPT

## 2024-12-11 PROCEDURE — 82962 GLUCOSE BLOOD TEST: CPT

## 2024-12-11 PROCEDURE — 6370000000 HC RX 637 (ALT 250 FOR IP)

## 2024-12-11 PROCEDURE — 2580000003 HC RX 258: Performed by: INTERNAL MEDICINE

## 2024-12-11 PROCEDURE — 6360000002 HC RX W HCPCS: Performed by: STUDENT IN AN ORGANIZED HEALTH CARE EDUCATION/TRAINING PROGRAM

## 2024-12-11 PROCEDURE — 6360000002 HC RX W HCPCS: Performed by: INTERNAL MEDICINE

## 2024-12-11 PROCEDURE — 36415 COLL VENOUS BLD VENIPUNCTURE: CPT

## 2024-12-11 PROCEDURE — 1100000003 HC PRIVATE W/ TELEMETRY

## 2024-12-11 PROCEDURE — 80048 BASIC METABOLIC PNL TOTAL CA: CPT

## 2024-12-11 PROCEDURE — 6360000002 HC RX W HCPCS: Performed by: PODIATRIST

## 2024-12-11 RX ORDER — OSELTAMIVIR PHOSPHATE 75 MG/1
75 CAPSULE ORAL 2 TIMES DAILY
Status: DISCONTINUED | OUTPATIENT
Start: 2024-12-11 | End: 2024-12-12 | Stop reason: HOSPADM

## 2024-12-11 RX ORDER — GUAIFENESIN 600 MG/1
600 TABLET, EXTENDED RELEASE ORAL 2 TIMES DAILY
Status: DISCONTINUED | OUTPATIENT
Start: 2024-12-11 | End: 2024-12-12 | Stop reason: HOSPADM

## 2024-12-11 RX ORDER — BENZONATATE 100 MG/1
100 CAPSULE ORAL 3 TIMES DAILY PRN
Status: DISCONTINUED | OUTPATIENT
Start: 2024-12-11 | End: 2024-12-12 | Stop reason: HOSPADM

## 2024-12-11 RX ADMIN — INSULIN HUMAN 25 UNITS: 100 INJECTION, SUSPENSION SUBCUTANEOUS at 08:40

## 2024-12-11 RX ADMIN — FAMOTIDINE 20 MG: 20 TABLET, FILM COATED ORAL at 20:52

## 2024-12-11 RX ADMIN — HYDROMORPHONE HYDROCHLORIDE 0.5 MG: 1 INJECTION, SOLUTION INTRAMUSCULAR; INTRAVENOUS; SUBCUTANEOUS at 21:42

## 2024-12-11 RX ADMIN — GLIPIZIDE 5 MG: 5 TABLET ORAL at 18:01

## 2024-12-11 RX ADMIN — POLYMYXIN B SULFATE AND TRIMETHOPRIM 1 DROP: 10000; 1 SOLUTION OPHTHALMIC at 03:20

## 2024-12-11 RX ADMIN — PIPERACILLIN AND TAZOBACTAM 3375 MG: 3; .375 INJECTION, POWDER, LYOPHILIZED, FOR SOLUTION INTRAVENOUS at 15:27

## 2024-12-11 RX ADMIN — PIPERACILLIN AND TAZOBACTAM 3375 MG: 3; .375 INJECTION, POWDER, LYOPHILIZED, FOR SOLUTION INTRAVENOUS at 06:13

## 2024-12-11 RX ADMIN — SODIUM CHLORIDE, PRESERVATIVE FREE 10 ML: 5 INJECTION INTRAVENOUS at 21:45

## 2024-12-11 RX ADMIN — HYDROMORPHONE HYDROCHLORIDE 0.5 MG: 1 INJECTION, SOLUTION INTRAMUSCULAR; INTRAVENOUS; SUBCUTANEOUS at 03:23

## 2024-12-11 RX ADMIN — POLYMYXIN B SULFATE AND TRIMETHOPRIM 1 DROP: 10000; 1 SOLUTION OPHTHALMIC at 23:04

## 2024-12-11 RX ADMIN — INSULIN HUMAN 25 UNITS: 100 INJECTION, SUSPENSION SUBCUTANEOUS at 21:58

## 2024-12-11 RX ADMIN — ENOXAPARIN SODIUM 30 MG: 100 INJECTION SUBCUTANEOUS at 20:53

## 2024-12-11 RX ADMIN — ACETAMINOPHEN 325MG 650 MG: 325 TABLET ORAL at 03:19

## 2024-12-11 RX ADMIN — ENOXAPARIN SODIUM 30 MG: 100 INJECTION SUBCUTANEOUS at 08:40

## 2024-12-11 RX ADMIN — POLYMYXIN B SULFATE AND TRIMETHOPRIM 1 DROP: 10000; 1 SOLUTION OPHTHALMIC at 18:02

## 2024-12-11 RX ADMIN — LISINOPRIL 40 MG: 20 TABLET ORAL at 08:40

## 2024-12-11 RX ADMIN — INSULIN LISPRO 2 UNITS: 100 INJECTION, SOLUTION INTRAVENOUS; SUBCUTANEOUS at 21:58

## 2024-12-11 RX ADMIN — GLIPIZIDE 5 MG: 5 TABLET ORAL at 08:40

## 2024-12-11 RX ADMIN — POLYMYXIN B SULFATE AND TRIMETHOPRIM 1 DROP: 10000; 1 SOLUTION OPHTHALMIC at 08:41

## 2024-12-11 RX ADMIN — PIPERACILLIN AND TAZOBACTAM 3375 MG: 3; .375 INJECTION, POWDER, LYOPHILIZED, FOR SOLUTION INTRAVENOUS at 22:05

## 2024-12-11 RX ADMIN — GUAIFENESIN 600 MG: 600 TABLET, EXTENDED RELEASE ORAL at 20:53

## 2024-12-11 RX ADMIN — HYDROMORPHONE HYDROCHLORIDE 0.5 MG: 1 INJECTION, SOLUTION INTRAMUSCULAR; INTRAVENOUS; SUBCUTANEOUS at 15:34

## 2024-12-11 RX ADMIN — FAMOTIDINE 20 MG: 20 TABLET, FILM COATED ORAL at 08:40

## 2024-12-11 RX ADMIN — OXYCODONE 5 MG: 5 TABLET ORAL at 04:57

## 2024-12-11 RX ADMIN — INSULIN LISPRO 2 UNITS: 100 INJECTION, SOLUTION INTRAVENOUS; SUBCUTANEOUS at 12:52

## 2024-12-11 RX ADMIN — HYDROMORPHONE HYDROCHLORIDE 0.5 MG: 1 INJECTION, SOLUTION INTRAMUSCULAR; INTRAVENOUS; SUBCUTANEOUS at 10:55

## 2024-12-11 RX ADMIN — ATORVASTATIN CALCIUM 40 MG: 40 TABLET, FILM COATED ORAL at 20:52

## 2024-12-11 RX ADMIN — OXYCODONE 5 MG: 5 TABLET ORAL at 19:41

## 2024-12-11 RX ADMIN — ASPIRIN 81 MG: 81 TABLET, CHEWABLE ORAL at 08:40

## 2024-12-11 RX ADMIN — OSELTAMIVIR PHOSPHATE 75 MG: 75 CAPSULE ORAL at 22:21

## 2024-12-11 ASSESSMENT — PAIN SCALES - GENERAL
PAINLEVEL_OUTOF10: 4
PAINLEVEL_OUTOF10: 8
PAINLEVEL_OUTOF10: 7
PAINLEVEL_OUTOF10: 5
PAINLEVEL_OUTOF10: 5
PAINLEVEL_OUTOF10: 7
PAINLEVEL_OUTOF10: 8
PAINLEVEL_OUTOF10: 4
PAINLEVEL_OUTOF10: 8
PAINLEVEL_OUTOF10: 2
PAINLEVEL_OUTOF10: 7
PAINLEVEL_OUTOF10: 7

## 2024-12-11 ASSESSMENT — PAIN DESCRIPTION - LOCATION
LOCATION: FOOT
LOCATION: LEG
LOCATION: FOOT

## 2024-12-11 ASSESSMENT — PAIN DESCRIPTION - FREQUENCY
FREQUENCY: INTERMITTENT

## 2024-12-11 ASSESSMENT — PAIN DESCRIPTION - DESCRIPTORS
DESCRIPTORS: ACHING
DESCRIPTORS: ACHING
DESCRIPTORS: ACHING;THROBBING
DESCRIPTORS: ACHING
DESCRIPTORS: ACHING;THROBBING
DESCRIPTORS: THROBBING
DESCRIPTORS: ACHING;THROBBING

## 2024-12-11 ASSESSMENT — PAIN - FUNCTIONAL ASSESSMENT
PAIN_FUNCTIONAL_ASSESSMENT: ACTIVITIES ARE NOT PREVENTED
PAIN_FUNCTIONAL_ASSESSMENT: ACTIVITIES ARE NOT PREVENTED
PAIN_FUNCTIONAL_ASSESSMENT: PREVENTS OR INTERFERES SOME ACTIVE ACTIVITIES AND ADLS
PAIN_FUNCTIONAL_ASSESSMENT: PREVENTS OR INTERFERES SOME ACTIVE ACTIVITIES AND ADLS
PAIN_FUNCTIONAL_ASSESSMENT: ACTIVITIES ARE NOT PREVENTED

## 2024-12-11 ASSESSMENT — PAIN DESCRIPTION - ORIENTATION
ORIENTATION: RIGHT
ORIENTATION: RIGHT
ORIENTATION: RIGHT;LOWER
ORIENTATION: RIGHT
ORIENTATION: RIGHT;LOWER
ORIENTATION: RIGHT

## 2024-12-11 ASSESSMENT — PAIN DESCRIPTION - PAIN TYPE
TYPE: SURGICAL PAIN
TYPE: SURGICAL PAIN
TYPE: SURGICAL PAIN;NEUROPATHIC PAIN

## 2024-12-11 ASSESSMENT — PAIN DESCRIPTION - ONSET: ONSET: GRADUAL

## 2024-12-11 NOTE — PROGRESS NOTES
Hospitalist Progress Note    NAME:   Milton Hughes   : 1970   MRN: [id number]     Date/Time: 2024 2:17 PM  Patient PCP: No primary care provider on file.    Estimated discharge date: Medically stable for DC  Barriers: Social situation/insurance/vacuum-likely to be arranged tomorrow    Assessment / Plan:  Sepsis secondary to osteomyelitis of right foot, right lower extremity cellulitis and necrotic toe, resolved  Osteomyelitis of right foot, necrotic toe  Diabetic foot ulcer with gangrene of right fifth toe  Leukocytosis secondary to left lower extremity cellulitis and necrotic toe  Sepsis based on elevated CRP 21.30 initial presentation and leukocytosis, improved    Continue broad-spectrum antibiotic Zosyn and vancomycin  Status post debridement amputation on 2024  Cultures from  are positive for Streptococcus  Status post repeat debridement 2024    Pain control  Tylenol as needed  Oxycodone and Dilaudid  Vascular surgery cleared, excellent toe pressures-recommended continued care with podiatry.  Podiatry evaluated the patient and took the patient for right foot debridement on 2024  Patient was again taken for surgery on 2024 for closure of wound with a skin graft.  ID on board, discharge recs: Pathology result d/w Dr. Salazar, podiatry. No OM. Asking pt to discharge home with 10 day course of PO abx.  Last day of abx -will leave on IV while in house  Wound vac per wound care-wound care to be arranged likely tomorrow before discharge.    Poorly controlled diabetes  A1c of 10  Patient does not take any medication, has not seen his primary care doctor for many years  Diabetic management consult placed  Continue sliding scale insulin  Continue NPH 25 units every 12 hours as recommended by diabetes management-sent to patient's pharmacy to see if this is a more affordable option for him    Hyperlipidemia  LDL of 93, given patient is high risk with uncontrolled diabetes    LDL needs to be less than 70 started on Lipitor 40 mg daily     Hypertension  Given patient is diabetic and renal protective effect of ACE inhibitors,  stopped amlodipine and started on lisinopril 40 mg daily  Given patient is high risk for coronary artery disease recommend baby aspirin 81 mg daily    Conjunctivitis, right eye  Polytrim drops 4 times daily x 7 days  Artificial tears as needed    1 episode of fever with throat irritation  Follow-up respiratory viral panel    Medical Decision Making:   I personally reviewed labs: CBC, BMP  I personally reviewed imaging: NONE   I personally reviewed EKG:NONE   Toxic drug monitoring: BG monitoring-toxicity hypoglycemia given sliding scale, creatinine functions as patient is on vancomycin and Zosyn, hypoglycemia  Discussed case with: Patient    Code Status: Full code  DVT Prophylaxis: Lovenox   GI Prophylaxis: Famotidine    Subjective:   Patient currently admitted and treated for osteomyelitis of right foot.    Labs and chart reviewed patient examined overnight events noted.  Patient appeared to be comfortable no apparent distress.    Objective:     VITALS:   Last 24hrs VS reviewed since prior progress note. Most recent are:  Patient Vitals for the past 24 hrs:   BP Temp Temp src Pulse Resp SpO2   12/11/24 0742 107/65 98.8 °F (37.1 °C) Oral 83 16 98 %   12/11/24 0255 123/76 99.9 °F (37.7 °C) -- 100 16 95 %   12/11/24 0042 -- (!) 102.4 °F (39.1 °C) -- -- -- --   12/10/24 2008 139/87 100.2 °F (37.9 °C) -- 93 16 97 %   12/10/24 1532 112/73 99.3 °F (37.4 °C) Oral 91 18 98 %         Intake/Output Summary (Last 24 hours) at 12/11/2024 1417  Last data filed at 12/11/2024 0600  Gross per 24 hour   Intake 200 ml   Output 0 ml   Net 200 ml            I had a face to face encounter and independently examined this patient on 12/11/2024, as outlined below:  PHYSICAL EXAM:  General: Alert, cooperative  EENT:  EOMI. Anicteric sclerae.  Resp:  CTA bilaterally, no wheezing or rales.

## 2024-12-11 NOTE — PROGRESS NOTES
End of Shift Note    Bedside shift change report given to ESTRELLA Denton (oncoming nurse) by Amy Negrete LPN (offgoing nurse).  Report included the following information SBAR, Intake/Output, MAR, and Recent Results    Shift worked:  7a-7p     Shift summary and any significant changes:     Insulin coverage at breakfast and lunch given. Medicated for pain with dilaudid x 2 for pain and oxycodone and tylenol x 1. Unmeasurable output from wound vac. Pt requesting to wait until after eating to administer antibiotic and to pause antibiotic to eat x 2 today. Educated patient on importance of antibiotics being given when due and not delaying in administration.      Concerns for physician to address:  none     Zone phone for oncoming shift:   [phone]       Activity:  Level of Assistance: Independent  Number times ambulated in hallways past shift: 0  Number of times OOB to chair past shift: 1    Cardiac:   Cardiac Monitoring: No      Cardiac Rhythm: Sinus rhythm    Access:  Current line(s): PIV     Genitourinary:   Urinary Status: Voiding, Bathroom privileges    Respiratory:   O2 Device: None (Room air)  Chronic home O2 use?: NO  Incentive spirometer at bedside: NO    GI:  Last BM (including prior to admit): 12/09/24  Current diet:  ADULT ORAL NUTRITION SUPPLEMENT; Breakfast, Lunch; Wound Healing Oral Supplement  ADULT DIET; Regular; 4 carb choices (60 gm/meal)  DIET ONE TIME MESSAGE;  Passing flatus: YES    Pain Management:   Patient states pain is manageable on current regimen: YES    Skin:  Isaiah Scale Score: 19  Interventions: Wound Offloading (Prevention Methods): Turning, Repositioning, Pillows, Elevate heels    Patient Safety:  Fall Risk: Nursing Judgement-Fall Risk High(Add Comments): No  Fall Risk Interventions  Nursing Judgement-Fall Risk High(Add Comments): No  Toilet Every 2 Hours-In Advance of Need: No (Comment) (independent)  Hourly Visual Checks: Awake, In bed  Fall Visual Posted: Socks, Fall sign posted  Room

## 2024-12-11 NOTE — OP NOTE
[facility]              [address] [place]  [address]                            OPERATIVE REPORT      PATIENT NAME: LOIS TENORIO                : 1970  MED REC NO: [id number]                       ROOM: 3113  ACCOUNT NO: [id number]                       ADMIT DATE: 2024  PROVIDER: Cecilio Salazar DPM    DATE OF SERVICE:  2024    PREOPERATIVE DIAGNOSES:  Osteomyelitis of the right foot and a necrotic right 5th toe.    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    PROCEDURES PERFORMED:  Right 5th toe amputation and incision and drainage of the right foot.    SURGEON:  Cecilio Salazar DPM    ASSISTANT:  None.    ANESTHESIA:  MAC.    ESTIMATED BLOOD LOSS:  None.    SPECIMENS REMOVED:  5th toe was sent for pathology and proximal deep cultures were taken in 2 areas.    INTRAOPERATIVE FINDINGS:  Nonviable soft tissue into the dorsal right foot along with the 5th toe infected with avascularity of the significant soft tissue on the lateral 5th ray.     COMPLICATIONS:  None.    IMPLANTS:  None.    INDICATIONS:  The patient came in with infected diabetic foot infection with avascularity and wet gangrene with significant tissue loss from vascular and infection of the 5th toe.    DESCRIPTION OF PROCEDURE:  This patient has been admitted through the ER for severely infected diabetic foot, uncontrolled diabetes, and the patient has not been taking his diabetic medications and unaware of the levels of diabetes and blood sugar levels.  The patient was admitted and given IV antibiotics for a significant infection.  Confirmed diagnosis of soft tissue infection was noted with possible osteomyelitis and septic arthritis in the 5th MPJ.  The patient was consented for the amputation of the 5th toe initially and further evaluation following surgery.    The  patient was consented for the same and brought into the OR and left on the patient's bed in supine position.  IV sedation performed as per CRNA.  The local infiltration of 0.5% Marcaine plain was injected for the right foot lateral block with sural nerve and the tourniquet was placed with Webril padding and set at 250 mmHg at the ankle and the right foot was prepped and draped in the usual sterile manner.  After anesthesia check, the tourniquet was fully inflated to the previously mentioned setting and attention was directed to the dorsum of the right foot laterally.  After anesthesia check, an incision was made around the base of the 5th toe with a #15 sterile blade and carried down into the soft tissues debriding the nonviable tissue into the metatarsophalangeal joint level.  The entire toe was dislocated at the MPJ and was removed with a continued avascular area.  Tourniquet was deflated after removal of the toe and sent for pathology and deep cultures were taken at this point.  Due to non-viability of the soft tissue and no bleeding was noted, the tourniquet was deflated after 9 minutes and further debridement was done to the area.  At this point, there was significant local regional blood vessel clotting was seen.  The proximal head of the bone showed no significant damage and the area was then irrigated with IrriSept followed by normal saline and the wound was packed with iodoform gauze and then covered with mild compressive bandage.  The patient tolerated the procedure and anesthesia with no complications, was sent back to the medical floor for medical management and may need to be brought back to the OR for further debridement in a few days depending on the vascular return.    HEMOSTASIS:  Ankle tourniquet at 250 mmHg and only on for 9 minutes and then released with hyperemia in the forefoot with a good cap refill.  No hemorrhage through the tissue proximal to the resection and minimal return.  It was

## 2024-12-11 NOTE — CARE COORDINATION
Transition of Care Plan:     RUR: 9%  Prior Level of Functioning:   Disposition: Home w/family  LISA:   If SNF or IPR: Date FOC offered:   Date FOC received:   Accepting facility:   Date authorization started with reference number:   Date authorization received and expires:   Follow up appointments: on AVS  DME needed: wound vac  Transportation at discharge: significant other  IM/IMM Medicare/ letter given: n/a  Is patient a [place] and connected with [facility]?               If yes, was  transfer form completed and [facility] notified?   Caregiver Contact: Lou Vallecillo [phone]  Discharge Caregiver contacted prior to discharge?   Care Conference needed?   Barriers to discharge: wound care for vac    Due to pt's insurance status of Medicaid pending, the pt would have to see a BS MD w/the [facility] wound care clinic.  Due to that aspect & the holiday season, the first available appointment is Dec 30th.  CM did schedule Dec 30th at 9am.  CM is currently waiting for a return call from Dr Salazar, regarding pt seeing podiatry for wound vac dressing changes until the wound care clinic can begin seeing pt.  CM will continue to follow.    11:45  Dr Salazar has agreed to provide vac dressing changes 2x week for the next two weeks.      12:23  CM faxed( [phone]) additional info requested by I, to Hanny Alvarez.    2:32  CM faxed([phone]) certificate of medical necessity as requested.      Kristen Hardwick  Ext [phone]

## 2024-12-11 NOTE — WOUND CARE
Wound Care / Wound Vac check and Disposition status:  Chart reviewed and spoke with the , Kristen Cotter.  She has gotten the Wound Vac approved as a Kaci Vac but now working on getting the Outpatient Wound Center to change the Vac dressing until the Grafts have completely set into the wound bed. If they can do it twice a week x 1 month while the wound vac is part of his care, they can Rx different wound care for he and his wife to do at home that is able to be done. Unknown if Dr. Salazar still needs to see him post op or does he want the patient to just follow up at the [facility] outpatient wound center.  The soonest the outpatient wound center can see this patient is on Monday.   Assessment: Pt. Stated, \"I am feeling under the weather\".  Pt. Had a fever most of the night last night. Still needs pain meds for his foot.   The drainage in the Vac canister is serosanguinous scant amount of drainage. The dressing is intact and the visible skin is no longer red.   We can obtain more wound photos tomorrow and will include the Mepitel silicone in the Vac materials for patient to take to the wound center.  Treatment plan for today:  Let the Wound Vac continue for today and we can change the vac tomorrow (Thursday).  Then the outpatient wound center can see him on Monday.    The Kaci Wound vac will need to be delivered to the hospital for us to place on the patient.   Marcy Gallardo RN, BSN, CWON

## 2024-12-11 NOTE — PROGRESS NOTES
Can be discharged ae per Podiatry with following instructions and recommendations  Will need follow up in my office after discharge   Oral antibiotics as per ID   Wound vac and change if home health is found if not he will have to follow up in my office once a week for wound care and bandage change   As tolerated weight bearing on the right foot with a post op shoe     If need further assistance from please reach out to me on perfect serv or my cell [phone]

## 2024-12-11 NOTE — PROGRESS NOTES
End of Shift Note    Bedside shift change report given to JARAD Marshall (oncoming nurse) by Corrie Hernandez RN (offgoing nurse).  Report included the following information SBAR    Shift worked:  0595-6718     Shift summary and any significant changes:    Pt's temp went up to 100.2 at around 9pm, prn tylenol given.  Checked temp at quarter to 1 and it went up to 102.4, secure messaged crosscover.  Prn meds given x2 for pain, see MAR       Concerns for physician to address:    Zone phone for oncoming shift:         Activity:  Level of Assistance: Independent  Number times ambulated in hallways past shift: 0  Number of times OOB to chair past shift: 4    Cardiac:   Cardiac Monitoring: No      Cardiac Rhythm: Sinus rhythm    Access:  Current line(s): PIV     Genitourinary:   Urinary Status: Voiding, Bathroom privileges    Respiratory:   O2 Device: None (Room air)  Chronic home O2 use?: NO  Incentive spirometer at bedside: YES    GI:  Last BM (including prior to admit): 12/10/24  Current diet:  ADULT ORAL NUTRITION SUPPLEMENT; Breakfast, Lunch; Wound Healing Oral Supplement  ADULT DIET; Regular; 4 carb choices (60 gm/meal)  DIET ONE TIME MESSAGE;  Passing flatus: YES    Pain Management:   Patient states pain is manageable on current regimen: YES    Skin:  Isaiah Scale Score: 19  Interventions: Wound Offloading (Prevention Methods): Repositioning, Turning    Patient Safety:  Fall Risk: Nursing Judgement-Fall Risk High(Add Comments): No  Fall Risk Interventions  Nursing Judgement-Fall Risk High(Add Comments): No  Toilet Every 2 Hours-In Advance of Need: Yes  Hourly Visual Checks: Awake, In bed  Fall Visual Posted: Socks, Fall sign posted  Room Door Open: Deferred to promote rest  Alarm On: Bed  Patient Moved Closer to Nursing Station: No    Active Consults:   IP CONSULT TO HOSPITALIST  PHARMACY TO DOSE VANCOMYCIN  IP CONSULT TO PODIATRY  IP CONSULT TO INFECTIOUS DISEASES  IP CONSULT TO VASCULAR SURGERY  IP CONSULT

## 2024-12-12 VITALS
HEIGHT: 72 IN | DIASTOLIC BLOOD PRESSURE: 85 MMHG | HEART RATE: 94 BPM | OXYGEN SATURATION: 96 % | SYSTOLIC BLOOD PRESSURE: 149 MMHG | BODY MASS INDEX: 31.83 KG/M2 | TEMPERATURE: 100.4 F | WEIGHT: 235 LBS | RESPIRATION RATE: 18 BRPM

## 2024-12-12 LAB
GLUCOSE BLD STRIP.AUTO-MCNC: 124 MG/DL (ref 65–117)
GLUCOSE BLD STRIP.AUTO-MCNC: 135 MG/DL (ref 65–117)
GLUCOSE BLD STRIP.AUTO-MCNC: 184 MG/DL (ref 65–117)
SERVICE CMNT-IMP: ABNORMAL

## 2024-12-12 PROCEDURE — 6370000000 HC RX 637 (ALT 250 FOR IP): Performed by: PODIATRIST

## 2024-12-12 PROCEDURE — 6370000000 HC RX 637 (ALT 250 FOR IP): Performed by: STUDENT IN AN ORGANIZED HEALTH CARE EDUCATION/TRAINING PROGRAM

## 2024-12-12 PROCEDURE — 2580000003 HC RX 258: Performed by: INTERNAL MEDICINE

## 2024-12-12 PROCEDURE — 6370000000 HC RX 637 (ALT 250 FOR IP)

## 2024-12-12 PROCEDURE — 6360000002 HC RX W HCPCS: Performed by: STUDENT IN AN ORGANIZED HEALTH CARE EDUCATION/TRAINING PROGRAM

## 2024-12-12 PROCEDURE — 6360000002 HC RX W HCPCS: Performed by: INTERNAL MEDICINE

## 2024-12-12 PROCEDURE — 82962 GLUCOSE BLOOD TEST: CPT

## 2024-12-12 PROCEDURE — 6360000002 HC RX W HCPCS: Performed by: PODIATRIST

## 2024-12-12 RX ORDER — ATORVASTATIN CALCIUM 40 MG/1
40 TABLET, FILM COATED ORAL NIGHTLY
Qty: 30 TABLET | Refills: 3 | Status: SHIPPED | OUTPATIENT
Start: 2024-12-12

## 2024-12-12 RX ORDER — GLIPIZIDE 5 MG/1
5 TABLET ORAL
Qty: 60 TABLET | Refills: 3 | Status: SHIPPED | OUTPATIENT
Start: 2024-12-12

## 2024-12-12 RX ORDER — CASTOR OIL AND BALSAM, PERU 788; 87 MG/G; MG/G
OINTMENT TOPICAL 2 TIMES DAILY
Status: DISCONTINUED | OUTPATIENT
Start: 2024-12-12 | End: 2024-12-12 | Stop reason: HOSPADM

## 2024-12-12 RX ORDER — LISINOPRIL 40 MG/1
40 TABLET ORAL DAILY
Qty: 30 TABLET | Refills: 3 | Status: SHIPPED | OUTPATIENT
Start: 2024-12-13

## 2024-12-12 RX ORDER — OSELTAMIVIR PHOSPHATE 75 MG/1
75 CAPSULE ORAL 2 TIMES DAILY
Qty: 8 CAPSULE | Refills: 0 | Status: SHIPPED | OUTPATIENT
Start: 2024-12-12 | End: 2024-12-16

## 2024-12-12 RX ADMIN — ACETAMINOPHEN 325MG 650 MG: 325 TABLET ORAL at 05:21

## 2024-12-12 RX ADMIN — ASPIRIN 81 MG: 81 TABLET, CHEWABLE ORAL at 10:08

## 2024-12-12 RX ADMIN — PIPERACILLIN AND TAZOBACTAM 3375 MG: 3; .375 INJECTION, POWDER, LYOPHILIZED, FOR SOLUTION INTRAVENOUS at 06:10

## 2024-12-12 RX ADMIN — HYDROMORPHONE HYDROCHLORIDE 0.5 MG: 1 INJECTION, SOLUTION INTRAMUSCULAR; INTRAVENOUS; SUBCUTANEOUS at 05:22

## 2024-12-12 RX ADMIN — GUAIFENESIN 600 MG: 600 TABLET, EXTENDED RELEASE ORAL at 10:08

## 2024-12-12 RX ADMIN — HYDROMORPHONE HYDROCHLORIDE 0.5 MG: 1 INJECTION, SOLUTION INTRAMUSCULAR; INTRAVENOUS; SUBCUTANEOUS at 10:44

## 2024-12-12 RX ADMIN — LISINOPRIL 40 MG: 20 TABLET ORAL at 10:08

## 2024-12-12 RX ADMIN — INSULIN HUMAN 25 UNITS: 100 INJECTION, SUSPENSION SUBCUTANEOUS at 10:09

## 2024-12-12 RX ADMIN — ENOXAPARIN SODIUM 30 MG: 100 INJECTION SUBCUTANEOUS at 10:08

## 2024-12-12 RX ADMIN — POLYMYXIN B SULFATE AND TRIMETHOPRIM 1 DROP: 10000; 1 SOLUTION OPHTHALMIC at 10:09

## 2024-12-12 RX ADMIN — POLYMYXIN B SULFATE AND TRIMETHOPRIM 1 DROP: 10000; 1 SOLUTION OPHTHALMIC at 05:11

## 2024-12-12 RX ADMIN — OSELTAMIVIR PHOSPHATE 75 MG: 75 CAPSULE ORAL at 10:08

## 2024-12-12 RX ADMIN — FAMOTIDINE 20 MG: 20 TABLET, FILM COATED ORAL at 10:13

## 2024-12-12 RX ADMIN — GLIPIZIDE 5 MG: 5 TABLET ORAL at 06:10

## 2024-12-12 ASSESSMENT — PAIN SCALES - GENERAL
PAINLEVEL_OUTOF10: 3
PAINLEVEL_OUTOF10: 7

## 2024-12-12 ASSESSMENT — PAIN DESCRIPTION - LOCATION: LOCATION: FOOT

## 2024-12-12 ASSESSMENT — PAIN DESCRIPTION - ORIENTATION: ORIENTATION: RIGHT

## 2024-12-12 NOTE — PROGRESS NOTES
End of Shift Note    Bedside shift change report given to ESTRELLA Posadas (oncoming nurse) by Keyshawn Ellsworth RN (offgoing nurse).  Report included the following information SBAR and MAR    Shift worked: 7pm - 7am     Shift summary and any significant changes:    Pain meds given PRN. Abx given as ordered. Wound vac draining minimal output. 2 units of humalog given for BG of 209 at 2148. Ambulated to the bathroom independently with walker. Had fever this morning 100.4 tylenol was given.     Concerns for physician to address:       Zone phone for oncoming shift:          Activity:  Level of Assistance: Independent  Number times ambulated in hallways past shift: 0  Number of times OOB to chair past shift: 1    Cardiac:   Cardiac Monitoring: No      Cardiac Rhythm: Sinus rhythm    Access:  Current line(s): PIV     Genitourinary:   Urinary Status: Voiding    Respiratory:   O2 Device: None (Room air)  Chronic home O2 use?: NO  Incentive spirometer at bedside:     GI:  Last BM (including prior to admit): 12/10/24  Current diet:  ADULT ORAL NUTRITION SUPPLEMENT; Breakfast, Lunch; Wound Healing Oral Supplement  ADULT DIET; Regular; 4 carb choices (60 gm/meal)  DIET ONE TIME MESSAGE;  Passing flatus: YES    Pain Management:   Patient states pain is manageable on current regimen: YES    Skin:  Isaiah Scale Score: 20  Interventions: Wound Offloading (Prevention Methods): Bed, pressure redistribution/air    Patient Safety:  Fall Risk: Nursing Judgement-Fall Risk High(Add Comments): No  Fall Risk Interventions  Nursing Judgement-Fall Risk High(Add Comments): No  Toilet Every 2 Hours-In Advance of Need: Yes  Hourly Visual Checks: Awake, In bed  Fall Visual Posted: Fall sign posted  Room Door Open: Deferred to promote rest  Alarm On: Other (Comment)  Patient Moved Closer to Nursing Station: No    Active Consults:   IP CONSULT TO HOSPITALIST  PHARMACY TO DOSE VANCOMYCIN  IP CONSULT TO PODIATRY  IP CONSULT TO INFECTIOUS DISEASES  IP  CONSULT TO VASCULAR SURGERY  IP CONSULT TO DIABETES MANAGEMENT  IP CONSULT TO CASE MANAGEMENT    Length of Stay:  Expected LOS: 13  Actual LOS: 13    Keyshawn Ellsworth RN

## 2024-12-12 NOTE — PLAN OF CARE
Problem: Chronic Conditions and Co-morbidities  Goal: Patient's chronic conditions and co-morbidity symptoms are monitored and maintained or improved  Outcome: Progressing     Problem: Discharge Planning  Goal: Discharge to home or other facility with appropriate resources  12/12/2024 1153 by Cuauhtemoc Lorenzo RN  Outcome: Progressing  12/12/2024 0107 by Keyshawn Ellsworth RN  Outcome: Progressing     Problem: Pain  Goal: Verbalizes/displays adequate comfort level or baseline comfort level  Outcome: Progressing     Problem: Skin/Tissue Integrity - Adult  Goal: Incisions, wounds, or drain sites healing without S/S of infection  Outcome: Progressing     Problem: Musculoskeletal - Adult  Goal: Return mobility to safest level of function  Outcome: Progressing     Problem: Safety - Adult  Goal: Free from fall injury  Outcome: Progressing     Problem: Skin/Tissue Integrity  Goal: Absence of new skin breakdown  Description: 1.  Monitor for areas of redness and/or skin breakdown  2.  Assess vascular access sites hourly  3.  Every 4-6 hours minimum:  Change oxygen saturation probe site  4.  Every 4-6 hours:  If on nasal continuous positive airway pressure, respiratory therapy assess nares and determine need for appliance change or resting period.  Outcome: Progressing     Problem: Nutrition Deficit:  Goal: Optimize nutritional status  Outcome: Progressing

## 2024-12-12 NOTE — WOUND CARE
Wound care follow up for the Right foot post op.  Pt. Arrangements for follow up outpatient are being done by case management. Dr. Salazar has agreed to see the patient if home health cannot be arranged. We will find out about the [facility] Wound Vac at some point today.   Assessment and Treatment today: Pt.'s wound vac was removed and the wound cleansed with Vashe and gauze. The toes are red where the post op shoe rests on the toes and there is a small purple area on the 4th toe from where the straps rub on the toe. Today this was padded and part of the boot cut out so that it is not in contact with the toe. The straps were also ivelisse crossed and this made the boot fit better for him.   The Wound bed is pink under the Theraskin and the stravix grafts. The staples are intact. Periwound skin is mildly red as stated above. Will order some Venelex ointment to put on the 4th toe.         Treatment: the wound was cleansed with Vashe solution and gauze. A new layer of the Silicone (Mepitel One) was applied to the wound bed to fit it. The black Granufoam was applied to this layer and occlusively sealed. The seal had to be assisted at the toe with an ostomy ring seal.   Pt. Tolerated the wound care well. The Vac solis down on 125 mmHg pressure.   The boot was adjusted.   Note: [employer] notified this wound care nurse to give the patient a Ready Vac for home use.   Pt. Will go to the Podiatrist office on Monday and he will dress the wound there.   Will give him the Ready Vac today.   Marcy Gallardo RN, BSN, CWON    Addendum: the Home Wound Vac Ready kit # [id number] was given to the patient today. Basic operational instructions given to patient and his wife.   Included some dressings for after the wound vac therapy is completed (Silver alginate, ABD pads and some marivel).   Marcy Gallardo RN, BSN, CWON

## 2024-12-12 NOTE — DISCHARGE SUMMARY
Discharge Summary    Name: Milton Hughes  [id number]  YOB: 1970 (Age: 54 y.o.)   Date of Admission: 11/29/2024  Date of Discharge: 12/12/2024  Attending Physician: Maik Cook MD    Discharge Diagnosis:   Sepsis secondary to osteomyelitis of right foot, right lower extremity cellulitis and necrotic toe, resolved  Osteomyelitis of right foot, necrotic toe  Diabetic foot ulcer with gangrene of right fifth toe  Leukocytosis secondary to left lower extremity cellulitis and necrotic toe  Sepsis based on elevated CRP 21.30 initial presentation and leukocytosis, improved  Poorly controlled diabetes  [id number] of 10  Hyperlipidemia  Hypertension  Conjunctivitis, right eye  Influenza A positive     Consultations:  IP CONSULT TO HOSPITALIST  PHARMACY TO DOSE VANCOMYCIN  IP CONSULT TO PODIATRY  IP CONSULT TO INFECTIOUS DISEASES  IP CONSULT TO VASCULAR SURGERY  IP CONSULT TO DIABETES MANAGEMENT  IP CONSULT TO CASE MANAGEMENT        Brief Admission History/Reason for Admission Per Yazan Rivera MD:   Milton Hughes is a 54 y.o.  male with PMHx significant for diabetes, HTN, not on any medications who comes in with complaints of fever since Tuesday, right for discoloration.  Patient reports that he started having fevers and chills since Tuesday, that improved with Tylenol and ibuprofen.  Noticed discoloration of his foot yesterday after he was trying to put his shoe on, reports that his foot was normal 2 days prior, but has turned red and discomfortable when he walks around.  Denies any trauma or falls.  Denies any previous similar episodes.  Also noted sloughing of the skin on top of his foot, and also noticed an ulcer on the bottom of his foot as well.  Patient is not on any medications at home for blood pressure or diabetes.  We were asked to admit for work up and evaluation of the above problems.         Brief Hospital Course by Main Problems:   Sepsis secondary to

## 2024-12-12 NOTE — PROGRESS NOTES
.End of Shift Note    Bedside shift change report given to ESTRELLA Mahajan (oncoming nurse) by TIMMY NÚÑEZ LPN (offgoing nurse).  Report included the following information SBAR, Kardex, OR Summary, Procedure Summary, Intake/Output, MAR, and Recent Results    Shift worked:  7-7     Shift summary and any significant changes:     Patient has the Flu, tested positive today. On droplet precautions. PCP aware. Right foot wound Vac intact, minimal drainage noted. Patient ambulates with walker and post op shoe to bathroom. Passed BM today. Noted patient having a cough . Patient to start tamiflu and to receive medication for cough. Glucose checks stable today. Patient take pain medication for complaints of soreness to right foot.      Concerns for physician to address:  Plan of care/Discharge planning     Zone phone for oncoming shift:   [phone]       Activity:  Level of Assistance: Independent  Number times ambulated in hallways past shift: 0  Number of times OOB to chair past shift: 2    Cardiac:   Cardiac Monitoring: No      Cardiac Rhythm: Sinus rhythm    Access:  Current line(s): PIV     Genitourinary:   Urinary Status: Voiding    Respiratory:   O2 Device: None (Room air)  Chronic home O2 use?: NO  Incentive spirometer at bedside: NO    GI:  Last BM (including prior to admit): 12/10/24  Current diet:  ADULT ORAL NUTRITION SUPPLEMENT; Breakfast, Lunch; Wound Healing Oral Supplement  ADULT DIET; Regular; 4 carb choices (60 gm/meal)  DIET ONE TIME MESSAGE;  Passing flatus: YES    Pain Management:   Patient states pain is manageable on current regimen: YES    Skin:  Isaiah Scale Score: 19  Interventions: Wound Offloading (Prevention Methods): Bed, pressure reduction mattress, Pillows, Repositioning, Elevate heels    Patient Safety:  Fall Risk: Nursing Judgement-Fall Risk High(Add Comments): No  Fall Risk Interventions  Nursing Judgement-Fall Risk High(Add Comments): No  Toilet Every 2 Hours-In Advance of Need: Yes  Hourly  Visual Checks: In bed, Quiet, Awake  Fall Visual Posted: Fall sign posted  Room Door Open: Deferred to promote rest  Alarm On: Other (Comment)  Patient Moved Closer to Nursing Station: No    Active Consults:   IP CONSULT TO HOSPITALIST  PHARMACY TO DOSE VANCOMYCIN  IP CONSULT TO PODIATRY  IP CONSULT TO INFECTIOUS DISEASES  IP CONSULT TO VASCULAR SURGERY  IP CONSULT TO DIABETES MANAGEMENT  IP CONSULT TO CASE MANAGEMENT    Length of Stay:  Expected LOS: 13  Actual LOS: 12    TIMMY NÚÑEZ LPN

## 2024-12-12 NOTE — PROGRESS NOTES
Discharge Summary    Name: Milton Hughes  [id number]  YOB: 1970 (Age: 54 y.o.)   Date of Admission: 11/29/2024  Date of Discharge: 12/12/2024  Attending Physician: Maik Cook MD    Discharge Diagnosis:   Sepsis secondary to osteomyelitis of right foot, right lower extremity cellulitis and necrotic toe, resolved  Osteomyelitis of right foot, necrotic toe  Diabetic foot ulcer with gangrene of right fifth toe  Leukocytosis secondary to left lower extremity cellulitis and necrotic toe  Sepsis based on elevated CRP 21.30 initial presentation and leukocytosis, improved  Poorly controlled diabetes  [id number] of 10  Hyperlipidemia  Hypertension  Conjunctivitis, right eye  Influenza A positive    Consultations:  IP CONSULT TO HOSPITALIST  PHARMACY TO DOSE VANCOMYCIN  IP CONSULT TO PODIATRY  IP CONSULT TO INFECTIOUS DISEASES  IP CONSULT TO VASCULAR SURGERY  IP CONSULT TO DIABETES MANAGEMENT  IP CONSULT TO CASE MANAGEMENT      Brief Admission History/Reason for Admission Per Yazan Rivera MD:   Milton Hughes is a 54 y.o.  male with PMHx significant for diabetes, HTN, not on any medications who comes in with complaints of fever since Tuesday, right for discoloration.  Patient reports that he started having fevers and chills since Tuesday, that improved with Tylenol and ibuprofen.  Noticed discoloration of his foot yesterday after he was trying to put his shoe on, reports that his foot was normal 2 days prior, but has turned red and discomfortable when he walks around.  Denies any trauma or falls.  Denies any previous similar episodes.  Also noted sloughing of the skin on top of his foot, and also noticed an ulcer on the bottom of his foot as well.  Patient is not on any medications at home for blood pressure or diabetes.  We were asked to admit for work up and evaluation of the above problems.        Brief Hospital Course by Main Problems:   Sepsis secondary to

## 2024-12-12 NOTE — PROGRESS NOTES
Physician Progress Note      PATIENT:               LOIS TENORIO  CSN #:                  [id number]  :                       1970  ADMIT DATE:       2024 8:42 AM  DISCH DATE:  RESPONDING  PROVIDER #:        Maik Cook MD          QUERY TEXT:    Pt admitted with BLE cellulitis. Pt noted to have DM2 A1C 10 , . If possible,   please document in progress notes and discharge summary the relationship, if   any, between cellulitis and DM.    The medical record reflects the following:  Risk Factors: 54 YOM, DM2, A1C 10, PVD, former smoker  Clinical Indicators:  Pt presents ambulatory to triage w/ complaints of fevers   (101) and increasingly worse R foot swelling, discoloration, and pain x4   days.  WBC 12.4>13.3>11.7    98.8-95-/85  LA sepsis 1.4  procal 0.63    CRP 21.30,   PN: poorly controlled DM2, not taking medication, has not see primary   care for many years    OP note:  RIGHT FIFTH TOE AMPUTATION AND INCISION AND DRAINAGE OF RIGHT   FOOT  12/3 ID: Cellulitis of right lower extremity and foot   PN: Sepsis 2/2 osteomyelitis R foot, RLE cellulitis w necrotic toe , LLE   cellulitis  Diabetic right foot ulcer   PN: Cultures from  are positive for Streptococcus, Status post repeat   debridement 2024  Treatment: ID, podiatry,  consult, R toe amputation, wound vac, IV   antibiotics, IVFs    Thank You,  Katarina Villagomez RN. C BSN  Clinical   [email or web address]  O: [phone]   Options provided:  -- BLE cellulitis associated with Diabetes  -- BLE cellulitis unrelated to Diabetes  -- Other - I will add my own diagnosis  -- Disagree - Not applicable / Not valid  -- Disagree - Clinically unable to determine / Unknown  -- Refer to Clinical Documentation Reviewer    PROVIDER RESPONSE TEXT:    BLE cellulitis associated with Diabetes.    Query created by: Katarina Villagomez on 2024 3:43 PM      Electronically signed by:  Maik

## 2024-12-12 NOTE — CARE COORDINATION
Transition of Care Plan:     RUR: 9%  Prior Level of Functioning:   Disposition: Home w/family  LISA:   If SNF or IPR: Date FOC offered:   Date FOC received:   Accepting facility:   Date authorization started with reference number:   Date authorization received and expires:   Follow up appointments: on AVS  DME needed: wound vac & r/w  Transportation at discharge: significant other  IM/IMM Medicare/ letter given: n/a  Is patient a  and connected with [facility]?               If yes, was [place] transfer form completed and [facility] notified?   Caregiver Contact: Lou Vallecillo [phone]  Discharge Caregiver contacted prior to discharge?   Care Conference needed?   Barriers to discharge:     [facility] approved [facility]ina wound vac.  Dr. Salazar has agreed to provide wound vac dressing changes in office until the wound care clinic can see pt on Dec 30th.  Patient is expected to d/c home today without any other needs or concerns.  First dressing change is Monday Dec 16th w/Dr Salazar.  CM faxed signed wound vac receipt to [facility]   CM delivered r/w to pt    Kristen Hardwick  Ext [phone]

## 2024-12-17 ENCOUNTER — HOSPITAL ENCOUNTER (EMERGENCY)
Facility: HOSPITAL | Age: 54
Discharge: HOME | End: 2024-12-17
Attending: EMERGENCY MEDICINE
Payer: MEDICAID

## 2024-12-17 ENCOUNTER — HOSPITAL ENCOUNTER (OUTPATIENT)
Facility: HOSPITAL | Age: 54
Discharge: HOME | End: 2024-12-17
Attending: PODIATRIST | Admitting: PODIATRIST
Payer: MEDICAID

## 2024-12-17 ENCOUNTER — APPOINTMENT (OUTPATIENT)
Facility: HOSPITAL | Age: 54
End: 2024-12-17
Payer: MEDICAID

## 2024-12-17 VITALS
BODY MASS INDEX: 31.83 KG/M2 | OXYGEN SATURATION: 99 % | RESPIRATION RATE: 24 BRPM | WEIGHT: 235 LBS | DIASTOLIC BLOOD PRESSURE: 84 MMHG | HEART RATE: 81 BPM | TEMPERATURE: 97.8 F | HEIGHT: 72 IN | SYSTOLIC BLOOD PRESSURE: 117 MMHG

## 2024-12-17 DIAGNOSIS — R55 VASOVAGAL NEAR SYNCOPE: Primary | ICD-10-CM

## 2024-12-17 LAB
ALBUMIN SERPL-MCNC: 2.9 G/DL (ref 3.4–5)
ALBUMIN/GLOB SERPL: 0.7 (ref 0.8–1.7)
ALP SERPL-CCNC: 125 U/L (ref 45–117)
ALT SERPL-CCNC: 116 U/L (ref 16–61)
ANION GAP SERPL CALC-SCNC: 3 MMOL/L (ref 3–18)
AST SERPL-CCNC: 53 U/L (ref 10–38)
BASOPHILS # BLD: 0 K/UL (ref 0–0.1)
BASOPHILS NFR BLD: 0 % (ref 0–2)
BILIRUB SERPL-MCNC: 0.7 MG/DL (ref 0.2–1)
BUN SERPL-MCNC: 17 MG/DL (ref 7–18)
BUN/CREAT SERPL: 15 (ref 12–20)
CALCIUM SERPL-MCNC: 8.8 MG/DL (ref 8.5–10.1)
CHLORIDE SERPL-SCNC: 105 MMOL/L (ref 100–111)
CO2 SERPL-SCNC: 30 MMOL/L (ref 21–32)
CREAT SERPL-MCNC: 1.12 MG/DL (ref 0.6–1.3)
DIFFERENTIAL METHOD BLD: NORMAL
EKG ATRIAL RATE: 63 BPM
EKG DIAGNOSIS: NORMAL
EKG P AXIS: 62 DEGREES
EKG P-R INTERVAL: 158 MS
EKG Q-T INTERVAL: 432 MS
EKG QRS DURATION: 92 MS
EKG QTC CALCULATION (BAZETT): 442 MS
EKG R AXIS: 29 DEGREES
EKG T AXIS: 22 DEGREES
EKG VENTRICULAR RATE: 63 BPM
EOSINOPHIL # BLD: 0.2 K/UL (ref 0–0.4)
EOSINOPHIL NFR BLD: 3 % (ref 0–5)
ERYTHROCYTE [DISTWIDTH] IN BLOOD BY AUTOMATED COUNT: 11.9 % (ref 11.6–14.5)
GLOBULIN SER CALC-MCNC: 4.1 G/DL (ref 2–4)
GLUCOSE BLD STRIP.AUTO-MCNC: 182 MG/DL (ref 70–110)
GLUCOSE SERPL-MCNC: 180 MG/DL (ref 74–99)
HCT VFR BLD AUTO: 41.2 % (ref 36–48)
HGB BLD-MCNC: 14.9 G/DL (ref 13–16)
IMM GRANULOCYTES # BLD AUTO: 0 K/UL (ref 0–0.04)
IMM GRANULOCYTES NFR BLD AUTO: 0 % (ref 0–0.5)
LYMPHOCYTES # BLD: 1.9 K/UL (ref 0.9–3.6)
LYMPHOCYTES NFR BLD: 27 % (ref 21–52)
MAGNESIUM SERPL-MCNC: 2 MG/DL (ref 1.6–2.6)
MCH RBC QN AUTO: 31.4 PG (ref 24–34)
MCHC RBC AUTO-ENTMCNC: 36.2 G/DL (ref 31–37)
MCV RBC AUTO: 86.7 FL (ref 78–100)
MONOCYTES # BLD: 0.3 K/UL (ref 0.05–1.2)
MONOCYTES NFR BLD: 5 % (ref 3–10)
NEUTS SEG # BLD: 4.4 K/UL (ref 1.8–8)
NEUTS SEG NFR BLD: 65 % (ref 40–73)
NRBC # BLD: 0 K/UL (ref 0–0.01)
NRBC BLD-RTO: 0 PER 100 WBC
NT PRO BNP: 50 PG/ML (ref 0–900)
PLATELET # BLD AUTO: 174 K/UL (ref 135–420)
PMV BLD AUTO: 9.9 FL (ref 9.2–11.8)
POTASSIUM SERPL-SCNC: 4.1 MMOL/L (ref 3.5–5.5)
PROT SERPL-MCNC: 7 G/DL (ref 6.4–8.2)
RBC # BLD AUTO: 4.75 M/UL (ref 4.35–5.65)
SODIUM SERPL-SCNC: 138 MMOL/L (ref 136–145)
TROPONIN I SERPL HS-MCNC: 5 NG/L (ref 0–78)
TROPONIN I SERPL HS-MCNC: 5 NG/L (ref 0–78)
WBC # BLD AUTO: 6.8 K/UL (ref 4.6–13.2)

## 2024-12-17 PROCEDURE — 96360 HYDRATION IV INFUSION INIT: CPT

## 2024-12-17 PROCEDURE — 83880 ASSAY OF NATRIURETIC PEPTIDE: CPT

## 2024-12-17 PROCEDURE — 82962 GLUCOSE BLOOD TEST: CPT

## 2024-12-17 PROCEDURE — 85025 COMPLETE CBC W/AUTO DIFF WBC: CPT

## 2024-12-17 PROCEDURE — 97606 NEG PRS WND THER DME>50 SQCM: CPT

## 2024-12-17 PROCEDURE — 93005 ELECTROCARDIOGRAM TRACING: CPT | Performed by: EMERGENCY MEDICINE

## 2024-12-17 PROCEDURE — 84484 ASSAY OF TROPONIN QUANT: CPT

## 2024-12-17 PROCEDURE — 80053 COMPREHEN METABOLIC PANEL: CPT

## 2024-12-17 PROCEDURE — 99285 EMERGENCY DEPT VISIT HI MDM: CPT

## 2024-12-17 PROCEDURE — 83735 ASSAY OF MAGNESIUM: CPT

## 2024-12-17 PROCEDURE — 36415 COLL VENOUS BLD VENIPUNCTURE: CPT

## 2024-12-17 PROCEDURE — 2580000003 HC RX 258: Performed by: EMERGENCY MEDICINE

## 2024-12-17 PROCEDURE — 71045 X-RAY EXAM CHEST 1 VIEW: CPT

## 2024-12-17 RX ORDER — 0.9 % SODIUM CHLORIDE 0.9 %
1000 INTRAVENOUS SOLUTION INTRAVENOUS ONCE
Status: COMPLETED | OUTPATIENT
Start: 2024-12-17 | End: 2024-12-17

## 2024-12-17 RX ADMIN — SODIUM CHLORIDE 1000 ML: 900 INJECTION, SOLUTION INTRAVENOUS at 16:55

## 2024-12-17 ASSESSMENT — PAIN SCALES - GENERAL: PAINLEVEL_OUTOF10: 3

## 2024-12-17 ASSESSMENT — PAIN DESCRIPTION - LOCATION: LOCATION: FOOT

## 2024-12-17 ASSESSMENT — PAIN - FUNCTIONAL ASSESSMENT: PAIN_FUNCTIONAL_ASSESSMENT: NONE - DENIES PAIN

## 2024-12-17 ASSESSMENT — ENCOUNTER SYMPTOMS
ALLERGIC/IMMUNOLOGIC NEGATIVE: 1
SHORTNESS OF BREATH: 0
GASTROINTESTINAL NEGATIVE: 1

## 2024-12-17 ASSESSMENT — PAIN DESCRIPTION - ORIENTATION: ORIENTATION: RIGHT

## 2024-12-17 NOTE — ED TRIAGE NOTES
Patient arrives via rapid response for a syncopal episode. Patient was in wound care when he became diaphoretic, anxious, and began to profusely sweat. Patient is a diabetic, but his BGL was 182 at the rapid response.     Patient has a wound vac on the right foot. Patient states that he became this way by looking at his foot.     Hx of diabetes.    AxOx4.

## 2024-12-17 NOTE — FLOWSHEET NOTE
12/17/24 1600   Anesthetic   Anesthetic None   Wound   Glucose 182   Negative Pressure Wound Therapy Foot Right;Dorsal;Lateral   Placement Date/Time: 12/09/24 1108   Present on Admission/Arrival: Yes  Location: Foot  Wound Location Orientation: Right;Dorsal;Lateral   Dressing Status Removed (comment # of pieces)  (2)   Canister changed? Yes   Output (ml) 10 ml   Unit Type activac   Mode Continuous   Target Pressure (mmHg) 125   Intensity Low   Number of pieces placed 1   Dressing Type Black foam   Incision 12/04/24 Foot Right   Date First Assessed/Time First Assessed: 12/04/24 2046   Present on Original Admission: No  Location: Foot  Incision Location Orientation: Right   Wound Image    Dressing Status New dressing applied   Incision Cleansed Cleansed with saline   Dressing/Treatment Negative pressure wound therapy   Incision Length (cm) 7   Incision Width (cm) 8.7 cm   Closure Staples;Sutures   Margins Other (Comment)  (open with graft)   Incision Assessment Other (Comment);Dry  (graft in place)   Drainage Amount Scant (moist but unmeasurable)   Drainage Description Serosanguinous   Odor None   Rancho-incision Assessment Intact;Hemosiderin staining (brown yellow)     During assessment by MD patient experienced altered mental status.  Lips became pale and patient was only making guteral noises.  Visiter emergency was called blood sugar was taken and found to be 182.  Hospital medic came and assessed as well as nursing supervisor.  Decision was made to take patient to the ED.  BP in room extremely low at 52/38.  Hand off given to ED nurse at bedside.  Negative Pressure Wound Therapy    NAME:  Milton Hughes  YOB: 1970  MEDICAL RECORD NUMBER:  [id number]  DATE:  12/17/2024    Applied Negative Pressure to right foot  wound.  [x] Applied skin barrier prep to rancho-wound.   [x] Cut strips of plastic drape to picture frame wound so that rancho-wound is     covered with the drape.   [x] Cut sponge, gauze or

## 2024-12-17 NOTE — ED NOTES
Repeat troponin sent to the lab.     BP increasing therapeutically.     Patient resting on the stretcher at this time.     Chest rise and fall noted. VSS.     No further complaints at this time.     Patient instructed to utilize the \"call bell\" if any assistance is needed.

## 2024-12-17 NOTE — OP NOTE
mmHg.    DRAINS:  None.        NASH LUTZ/LENKA  D:  12/17/2024 11:14:42  T:  12/17/2024 12:09:30  JOB #:  [id number]

## 2024-12-17 NOTE — ED PROVIDER NOTES
[facility] EMERGENCY DEPT  EMERGENCY DEPARTMENT ENCOUNTER      Pt Name: Milton Hughes  MRN: [id number]  Birthdate 1970  Date of evaluation: 12/17/2024  Provider: LORRI Nunez  6:58 PM    CHIEF COMPLAINT       Chief Complaint   Patient presents with    Loss of Consciousness         HISTORY OF PRESENT ILLNESS    Milton Hughes is a 54 y.o. male who presents to the emergency department with feeling like he was going to pass out during right foot wound caare visit at wound clinic.  Diabetic foot/osteo wound vac change visit and became anxious, diaphoretic, ad BP dropped.  BS good.   FFT called; denies CP, SOB, abd pain, n/v/d, melena, hematochezia.  Had blurry vision which has resolved.  Denies numbness, weakness.    HPI    Nursing Notes were reviewed.    REVIEW OF SYSTEMS       Review of Systems   Constitutional:  Positive for diaphoresis.   HENT: Negative.     Eyes:  Positive for visual disturbance.   Respiratory:  Negative for shortness of breath.    Cardiovascular:  Negative for chest pain.   Gastrointestinal: Negative.    Endocrine: Negative.    Genitourinary: Negative.    Musculoskeletal: Negative.    Skin: Negative.    Allergic/Immunologic: Negative.    Neurological:  Positive for light-headedness. Negative for syncope, weakness and numbness.   Hematological: Negative.    Psychiatric/Behavioral: Negative.         Except as noted above the remainder of the review of systems was reviewed and negative.       PAST MEDICAL HISTORY     Past Medical History:   Diagnosis Date    Asthma     DM (diabetes mellitus) ([facility]) 02/29/2016    Other ill-defined conditions(799.89) neck injury         SURGICAL HISTORY       Past Surgical History:   Procedure Laterality Date    CHEST SURGERY      FOOT DEBRIDEMENT Right 12/4/2024    RIGHT FOOT WOUND CLOSURE AND  DEBRIDEMENT, APPLICATION OF THERASKIN AND STRAVIX PL performed by Cecilio Salazar DPM at [facility] MAIN OR    TOE AMPUTATION Right 12/1/2024    RIGHT FIFTH TOE AMPUTATION AND  other labs were within normal range or not returned as of this dictation.    EMERGENCY DEPARTMENT COURSE and DIFFERENTIAL DIAGNOSIS/MDM:   Vitals:    Vitals:    12/17/24 1815 12/17/24 1830 12/17/24 1845 12/17/24 1846   BP: (!) 126/59 122/64 106/88    Pulse: 88 83 87 78   Resp: 18   19   Temp:       TempSrc:       SpO2: 100% 100% 98% 99%   Weight:       Height:           Likely near syncope due to vagal response.  Feels much better s/p IVF, rest.  Was admittedly anxious and nauseated w/wound exposed.  F/u with wound clinic.  Two trops neg; no arrhythmia on EKG.  Doubt ACS.  VS improved with single liter    D/c.    Medical Decision Making  Amount and/or Complexity of Data Reviewed  Labs: ordered.  Radiology: ordered.  ECG/medicine tests: ordered.    Risk  Prescription drug management.            REASSESSMENT     ED Course as of 12/17/24 1858   Tue Dec 17, 2024   1654 EKG interpretation: (Preliminary)  Rhythm: normal sinus rhythm; and regular . Rate (approx.): 63; Axis: normal; P wave: normal; QRS interval: normal ; ST/T wave: normal;  was interpreted by David Mathew MD,ED Provider.   [BN]      ED Course User Index  [BN] David Mathew MD         FINAL IMPRESSION      1. Vasovagal near syncope          DISPOSITION/PLAN   DISPOSITION Decision To Discharge 12/17/2024 06:53:55 PM   DISPOSITION CONDITION Stable       I completed a structured, evidence-based clinical evaluation to screen for acute stroke and neurologic deficits in this patient.  The patient has a normal detailed neurologic exam, which is highly sensitive for dangerous causes of dizziness, vertigo, or  loss of balance.  The evidence indicates that the patient is very low risk for an acute neurologic emergency, and this is consistent with my  clinical intuition. The risk of further workup or hospitalization is likely higher than the risk of the patient having a stroke or other  dangerous neurologic condition. It is, therefore, in the patient’s

## 2024-12-24 ENCOUNTER — HOSPITAL ENCOUNTER (OUTPATIENT)
Facility: HOSPITAL | Age: 54
Discharge: HOME | End: 2024-12-24
Attending: PODIATRIST | Admitting: PODIATRIST
Payer: MEDICAID

## 2024-12-24 VITALS
RESPIRATION RATE: 20 BRPM | TEMPERATURE: 98.1 F | SYSTOLIC BLOOD PRESSURE: 95 MMHG | DIASTOLIC BLOOD PRESSURE: 63 MMHG | HEART RATE: 113 BPM

## 2024-12-24 PROCEDURE — 97606 NEG PRS WND THER DME>50 SQCM: CPT

## 2024-12-24 NOTE — FLOWSHEET NOTE
12/24/24 1346   Negative Pressure Wound Therapy Foot Right;Dorsal;Lateral   Placement Date/Time: 12/09/24 1108   Present on Admission/Arrival: Yes  Location: Foot  Wound Location Orientation: Right;Dorsal;Lateral   Dressing Status Removed (comment # of pieces)  (1)   Canister changed? Yes   Output (ml) 10 ml   Unit Type activac   Mode Continuous   Target Pressure (mmHg) 125   Intensity Low   $$ Dressing Changed & Charged $ Standard NPWT >50 sq cm PER TX   Number of pieces placed 1   Dressing Type Other (Comment)  (silver)   Dressing Change Due 12/30/24   Incision 12/04/24 Foot Right   Date First Assessed/Time First Assessed: 12/04/24 2046   Present on Original Admission: No  Location: Foot  Incision Location Orientation: Right   Wound Image     Dressing Status New dressing applied   Dressing Change Due 12/30/24   Incision Cleansed Wound cleanser   Dressing/Treatment Negative pressure wound therapy   Incision Length (cm) 9.5   Incision Width (cm) 8 cm   Incision Depth (cm) 0.9 cm   Incision Volume (cm^3) 68.4 cm^3   Closure Staples;Sutures   Margins Other (Comment)   Incision Assessment Devitalized tissue   Drainage Amount Scant (moist but unmeasurable)   Drainage Description Serosanguinous   Odor None   Haily-incision Assessment Intact   Isaiah Scale   Sensory Perceptions 3   Moisture 4   Activity 3   Mobility 3   Nutrition 3   Friction and Shear 3   Isaiah Scale Score 19   Wound Follow Up   Require Follow Up Yes

## 2024-12-24 NOTE — WOUND CARE
Debridement Wound Care        Problem List Items Addressed This Visit    None      Procedure Note  Indications:  Based on my examination of this patient's wound(s)/ulcer(s) today, debridement is  required to promote healing and evaluate the wound base.    Performed by: Cecilio Salazar DPM    Consent obtained: Yes    Time out taken: Yes    Debridement: excisional    Using scissors, forceps, and tissue nippers the wound(s)/ulcer(s) was/were sharply debrided down through and including the removal of    epidermis, dermis, subcutaneous tissue, and muscle/fascia    Devitalized Tissue Debrided: fibrin, biofilm, slough, and necrotic/eschar    Pre Debridement Measurements:  Are located in the Wound/Ulcer Documentation Flow Sheet    Other post op amp ulcer    Wound/Ulcer #: 1    Post Debridement Measurements:  Wound/Ulcer Descriptions are Pre Debridement except measurements:    Wound Care Documentation:  Negative Pressure Wound Therapy Foot Right;Dorsal;Lateral (Active)   Dressing Status Removed (comment # of pieces) 12/17/24 1600   Canister changed? Yes 12/17/24 1600   Output (ml) 10 ml 12/17/24 1600   Unit Type activac 12/17/24 1600   Mode Continuous 12/17/24 1600   Target Pressure (mmHg) 125 12/17/24 1600   Intensity Low 12/17/24 1600   $$ Dressing Changed & Charged $ Standard NPWT >50 sq cm PER TX 12/17/24 1600   Number of pieces placed 1 12/17/24 1600   Dressing Type Black foam 12/17/24 1600   Dressing Change Due 12/24/24 12/17/24 1600   Number of days: 15         Total Surface Area Debrided:  64 sq cm     Estimated Blood Loss:  Minimal    Hemostasis Achieved: Pressure    Procedural Pain: 0 / 10     Post Procedural Pain: 0 / 10     Response to treatment: Well tolerated by patient  Follow up in the wound center in [facility] likely needs extended wound vac usage after initial 4 weeks rosina vac use

## 2024-12-30 ENCOUNTER — HOSPITAL ENCOUNTER (OUTPATIENT)
Facility: HOSPITAL | Age: 54
Discharge: HOME | End: 2024-12-30
Attending: SURGERY
Payer: MEDICAID

## 2024-12-30 ENCOUNTER — OFFICE VISIT (OUTPATIENT)
Age: 54
End: 2024-12-30
Payer: MEDICAID

## 2024-12-30 VITALS
SYSTOLIC BLOOD PRESSURE: 111 MMHG | RESPIRATION RATE: 18 BRPM | HEART RATE: 85 BPM | TEMPERATURE: 97.8 F | DIASTOLIC BLOOD PRESSURE: 57 MMHG

## 2024-12-30 VITALS
RESPIRATION RATE: 16 BRPM | WEIGHT: 234 LBS | DIASTOLIC BLOOD PRESSURE: 70 MMHG | SYSTOLIC BLOOD PRESSURE: 104 MMHG | BODY MASS INDEX: 31.69 KG/M2 | HEIGHT: 72 IN | TEMPERATURE: 98.5 F | OXYGEN SATURATION: 97 % | HEART RATE: 72 BPM

## 2024-12-30 DIAGNOSIS — E11.59 TYPE 2 DIABETES MELLITUS WITH OTHER CIRCULATORY COMPLICATION, WITH LONG-TERM CURRENT USE OF INSULIN (HCC): Primary | Chronic | ICD-10-CM

## 2024-12-30 DIAGNOSIS — Z89.421 STATUS POST AMPUTATION OF LESSER TOE OF RIGHT FOOT (HCC): ICD-10-CM

## 2024-12-30 DIAGNOSIS — I10 PRIMARY HYPERTENSION: ICD-10-CM

## 2024-12-30 DIAGNOSIS — L97.414 DIABETIC ULCER OF RIGHT MIDFOOT ASSOCIATED WITH TYPE 2 DIABETES MELLITUS, WITH NECROSIS OF BONE (HCC): Primary | ICD-10-CM

## 2024-12-30 DIAGNOSIS — E11.621 DIABETIC ULCER OF RIGHT MIDFOOT ASSOCIATED WITH TYPE 2 DIABETES MELLITUS, WITH NECROSIS OF BONE (HCC): Primary | ICD-10-CM

## 2024-12-30 DIAGNOSIS — E08.42 DIABETIC POLYNEUROPATHY ASSOCIATED WITH DIABETES MELLITUS DUE TO UNDERLYING CONDITION (HCC): Chronic | ICD-10-CM

## 2024-12-30 DIAGNOSIS — Z09 HOSPITAL DISCHARGE FOLLOW-UP: ICD-10-CM

## 2024-12-30 DIAGNOSIS — Z79.4 TYPE 2 DIABETES MELLITUS WITH OTHER CIRCULATORY COMPLICATION, WITH LONG-TERM CURRENT USE OF INSULIN (HCC): Primary | Chronic | ICD-10-CM

## 2024-12-30 DIAGNOSIS — Z76.89 ENCOUNTER TO ESTABLISH CARE WITH NEW DOCTOR: ICD-10-CM

## 2024-12-30 PROCEDURE — 99204 OFFICE O/P NEW MOD 45 MIN: CPT | Performed by: NURSE PRACTITIONER

## 2024-12-30 PROCEDURE — 11043 DBRDMT MUSC&/FSCA 1ST 20/<: CPT

## 2024-12-30 PROCEDURE — 3046F HEMOGLOBIN A1C LEVEL >9.0%: CPT | Performed by: NURSE PRACTITIONER

## 2024-12-30 PROCEDURE — 99213 OFFICE O/P EST LOW 20 MIN: CPT

## 2024-12-30 PROCEDURE — 3074F SYST BP LT 130 MM HG: CPT | Performed by: NURSE PRACTITIONER

## 2024-12-30 PROCEDURE — 3078F DIAST BP <80 MM HG: CPT | Performed by: NURSE PRACTITIONER

## 2024-12-30 PROCEDURE — 99203 OFFICE O/P NEW LOW 30 MIN: CPT | Performed by: SURGERY

## 2024-12-30 PROCEDURE — 11043 DBRDMT MUSC&/FSCA 1ST 20/<: CPT | Performed by: SURGERY

## 2024-12-30 PROCEDURE — 11046 DBRDMT MUSC&/FSCA EA ADDL: CPT | Performed by: SURGERY

## 2024-12-30 PROCEDURE — 97606 NEG PRS WND THER DME>50 SQCM: CPT

## 2024-12-30 RX ORDER — TRIAMCINOLONE ACETONIDE 1 MG/G
OINTMENT TOPICAL ONCE
Status: CANCELLED | OUTPATIENT
Start: 2024-12-30 | End: 2024-12-30

## 2024-12-30 RX ORDER — CLOBETASOL PROPIONATE 0.5 MG/G
OINTMENT TOPICAL ONCE
Status: CANCELLED | OUTPATIENT
Start: 2024-12-30 | End: 2024-12-30

## 2024-12-30 RX ORDER — GENTAMICIN SULFATE 1 MG/G
OINTMENT TOPICAL ONCE
Status: CANCELLED | OUTPATIENT
Start: 2024-12-30 | End: 2024-12-30

## 2024-12-30 RX ORDER — LIDOCAINE 40 MG/G
CREAM TOPICAL ONCE
Status: CANCELLED | OUTPATIENT
Start: 2024-12-30 | End: 2024-12-30

## 2024-12-30 RX ORDER — GLIPIZIDE 10 MG/1
10 TABLET ORAL 2 TIMES DAILY
Qty: 60 TABLET | Refills: 3 | Status: SHIPPED | OUTPATIENT
Start: 2024-12-30

## 2024-12-30 RX ORDER — BETAMETHASONE DIPROPIONATE 0.5 MG/G
CREAM TOPICAL ONCE
Status: CANCELLED | OUTPATIENT
Start: 2024-12-30 | End: 2024-12-30

## 2024-12-30 RX ORDER — LIDOCAINE HYDROCHLORIDE 20 MG/ML
JELLY TOPICAL ONCE
Status: CANCELLED | OUTPATIENT
Start: 2024-12-30 | End: 2024-12-30

## 2024-12-30 RX ORDER — LIDOCAINE HYDROCHLORIDE 40 MG/ML
SOLUTION TOPICAL ONCE
Status: CANCELLED | OUTPATIENT
Start: 2024-12-30 | End: 2024-12-30

## 2024-12-30 RX ORDER — GABAPENTIN 300 MG/1
300 CAPSULE ORAL 3 TIMES DAILY
Qty: 90 CAPSULE | Refills: 0 | Status: SHIPPED | OUTPATIENT
Start: 2024-12-30 | End: 2025-03-30

## 2024-12-30 RX ORDER — GINSENG 100 MG
CAPSULE ORAL ONCE
Status: CANCELLED | OUTPATIENT
Start: 2024-12-30 | End: 2024-12-30

## 2024-12-30 RX ORDER — LIDOCAINE 50 MG/G
OINTMENT TOPICAL ONCE
Status: CANCELLED | OUTPATIENT
Start: 2024-12-30 | End: 2024-12-30

## 2024-12-30 RX ORDER — NEOMYCIN/BACITRACIN/POLYMYXINB 3.5-400-5K
OINTMENT (GRAM) TOPICAL ONCE
Status: CANCELLED | OUTPATIENT
Start: 2024-12-30 | End: 2024-12-30

## 2024-12-30 RX ORDER — BACITRACIN ZINC AND POLYMYXIN B SULFATE 500; 1000 [USP'U]/G; [USP'U]/G
OINTMENT TOPICAL ONCE
Status: CANCELLED | OUTPATIENT
Start: 2024-12-30 | End: 2024-12-30

## 2024-12-30 RX ORDER — SILVER SULFADIAZINE 10 MG/G
CREAM TOPICAL ONCE
Status: CANCELLED | OUTPATIENT
Start: 2024-12-30 | End: 2024-12-30

## 2024-12-30 RX ORDER — MUPIROCIN 20 MG/G
OINTMENT TOPICAL ONCE
Status: CANCELLED | OUTPATIENT
Start: 2024-12-30 | End: 2024-12-30

## 2024-12-30 RX ORDER — SODIUM CHLOR/HYPOCHLOROUS ACID 0.033 %
SOLUTION, IRRIGATION IRRIGATION ONCE
Status: CANCELLED | OUTPATIENT
Start: 2024-12-30 | End: 2024-12-30

## 2024-12-30 SDOH — ECONOMIC STABILITY: FOOD INSECURITY: WITHIN THE PAST 12 MONTHS, YOU WORRIED THAT YOUR FOOD WOULD RUN OUT BEFORE YOU GOT MONEY TO BUY MORE.: NEVER TRUE

## 2024-12-30 SDOH — ECONOMIC STABILITY: FOOD INSECURITY: WITHIN THE PAST 12 MONTHS, THE FOOD YOU BOUGHT JUST DIDN'T LAST AND YOU DIDN'T HAVE MONEY TO GET MORE.: NEVER TRUE

## 2024-12-30 SDOH — ECONOMIC STABILITY: INCOME INSECURITY: HOW HARD IS IT FOR YOU TO PAY FOR THE VERY BASICS LIKE FOOD, HOUSING, MEDICAL CARE, AND HEATING?: NOT HARD AT ALL

## 2024-12-30 ASSESSMENT — PATIENT HEALTH QUESTIONNAIRE - PHQ9
2. FEELING DOWN, DEPRESSED OR HOPELESS: NOT AT ALL
SUM OF ALL RESPONSES TO PHQ9 QUESTIONS 1 & 2: 0
SUM OF ALL RESPONSES TO PHQ QUESTIONS 1-9: 0
SUM OF ALL RESPONSES TO PHQ QUESTIONS 1-9: 0
1. LITTLE INTEREST OR PLEASURE IN DOING THINGS: NOT AT ALL
SUM OF ALL RESPONSES TO PHQ QUESTIONS 1-9: 0
SUM OF ALL RESPONSES TO PHQ QUESTIONS 1-9: 0

## 2024-12-30 ASSESSMENT — PAIN DESCRIPTION - ORIENTATION: ORIENTATION: RIGHT

## 2024-12-30 ASSESSMENT — PAIN DESCRIPTION - LOCATION: LOCATION: FOOT

## 2024-12-30 ASSESSMENT — PAIN SCALES - GENERAL: PAINLEVEL_OUTOF10: 4

## 2024-12-30 ASSESSMENT — PAIN DESCRIPTION - DESCRIPTORS: DESCRIPTORS: THROBBING

## 2024-12-30 NOTE — PROGRESS NOTES
Milton Hughes (:  1970) is a 54 y.o. male,New patient, here for evaluation of the following chief complaint(s):         1. Type 2 diabetes mellitus with other circulatory complication, with long-term current use of insulin ([facility])  Comments:  uncontrolled  increase insulin to 30 u BID  glipizide 10 mg bid   monitor BID  diab educ refer  Orders:  -     gabapentin (NEURONTIN) 300 MG capsule; Take 1 capsule by mouth 3 times daily for 90 days. Intended supply: 90 days Max Daily Amount: 900 mg, Disp-90 capsule, R-0Normal  -     glipiZIDE (GLUCOTROL) 10 MG tablet; Take 1 tablet by mouth 2 times daily, Disp-60 tablet, R-3Normal  -     [facility] ([address])  2. Encounter to establish care with new doctor  Comments:  reviewed labs and notes  3. Hospital discharge follow-up  Comments:  reviewed notes in chart  4. Primary hypertension  Comments:  stable and at goal  cont lisinopril  discussed monitoring at home  f/u one week and may decrease lisinopril  5. Diabetic polyneuropathy associated with diabetes mellitus due to underlying condition ([facility])  Comments:  uncontrolled  start gabapentin 300 mg TID  Orders:  -     gabapentin (NEURONTIN) 300 MG capsule; Take 1 capsule by mouth 3 times daily for 90 days. Intended supply: 90 days Max Daily Amount: 900 mg, Disp-90 capsule, R-0Normal  6. 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     Reviewed most recent labs in the patients chart from 24.    Ideally would like to start patient on Ozempic however BG levels are too high at this time and he may need a sliding scale.     Return in about 1 week (around 2025) for MD visit, medication f/u, virtual.       Subjective     Patient is new to the practice

## 2024-12-30 NOTE — PROGRESS NOTES
Chief Complaint   Patient presents with    New Patient         Health Maintenance Due   Topic Date Due    Pneumococcal 0-64 years Vaccine (1 of 2 - PCV) Never done    HIV screen  Never done    Diabetic Alb to Cr ratio (uACR) test  Never done    Diabetic retinal exam  Never done    Hepatitis C screen  Never done    Hepatitis B vaccine (1 of 3 - 19+ 3-dose series) Never done    Colorectal Cancer Screen  Never done    Shingles vaccine (1 of 2) Never done    DTaP/Tdap/Td vaccine (1 - Tdap) 03/27/2024    Flu vaccine (1) Never done    COVID-19 Vaccine (1 - 2023-24 season) Never done         \"Have you been to the ER, urgent care clinic since your last visit?  Hospitalized since your last visit?\"    Hospital stay 2 weeks    “Have you seen or consulted any other health care providers outside of [facility] since your last visit?”    Podiatrist,  Wound care    “Have you had a colorectal cancer screening such as a colonoscopy/FIT/Cologuard?    NO    No colonoscopy on file  No cologuard on file  No FIT/FOBT on file   No flexible sigmoidoscopy on file

## 2024-12-30 NOTE — DISCHARGE INSTRUCTIONS
Discharge Instructions [facility]  [address]   [address], [address]  [place] [address]   Telephone: [phone]     FAX [phone]    NAME:  Milton Hughes  YOB: 1970  MEDICAL RECORD NUMBER:  [id number]  DATE:  12/30/2024  CPT:E&M-Level 3 (30675) and Debridement muscle/fascia (20971)  WOUND CARE ORDERS:  Right foot :Cleanse with soap and water, apply primary dressing Collagen on exposed tendon and apply black foam on the wound and wound  vac continuous @125 mm Hg make sure to protect the rancho wound with drape .  Apply Ace wrap looselyto hold the dressing making sure tubing do not touch or have pressure  the patient  Pt./pcg/HH nurse to change (freq) 2x Weekly and as needed for compromise.F/U in 1 week.   [] Wound culture send  [] Antibiotic prescribe to the pharmacy   Home Health Agency:  Referral to be send    If you are still having pain after you go home:  [x] Elevate the affected limb.   [x] Use over-the-counter medications you would normally use for pain as permitted by your family doctor.  [x] For persistent pain not relieved by the above interventions, please call your family doctor.     TREATMENT ORDERS:    Elevate leg(s) above the level of the heart when sitting.   Avoid prolonged standing in one place.  Do no get dressing/wrap wet.  Follow Diet as prescribed:   [x] Diet as tolerated: [x] Calorie Diabetic Diet: Low carb and no Sugar [] No Added Salt: no more then 2,000 mg daily  [] Increase Protein: [] Limit the amount of liquid you are drinking and avoid drinking in between meals (limit to 2 quarts daily)  Return Appointment:  [x] Return Appointment: With Dr. Berry  in  1 Week(s)  [] Nurse Visit :  [] Ordered tests:    Electronically signed MADINA MANZANARES RN on 12/30/2024 at 9:59 AM     Wound Care Center Information: Should you experience any significant changes in your wound(s) or have questions about your wound care, please contact the [facility]

## 2024-12-30 NOTE — PROGRESS NOTES
Wound care    The patient is a 54-year-old man who was referred to the wound care center regarding a wound on the right foot.  The patient has history of diabetes mellitus and had several operative debridements of the right foot including amputation of the right fifth toe, with the last surgery being on 12/4/2024 at [facility].  The patient was discharged in the hospital on 12/12/2024.  He has had a wound VAC in place.    The patient was first seen at the [facility] wound care center on 12/30/2024.    The patient has history of diabetes mellitus which is not fully controlled.  Hemoglobin A1c was 10.3 on 11/30/2024.  There has been some confusion about his ordered diabetic medications.  He describes not consistently following a diabetic diet.  He does not describe anginal symptoms or have history of MI or coronary intervention.  He does not describe dyspnea.    Past medical history includes hyperlipidemia, obesity, peripheral artery disease, thrombocytopenia.        Reported weight 235 pounds height 6 feet    Physical examination    Vital signs blood pressure 111/57 pulse 85 temperature 97.8 respirations 18    The patient is alert man in no acute distress.    Head and neck examination showed no jaundice.  Lungs are clear bilaterally without rales rhonchi or wheezes.  Heart is regular without murmur gallop or rub.  The patient is alert and oriented.  He moves all extremities.  Facial movement is symmetrical.  Speech is normal.    Examination of the right lower extremity revealed 2+ dorsalis pedis pulse.  There was trace edema in the right lower leg and foot.  There was surgical absence of the fifth toe.  There was an open wound on the dorsal aspect of the foot extending from the site of the amputated fifth toe proximally with dimensions 9 x 6.4 x 0.6 cm.  The base of the wound had granulation tissue and mild slough and areas of exposed tendon and fascial elements.    The wound was debrided with forceps and 15 blade scalpel,

## 2024-12-30 NOTE — PROGRESS NOTES
Face to Face Documentation for Home Health Care      Patient’s Name: Milton Hughes    YOB: 1970    Date of Face to Face:  12/30/2024                                    Face to Face Encounter findings are related to primary reason for home care:   yes    I certify that this patient is under my care and has a Face to Face Encounter related to the primary reason for home health.    1. I certify that the patient needs intermittent skilled nursing care for wound care.    2. I certify that this patient is homebound for the following reason(s): Requires considerable and taxing effort to leave the home  and Requires the assistance of 1 or more persons to leave the home     3. 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        Herb Berry MD 12/30/2024 11:06 AM

## 2024-12-30 NOTE — FLOWSHEET NOTE
12/30/24 0930   Wound 12/30/24 Foot Right # 1   Date First Assessed/Time First Assessed: 12/30/24 0935   Present on Original Admission: Yes  Wound Approximate Age at First Assessment (Weeks): 4 weeks  Primary Wound Type: Diabetic Ulcer  Location: Foot  Wound Location Orientation: Right  Wound Descr...   Wound Image    Wound Etiology Diabetic   Dressing Status Old drainage noted   Wound Cleansed Soap and water   Wound Length (cm) 9 cm   Wound Width (cm) 6.4 cm   Wound Depth (cm) 0.6 cm   Wound Surface Area (cm^2) 57.6 cm^2   Wound Volume (cm^3) 34.56 cm^3   Wound Assessment Slough;Pink/red   Drainage Amount Large (50-75% saturated)   Drainage Description Serosanguinous   Odor None   Haily-wound Assessment Blanchable erythema   Margins Undefined edges   Wound Thickness Description not for Pressure Injury Full thickness   Pain Assessment   Pain Assessment 0-10   Pain Level 4   Pain Location Foot   Pain Orientation Right   Pain Descriptors Throbbing     BP (!) 111/57   Pulse 85   Temp 97.8 °F (36.6 °C) (Temporal)   Resp 18

## 2024-12-30 NOTE — FLOWSHEET NOTE
Negative Pressure    NAME:  Milton Hughes  YOB: 1970  MEDICAL RECORD NUMBER:  [id number]  DATE:  12/30/2024 12/30/24 1034   Negative Pressure Wound Therapy Foot Right;Dorsal;Lateral   Placement Date/Time: 12/09/24 1108   Present on Admission/Arrival: Yes  Location: Foot  Wound Location Orientation: Right;Dorsal;Lateral   Dressing Status Intact w/seal   Canister changed? Yes   Unit Type activac   Mode Continuous   Target Pressure (mmHg) 125   $$ Dressing Changed & Charged $ Standard NPWT >50 sq cm PER TX   Dressing Type Black foam   Wound 12/30/24 Foot Right # 1   Date First Assessed/Time First Assessed: 12/30/24 0935   Present on Original Admission: Yes  Wound Approximate Age at First Assessment (Weeks): 4 weeks  Primary Wound Type: Diabetic Ulcer  Location: Foot  Wound Location Orientation: Right  Wound Descr...   Dressing Status New dressing applied   Dressing/Treatment Collagen  (Black foam @ 125 mm HG Continuous)       Applied Negative Pressure to Right foot wound(s)/ulcer(s).  [x] Applied skin barrier prep to rancho-wound.   [x] Cut strips of plastic drape to picture frame wound so that rancho-wound is     covered with the drape.   [] If bridging dressing to less prominent site, cover any intact skin that will come in contact with the Negative Pressure Therapy sponge, gauze or channel drain with plastic drape.  The sponge should never touch intact skin.   [x] Cut sponge, gauze or channel drain to size which will fit into the wound/ulcer bed without being forced.   [x] Be sure the sponge is large enough to hold the entire round plastic flange which is attached to the tubing.  Never allow flange to be larger than the sponge or it will produce suction damaging intact skin.  Total number of individual pieces of foam used within the wound bed: 1  [] If bridging the dressing away from the primary site, be sure the bridge leads to a piece of sponge large enough to hold the entire flange without

## 2024-12-30 NOTE — PATIENT INSTRUCTIONS
Measure your blood pressure twice a day -- in the morning before taking your medications and in the evening before going to bed.   Take at least two readings one minute apart each time.    For best results, sit comfortably with both feet on the floor for at least five minutes before taking a measurement. Sit calmly and don’t talk.    When taking your blood pressure, rest your arm on a table so the blood pressure cuff is at about the same height as your heart.    Record your blood pressure and show it to your health care professional at  every visit.    Increase your insulin to 30 units twice per day.     You can take 1000 mg of Tylenol every 6-8 hours as needed for your aches and pains.  Please do not take more than 4000 mg of Tylenol in a 24-hour period as this can have serious consequences on your liver.  You can  Tylenol over-the-counter and 500 mg that is the extra strength.       Patient states slammed finger in car door.  Pain to right middle finger.      Triage Assessment     Row Name 06/19/22 [id number]       Triage Assessment (Adult)    Airway WDL WDL       Respiratory WDL    Respiratory WDL WDL       Skin Circulation/Temperature WDL    Skin Circulation/Temperature WDL WDL       Cardiac WDL    Cardiac WDL WDL       Peripheral/Neurovascular WDL    Peripheral Neurovascular WDL WDL       Cognitive/Neuro/Behavioral WDL    Cognitive/Neuro/Behavioral WDL WDL

## 2024-12-31 PROBLEM — Z89.421 STATUS POST AMPUTATION OF LESSER TOE OF RIGHT FOOT (HCC): Status: ACTIVE | Noted: 2024-12-31

## 2024-12-31 ASSESSMENT — ENCOUNTER SYMPTOMS
ABDOMINAL PAIN: 0
VOMITING: 0
WHEEZING: 0
NAUSEA: 0
SHORTNESS OF BREATH: 0
CHEST TIGHTNESS: 0

## 2025-01-02 ENCOUNTER — HOSPITAL ENCOUNTER (OUTPATIENT)
Facility: HOSPITAL | Age: 55
Discharge: HOME | End: 2025-01-02
Payer: MEDICAID

## 2025-01-02 VITALS
TEMPERATURE: 97.7 F | DIASTOLIC BLOOD PRESSURE: 61 MMHG | RESPIRATION RATE: 18 BRPM | HEART RATE: 84 BPM | SYSTOLIC BLOOD PRESSURE: 108 MMHG

## 2025-01-02 DIAGNOSIS — L97.414 DIABETIC ULCER OF RIGHT MIDFOOT ASSOCIATED WITH TYPE 2 DIABETES MELLITUS, WITH NECROSIS OF BONE (HCC): Primary | ICD-10-CM

## 2025-01-02 DIAGNOSIS — E11.621 DIABETIC ULCER OF RIGHT MIDFOOT ASSOCIATED WITH TYPE 2 DIABETES MELLITUS, WITH NECROSIS OF BONE (HCC): Primary | ICD-10-CM

## 2025-01-02 PROCEDURE — 97606 NEG PRS WND THER DME>50 SQCM: CPT

## 2025-01-02 RX ORDER — LIDOCAINE 40 MG/G
CREAM TOPICAL ONCE
OUTPATIENT
Start: 2025-01-02 | End: 2025-01-02

## 2025-01-02 RX ORDER — GENTAMICIN SULFATE 1 MG/G
OINTMENT TOPICAL ONCE
OUTPATIENT
Start: 2025-01-02 | End: 2025-01-02

## 2025-01-02 RX ORDER — NEOMYCIN/BACITRACIN/POLYMYXINB 3.5-400-5K
OINTMENT (GRAM) TOPICAL ONCE
OUTPATIENT
Start: 2025-01-02 | End: 2025-01-02

## 2025-01-02 RX ORDER — BETAMETHASONE DIPROPIONATE 0.5 MG/G
CREAM TOPICAL ONCE
OUTPATIENT
Start: 2025-01-02 | End: 2025-01-02

## 2025-01-02 RX ORDER — LIDOCAINE HYDROCHLORIDE 40 MG/ML
SOLUTION TOPICAL ONCE
OUTPATIENT
Start: 2025-01-02 | End: 2025-01-02

## 2025-01-02 RX ORDER — BACITRACIN ZINC AND POLYMYXIN B SULFATE 500; 1000 [USP'U]/G; [USP'U]/G
OINTMENT TOPICAL ONCE
OUTPATIENT
Start: 2025-01-02 | End: 2025-01-02

## 2025-01-02 RX ORDER — LIDOCAINE HYDROCHLORIDE 20 MG/ML
JELLY TOPICAL ONCE
OUTPATIENT
Start: 2025-01-02 | End: 2025-01-02

## 2025-01-02 RX ORDER — SILVER SULFADIAZINE 10 MG/G
CREAM TOPICAL ONCE
OUTPATIENT
Start: 2025-01-02 | End: 2025-01-02

## 2025-01-02 RX ORDER — SODIUM CHLOR/HYPOCHLOROUS ACID 0.033 %
SOLUTION, IRRIGATION IRRIGATION ONCE
OUTPATIENT
Start: 2025-01-02 | End: 2025-01-02

## 2025-01-02 RX ORDER — TRIAMCINOLONE ACETONIDE 1 MG/G
OINTMENT TOPICAL ONCE
OUTPATIENT
Start: 2025-01-02 | End: 2025-01-02

## 2025-01-02 RX ORDER — GINSENG 100 MG
CAPSULE ORAL ONCE
OUTPATIENT
Start: 2025-01-02 | End: 2025-01-02

## 2025-01-02 RX ORDER — CLOBETASOL PROPIONATE 0.5 MG/G
OINTMENT TOPICAL ONCE
OUTPATIENT
Start: 2025-01-02 | End: 2025-01-02

## 2025-01-02 RX ORDER — LIDOCAINE 50 MG/G
OINTMENT TOPICAL ONCE
OUTPATIENT
Start: 2025-01-02 | End: 2025-01-02

## 2025-01-02 RX ORDER — MUPIROCIN 20 MG/G
OINTMENT TOPICAL ONCE
OUTPATIENT
Start: 2025-01-02 | End: 2025-01-02

## 2025-01-02 ASSESSMENT — PAIN DESCRIPTION - LOCATION: LOCATION: FOOT

## 2025-01-02 ASSESSMENT — PAIN SCALES - GENERAL: PAINLEVEL_OUTOF10: 3

## 2025-01-02 ASSESSMENT — PAIN DESCRIPTION - ORIENTATION: ORIENTATION: RIGHT

## 2025-01-02 ASSESSMENT — PAIN DESCRIPTION - DESCRIPTORS: DESCRIPTORS: ACHING

## 2025-01-02 NOTE — FLOWSHEET NOTE
Negative Pressure    NAME:  Milton Hughes  YOB: 1970  MEDICAL RECORD NUMBER:  [id number]  DATE:  1/2/2025  Patient in for nurse visit.  /61   Pulse 84   Temp 97.7 °F (36.5 °C) (Temporal)   Resp 18      01/02/25 0812   RLE Neurovascular Assessment   Capillary Refill Less than/Equal to 3 seconds;Greater than 3 seconds   Color Appropriate for Ethnicity   Temperature Warm   RLE Sensation  Decreased   Negative Pressure Wound Therapy Foot Right;Dorsal;Lateral   Placement Date/Time: 12/09/24 1108   Present on Admission/Arrival: Yes  Location: Foot  Wound Location Orientation: Right;Dorsal;Lateral   Dressing Status Intact w/seal   Canister changed? Yes   Output (ml) 10 ml   Unit Type activac   Mode Continuous   Target Pressure (mmHg) 125   $$ Dressing Changed & Charged $ Standard NPWT >50 sq cm PER TX   Number of pieces placed 1   Dressing Type Black foam   Wound 12/30/24 Foot Right # 1   Date First Assessed/Time First Assessed: 12/30/24 0935   Present on Original Admission: Yes  Wound Approximate Age at First Assessment (Weeks): 4 weeks  Primary Wound Type: Diabetic Ulcer  Location: Foot  Wound Location Orientation: Right  Wound Descr...   Dressing Status New dressing applied   Dressing/Treatment Collagen  (Black foam @ 125 mm Hg)   Wound Assessment Slough;Pink/red;Exposed structure tendon   Drainage Amount Large (50-75% saturated)   Drainage Description Serosanguinous   Odor None   Rancho-wound Assessment Blanchable erythema   Margins Undefined edges   Wound Thickness Description not for Pressure Injury Full thickness   Pain Assessment   Pain Assessment 0-10   Pain Level 3   Pain Location Foot   Pain Orientation Right   Pain Descriptors Aching       Applied Negative Pressure to Right foot  wound(s)/ulcer(s).  [x] Applied skin barrier prep to rancho-wound.   [x] Cut strips of plastic drape to picture frame wound so that rancho-wound is     covered with the drape.   [] If bridging dressing to less

## 2025-01-06 ENCOUNTER — TELEMEDICINE (OUTPATIENT)
Age: 55
End: 2025-01-06
Payer: MEDICAID

## 2025-01-06 DIAGNOSIS — Z79.4 TYPE 2 DIABETES MELLITUS WITH OTHER CIRCULATORY COMPLICATION, WITH LONG-TERM CURRENT USE OF INSULIN (HCC): Primary | ICD-10-CM

## 2025-01-06 DIAGNOSIS — I10 PRIMARY HYPERTENSION: ICD-10-CM

## 2025-01-06 DIAGNOSIS — E11.59 TYPE 2 DIABETES MELLITUS WITH OTHER CIRCULATORY COMPLICATION, WITH LONG-TERM CURRENT USE OF INSULIN (HCC): Primary | ICD-10-CM

## 2025-01-06 PROCEDURE — 99214 OFFICE O/P EST MOD 30 MIN: CPT | Performed by: NURSE PRACTITIONER

## 2025-01-06 RX ORDER — GLUCOSAMINE HCL/CHONDROITIN SU 500-400 MG
CAPSULE ORAL
Qty: 300 STRIP | Refills: 3 | Status: SHIPPED | OUTPATIENT
Start: 2025-01-06

## 2025-01-06 RX ORDER — LANCETS 30 GAUGE
EACH MISCELLANEOUS
Qty: 300 EACH | Refills: 3 | Status: SHIPPED | OUTPATIENT
Start: 2025-01-06

## 2025-01-06 RX ORDER — INSULIN ASPART 100 [IU]/ML
INJECTION, SOLUTION INTRAVENOUS; SUBCUTANEOUS
Qty: 5 ADJUSTABLE DOSE PRE-FILLED PEN SYRINGE | Refills: 3 | Status: SHIPPED | OUTPATIENT
Start: 2025-01-06

## 2025-01-06 ASSESSMENT — ENCOUNTER SYMPTOMS
CHEST TIGHTNESS: 0
SHORTNESS OF BREATH: 0
VOMITING: 0
ABDOMINAL PAIN: 0
NAUSEA: 0

## 2025-01-06 NOTE — PROGRESS NOTES
Chief Complaint   Patient presents with    Follow-up         Health Maintenance Due   Topic Date Due    Pneumococcal 0-64 years Vaccine (1 of 2 - PCV) Never done    HIV screen  Never done    Diabetic Alb to Cr ratio (uACR) test  Never done    Diabetic retinal exam  Never done    Hepatitis C screen  Never done    Hepatitis B vaccine (1 of 3 - 19+ 3-dose series) Never done    Colorectal Cancer Screen  Never done    Shingles vaccine (1 of 2) Never done    DTaP/Tdap/Td vaccine (1 - Tdap) 03/27/2024    Flu vaccine (1) Never done         \"Have you been to the ER, urgent care clinic since your last visit?  Hospitalized since your last visit?\"    NO    “Have you seen or consulted any other health care providers outside of [facility] since your last visit?”    NO    “Have you had a colorectal cancer screening such as a colonoscopy/FIT/Cologuard?    NO    No colonoscopy on file  No cologuard on file  No FIT/FOBT on file   No flexible sigmoidoscopy on file

## 2025-01-06 NOTE — ASSESSMENT & PLAN NOTE
Uncontrolled  Add mealtime insulin  Follow-up in 1 week after starts the mealtime insulin   Orders:    insulin aspart (NOVOLOG FLEXPEN) 100 UNIT/ML injection pen; Monitor Blood Glucose Levels 3 times daily before each meal 150-200 take 4 units, 201-250 take 6 units, 251-300 take 8 units, greater than 301 take 10 units.

## 2025-01-06 NOTE — PROGRESS NOTES
Milton Hughes, was evaluated through a synchronous (real-time) audio-video encounter. The patient (or guardian if applicable) is aware that this is a billable service, which includes applicable co-pays. This Virtual Visit was conducted with patient's (and/or legal guardian's) consent. Patient identification was verified, and a caregiver was present when appropriate.   The patient was located at Home: [address] [place] [id number]  Provider was located at Facility (Appt Dept): [facility], [address] [address]  Confirm you are appropriately licensed, registered, or certified to deliver care in the state where the patient is located as indicated above. If you are not or unsure, please re-schedule the visit: Yes, I confirm.     Milton Hughes (:  1970) is a Established patient, presenting virtually for evaluation of the following:      Below is the assessment and plan developed based on review of pertinent history, physical exam, labs, studies, and medications.     Assessment & Plan  Type 2 diabetes mellitus with other circulatory complication, with long-term current use of insulin (HCC)   Uncontrolled  Add mealtime insulin  Follow-up in 1 week after starts the mealtime insulin   Orders:    insulin aspart (NOVOLOG FLEXPEN) 100 UNIT/ML injection pen; Monitor Blood Glucose Levels 3 times daily before each meal 150-200 take 4 units, 201-250 take 6 units, 251-300 take 8 units, greater than 301 take 10 units.    Primary hypertension   Stable  Pt Stopped lisinopril  Cont to monitor blood pressure at home  Goal is less than 140/90 consistently           Return in about 1 week (around 2025) for MD visit, medication f/u, virtual.       Subjective   DM  BG levels 200-300  Not really controlled  Goes to DM education class on 24  Increased NPH   Will start on sliding scale in addition to the NPH  Eventually transition to long acting plus short-acting  May need to refer to

## 2025-01-06 NOTE — ASSESSMENT & PLAN NOTE
Stable  Pt Stopped lisinopril  Cont to monitor blood pressure at home  Goal is less than 140/90 consistently

## 2025-01-07 ENCOUNTER — HOSPITAL ENCOUNTER (OUTPATIENT)
Facility: HOSPITAL | Age: 55
Discharge: HOME | End: 2025-01-07
Payer: MEDICAID

## 2025-01-07 ENCOUNTER — FOLLOWUP TELEPHONE ENCOUNTER (OUTPATIENT)
Facility: HOSPITAL | Age: 55
End: 2025-01-07

## 2025-01-07 VITALS
DIASTOLIC BLOOD PRESSURE: 76 MMHG | TEMPERATURE: 97 F | HEART RATE: 84 BPM | RESPIRATION RATE: 18 BRPM | SYSTOLIC BLOOD PRESSURE: 121 MMHG

## 2025-01-07 DIAGNOSIS — E11.621 DIABETIC ULCER OF RIGHT MIDFOOT ASSOCIATED WITH TYPE 2 DIABETES MELLITUS, WITH NECROSIS OF BONE (HCC): Primary | ICD-10-CM

## 2025-01-07 DIAGNOSIS — L97.414 DIABETIC ULCER OF RIGHT MIDFOOT ASSOCIATED WITH TYPE 2 DIABETES MELLITUS, WITH NECROSIS OF BONE (HCC): Primary | ICD-10-CM

## 2025-01-07 PROCEDURE — 97605 NEG PRS WND THER DME<=50SQCM: CPT

## 2025-01-07 RX ORDER — LIDOCAINE 40 MG/G
CREAM TOPICAL ONCE
Status: CANCELLED | OUTPATIENT
Start: 2025-01-07 | End: 2025-01-07

## 2025-01-07 RX ORDER — SODIUM CHLOR/HYPOCHLOROUS ACID 0.033 %
SOLUTION, IRRIGATION IRRIGATION ONCE
Status: CANCELLED | OUTPATIENT
Start: 2025-01-07 | End: 2025-01-07

## 2025-01-07 RX ORDER — BACITRACIN ZINC AND POLYMYXIN B SULFATE 500; 1000 [USP'U]/G; [USP'U]/G
OINTMENT TOPICAL ONCE
Status: CANCELLED | OUTPATIENT
Start: 2025-01-07 | End: 2025-01-07

## 2025-01-07 RX ORDER — LIDOCAINE HYDROCHLORIDE 20 MG/ML
JELLY TOPICAL ONCE
Status: CANCELLED | OUTPATIENT
Start: 2025-01-07 | End: 2025-01-07

## 2025-01-07 RX ORDER — MUPIROCIN 20 MG/G
OINTMENT TOPICAL ONCE
Status: CANCELLED | OUTPATIENT
Start: 2025-01-07 | End: 2025-01-07

## 2025-01-07 RX ORDER — GENTAMICIN SULFATE 1 MG/G
OINTMENT TOPICAL ONCE
Status: CANCELLED | OUTPATIENT
Start: 2025-01-07 | End: 2025-01-07

## 2025-01-07 RX ORDER — NEOMYCIN/BACITRACIN/POLYMYXINB 3.5-400-5K
OINTMENT (GRAM) TOPICAL ONCE
Status: CANCELLED | OUTPATIENT
Start: 2025-01-07 | End: 2025-01-07

## 2025-01-07 RX ORDER — LIDOCAINE HYDROCHLORIDE 40 MG/ML
SOLUTION TOPICAL ONCE
Status: CANCELLED | OUTPATIENT
Start: 2025-01-07 | End: 2025-01-07

## 2025-01-07 RX ORDER — LIDOCAINE 50 MG/G
OINTMENT TOPICAL ONCE
Status: CANCELLED | OUTPATIENT
Start: 2025-01-07 | End: 2025-01-07

## 2025-01-07 RX ORDER — GINSENG 100 MG
CAPSULE ORAL ONCE
Status: CANCELLED | OUTPATIENT
Start: 2025-01-07 | End: 2025-01-07

## 2025-01-07 RX ORDER — CLOBETASOL PROPIONATE 0.5 MG/G
OINTMENT TOPICAL ONCE
Status: CANCELLED | OUTPATIENT
Start: 2025-01-07 | End: 2025-01-07

## 2025-01-07 RX ORDER — BETAMETHASONE DIPROPIONATE 0.5 MG/G
CREAM TOPICAL ONCE
Status: CANCELLED | OUTPATIENT
Start: 2025-01-07 | End: 2025-01-07

## 2025-01-07 RX ORDER — TRIAMCINOLONE ACETONIDE 1 MG/G
OINTMENT TOPICAL ONCE
Status: CANCELLED | OUTPATIENT
Start: 2025-01-07 | End: 2025-01-07

## 2025-01-07 RX ORDER — SILVER SULFADIAZINE 10 MG/G
CREAM TOPICAL ONCE
Status: CANCELLED | OUTPATIENT
Start: 2025-01-07 | End: 2025-01-07

## 2025-01-07 ASSESSMENT — PAIN DESCRIPTION - DESCRIPTORS: DESCRIPTORS: ACHING

## 2025-01-07 ASSESSMENT — PAIN DESCRIPTION - LOCATION: LOCATION: FOOT

## 2025-01-07 ASSESSMENT — PAIN DESCRIPTION - ORIENTATION: ORIENTATION: RIGHT

## 2025-01-07 ASSESSMENT — PAIN SCALES - GENERAL: PAINLEVEL_OUTOF10: 4

## 2025-01-07 NOTE — FLOWSHEET NOTE
01/07/25 1209   Wound 12/30/24 Foot Right # 1   Date First Assessed/Time First Assessed: 12/30/24 0935   Present on Original Admission: Yes  Wound Approximate Age at First Assessment (Weeks): 4 weeks  Primary Wound Type: Diabetic Ulcer  Location: Foot  Wound Location Orientation: Right  Wound Descr...   Dressing Status New dressing applied   Dressing/Treatment Collagen  (Black foam @ 125 mm Hg)     Negative Pressure    NAME:  Milton Hughes  YOB: 1970  MEDICAL RECORD NUMBER:  [id number]  DATE:  1/7/2025    Applied Negative Pressure to Right foot wound(s)/ulcer(s).  [x] Applied skin barrier prep to rancho-wound.   [x] Cut strips of plastic drape to picture frame wound so that rancho-wound is     covered with the drape.   [x] If bridging dressing to less prominent site, cover any intact skin that will come in contact with the Negative Pressure Therapy sponge, gauze or channel drain with plastic drape.  The sponge should never touch intact skin.   [x] Cut sponge, gauze or channel drain to size which will fit into the wound/ulcer bed without being forced.   [x] Be sure the sponge is large enough to hold the entire round plastic flange which is attached to the tubing.  Never allow flange to be larger than the sponge or it will produce suction damaging intact skin.  Total number of individual pieces of foam used within the wound bed: 1  [x] If bridging the dressing away from the primary site, be sure the bridge leads to a piece of sponge large enough to hold the entire flange without allowing any of the flange to overlap onto intact skin.   [x] Covered sponge, gauze or channel drain with plastic drape.   [x] Cut a hole in this plastic drape directly over the sponge the same size as the plastic drain tubing.   [x] Removed plastic liner from flange and apply it directly over the hole you cut.   [x] Removed the plastic cover from the flange.   [x] Attached the tubing to the wound/ulcer Negative Pressure Therapy

## 2025-01-07 NOTE — TELEPHONE ENCOUNTER
Patient referral was send to [facility]. The home health can't start due to pending Medicaid insurance.   CHAZ rodriguez Nurse  [phone] Left a message.  Called [facility], for rosina case until the insurance is active. Talked to Jenna  regarding the matter. [facility] take medicare patient and are not considering the case. Recommended [facility].  Called [facility] to see if they would consider taking patient until medicaid is active. CHAZ rodriguez would call the patient and see the updates on the pending medicaid and would call back.

## 2025-01-07 NOTE — FLOWSHEET NOTE
01/07/25 1141   RLE Neurovascular Assessment   Capillary Refill Less than/Equal to 3 seconds;Greater than 3 seconds   Color Appropriate for Ethnicity   Temperature Warm   RLE Sensation  Decreased   Negative Pressure Wound Therapy Foot Right;Dorsal;Lateral   Placement Date/Time: 12/09/24 1108   Present on Admission/Arrival: Yes  Location: Foot  Wound Location Orientation: Right;Dorsal;Lateral   Dressing Status Intact w/seal   Wound 12/30/24 Foot Right # 1   Date First Assessed/Time First Assessed: 12/30/24 0935   Present on Original Admission: Yes  Wound Approximate Age at First Assessment (Weeks): 4 weeks  Primary Wound Type: Diabetic Ulcer  Location: Foot  Wound Location Orientation: Right  Wound Descr...   Dressing Status Old drainage noted   Wound Cleansed Soap and water   Wound Assessment [place]/red;Slough   Drainage Amount Large (50-75% saturated)   Drainage Description Serosanguinous   Odor None   Haily-wound Assessment Blanchable erythema   Margins Undefined edges   Wound Thickness Description not for Pressure Injury Full thickness   Pain Assessment   Pain Assessment 0-10   Pain Level 4   Pain Location Foot   Pain Orientation Right   Pain Descriptors Aching     /76   Pulse 84   Temp 97 °F (36.1 °C) (Temporal)   Resp 18

## 2025-01-10 ENCOUNTER — HOSPITAL ENCOUNTER (OUTPATIENT)
Facility: HOSPITAL | Age: 55
Discharge: HOME | End: 2025-01-10
Attending: SURGERY
Payer: MEDICAID

## 2025-01-10 VITALS
DIASTOLIC BLOOD PRESSURE: 74 MMHG | SYSTOLIC BLOOD PRESSURE: 134 MMHG | RESPIRATION RATE: 16 BRPM | TEMPERATURE: 98 F | HEART RATE: 96 BPM

## 2025-01-10 DIAGNOSIS — L97.414 DIABETIC ULCER OF RIGHT MIDFOOT ASSOCIATED WITH TYPE 2 DIABETES MELLITUS, WITH NECROSIS OF BONE (HCC): Primary | ICD-10-CM

## 2025-01-10 DIAGNOSIS — E11.621 DIABETIC ULCER OF RIGHT MIDFOOT ASSOCIATED WITH TYPE 2 DIABETES MELLITUS, WITH NECROSIS OF BONE (HCC): Primary | ICD-10-CM

## 2025-01-10 PROCEDURE — 97606 NEG PRS WND THER DME>50 SQCM: CPT

## 2025-01-10 PROCEDURE — 11043 DBRDMT MUSC&/FSCA 1ST 20/<: CPT

## 2025-01-10 RX ORDER — BETAMETHASONE DIPROPIONATE 0.5 MG/G
CREAM TOPICAL ONCE
OUTPATIENT
Start: 2025-01-10 | End: 2025-01-10

## 2025-01-10 RX ORDER — LIDOCAINE 50 MG/G
OINTMENT TOPICAL ONCE
OUTPATIENT
Start: 2025-01-10 | End: 2025-01-10

## 2025-01-10 RX ORDER — LIDOCAINE HYDROCHLORIDE 20 MG/ML
JELLY TOPICAL ONCE
OUTPATIENT
Start: 2025-01-10 | End: 2025-01-10

## 2025-01-10 RX ORDER — LIDOCAINE HYDROCHLORIDE 40 MG/ML
SOLUTION TOPICAL ONCE
OUTPATIENT
Start: 2025-01-10 | End: 2025-01-10

## 2025-01-10 RX ORDER — SODIUM CHLOR/HYPOCHLOROUS ACID 0.033 %
SOLUTION, IRRIGATION IRRIGATION ONCE
OUTPATIENT
Start: 2025-01-10 | End: 2025-01-10

## 2025-01-10 RX ORDER — GENTAMICIN SULFATE 1 MG/G
OINTMENT TOPICAL ONCE
OUTPATIENT
Start: 2025-01-10 | End: 2025-01-10

## 2025-01-10 RX ORDER — NEOMYCIN/BACITRACIN/POLYMYXINB 3.5-400-5K
OINTMENT (GRAM) TOPICAL ONCE
OUTPATIENT
Start: 2025-01-10 | End: 2025-01-10

## 2025-01-10 RX ORDER — MUPIROCIN 20 MG/G
OINTMENT TOPICAL ONCE
OUTPATIENT
Start: 2025-01-10 | End: 2025-01-10

## 2025-01-10 RX ORDER — TRIAMCINOLONE ACETONIDE 1 MG/G
OINTMENT TOPICAL ONCE
OUTPATIENT
Start: 2025-01-10 | End: 2025-01-10

## 2025-01-10 RX ORDER — LIDOCAINE 40 MG/G
CREAM TOPICAL ONCE
OUTPATIENT
Start: 2025-01-10 | End: 2025-01-10

## 2025-01-10 RX ORDER — BACITRACIN ZINC AND POLYMYXIN B SULFATE 500; 1000 [USP'U]/G; [USP'U]/G
OINTMENT TOPICAL ONCE
OUTPATIENT
Start: 2025-01-10 | End: 2025-01-10

## 2025-01-10 RX ORDER — GINSENG 100 MG
CAPSULE ORAL ONCE
OUTPATIENT
Start: 2025-01-10 | End: 2025-01-10

## 2025-01-10 RX ORDER — CLOBETASOL PROPIONATE 0.5 MG/G
OINTMENT TOPICAL ONCE
OUTPATIENT
Start: 2025-01-10 | End: 2025-01-10

## 2025-01-10 RX ORDER — SILVER SULFADIAZINE 10 MG/G
CREAM TOPICAL ONCE
OUTPATIENT
Start: 2025-01-10 | End: 2025-01-10

## 2025-01-10 ASSESSMENT — PAIN SCALES - GENERAL: PAINLEVEL_OUTOF10: 7

## 2025-01-10 ASSESSMENT — PAIN DESCRIPTION - DESCRIPTORS: DESCRIPTORS: DISCOMFORT;SHARP

## 2025-01-10 ASSESSMENT — PAIN DESCRIPTION - LOCATION: LOCATION: LEG

## 2025-01-10 NOTE — DISCHARGE INSTRUCTIONS
TO KEEP THE WOUND COVERED AT ALL TIMES.     Physician Signature:_______________________    Date: ___________ Time:  ____________

## 2025-01-10 NOTE — FLOWSHEET NOTE
Negative Pressure    NAME:  Milton Hughes  YOB: 1970  MEDICAL RECORD NUMBER:  [id number]  DATE:  1/10/2025    Applied Negative Pressure to right foot wound(s)/ulcer(s).  [x] Applied skin barrier prep to rancho-wound.   [x] Cut strips of plastic drape to picture frame wound so that rancho-wound is     covered with the drape.   [x] If bridging dressing to less prominent site, cover any intact skin that will come in contact with the Negative Pressure Therapy sponge, gauze or channel drain with plastic drape.  The sponge should never touch intact skin.   [x] Cut sponge, gauze or channel drain to size which will fit into the wound/ulcer bed without being forced.   [x] Be sure the sponge is large enough to hold the entire round plastic flange which is attached to the tubing.  Never allow flange to be larger than the sponge or it will produce suction damaging intact skin.  Total number of individual pieces of foam used within the wound bed: 1  [x] If bridging the dressing away from the primary site, be sure the bridge leads to a piece of sponge large enough to hold the entire flange without allowing any of the flange to overlap onto intact skin.   [x] Covered sponge, gauze or channel drain with plastic drape.   [x] Cut a hole in this plastic drape directly over the sponge the same size as the plastic drain tubing.   [x] Removed plastic liner from flange and apply it directly over the hole you cut.   [x] Removed the plastic cover from the flange.   [x] Attached the tubing to the wound/ulcer Negative Pressure Therapy and turn it on to be sure a vacuum is created and that there are no leaks.   [x] If air leaks occur, use plastic drape to patch them.   [x] Secured Negative Pressure Therapy dressing with ace wrap loosely if located on an extremity. Maintain tubing outside of ace wrap. Tubing must not exert pressure on intact skin.    Response to treatment: Yes     Applied per

## 2025-01-10 NOTE — PROGRESS NOTES
Triage Note Won't eat for a day or two. Has cold sores in mouth.  Has hand rash Wound care    The patient is a 54-year-old man who was referred to the wound care center regarding a wound on the right foot.  The patient has history of diabetes mellitus and had several operative debridements of the right foot including amputation of the right fifth toe, with the last surgery being on 12/4/2024 at [facility].  The patient was discharged in the hospital on 12/12/2024.  He has had a wound VAC in place.    The patient was first seen at the [facility] wound care center on 12/30/2024.    The patient has history of diabetes mellitus which is not fully controlled.  Hemoglobin A1c was 10.3 on 11/30/2024.  There had been some confusion about his ordered diabetic medications.  He describes not previously consistently following a diabetic diet, but he has made progress.  He will be attending educational classes regarding diabetic diet.  He does not describe anginal symptoms or have history of MI or coronary intervention.  He does not describe dyspnea.    Past medical history includes hyperlipidemia, obesity, peripheral artery disease, thrombocytopenia.      Dressing as of 12/30/2024: Cover wound with collagen.  Apply black foam and wound VAC at -125 mmHg.  Change wound VAC 2 times per week.    It was not possible to arrange home health care.  The patient is having VAC changes 2 times per week at the wound care center.          Reported weight 235 pounds height 6 feet    Physical examination    The patient is alert man in no acute distress.    Examination of the right lower extremity revealed 2+ dorsalis pedis pulse.  There was trace edema in the right lower leg and foot.  There was surgical absence of the fifth toe.  There was an open wound on the dorsal aspect of the foot extending from the site of the amputated fifth toe proximally with dimensions 8 x 6.5 x 1.1 cm.  The base of the wound had areas of improved granulation, but there are extensive areas of exposed tendons.  Some portions of the tendons had superficial

## 2025-01-10 NOTE — PROGRESS NOTES
Debridement Wound Care        Problem List Items Addressed This Visit          Hospital    Diabetic ulcer of right midfoot associated with type 2 diabetes mellitus, with necrosis of bone (HCC) - Primary    Relevant Orders    Initiate Outpatient Wound Care Protocol       Procedure Note  Indications:  Based on my examination of this patient's wound(s)/ulcer(s) today, debridement is  required to promote healing and evaluate the wound base.    Performed by: Herb Berry MD    Consent obtained: yes    Time out taken: yes    Operative site marked: Yes    Debridement: Excisional Debridement    Using scissors and forceps the wound was  sharply debrided down through and including the removal of    Subctaneous Tissue , Muscle faacia and portions of tendon.  Sharp excisional debridement was carried out with removal of necrotic tissue, slough, and granulation into the muscle layer.  Debridement was carried down to bleeding viable tissue.    Devitalized Tissue Debrided: slough and necrotic/eschar    Pre Debridement Measurements:  Are located in the Wound/Ulcer Documentation Flow Sheet    Wound/Ulcer #: right foot wound    Post Debridement Measurements:  Wound/Ulcer Descriptions are Pre Debridement except measurements:Other depth increased by 0.1 cm in debrided area.          Percent of Wound(s)/Ulcer(s) Debrided: 20  %  Total Surface Area Debrided:  10 sq cm     Diabetic/Pressure/Non Pressure Ulcers only:  Ulcer:     Estimated Blood Loss:  Minimal    Hemostasis Achieved: Pressure    Procedural Pain: 0 / 10     Post Procedural Pain: 0 / 10     Response to treatment: Well tolerated by patient

## 2025-01-10 NOTE — FLOWSHEET NOTE
01/10/25 1032   RLE Neurovascular Assessment   Capillary Refill Less than/Equal to 3 seconds   Color Appropriate for Ethnicity   Temperature Warm   RLE Sensation  Decreased   R Post Tibial Pulse +3 (Strong)   Negative Pressure Wound Therapy Foot Right;Dorsal;Lateral   Placement Date/Time: 12/09/24 1108   Present on Admission/Arrival: Yes  Location: Foot  Wound Location Orientation: Right;Dorsal;Lateral   Dressing Status Intact w/seal   Canister changed? Yes   Unit Type activac   Mode Continuous   Target Pressure (mmHg) 125   $$ Dressing Changed & Charged $ Standard NPWT >50 sq cm PER TX   Dressing Type Black foam   Dressing Change Due 01/10/25   Wound 12/30/24 Foot Right # 1   Date First Assessed/Time First Assessed: 12/30/24 0935   Present on Original Admission: Yes  Wound Approximate Age at First Assessment (Weeks): 4 weeks  Primary Wound Type: Diabetic Ulcer  Location: Foot  Wound Location Orientation: Right  Wound Descr...   Wound Image     Wound Etiology Surgical   Dressing Status Old drainage noted   Wound Cleansed Soap and water   Wound Length (cm) 8 cm   Wound Width (cm) 6.5 cm   Wound Depth (cm) 1.1 cm   Wound Surface Area (cm^2) 52 cm^2   Change in Wound Size % (l*w) 9.72   Wound Volume (cm^3) 57.2 cm^3   Wound Healing % -66   Wound Assessment Pink/red;Slough;Exposed structure tendon   Drainage Amount Large (50-75% saturated)   Drainage Description Serosanguinous   Odor None   Haily-wound Assessment Blanchable erythema   Margins Undefined edges   Wound Thickness Description not for Pressure Injury Full thickness   Pain Assessment   Pain Assessment 0-10   Pain Level 7   Pain Location Leg   Pain Orientation Right  (on left side of right lower leg, foot)   Pain Descriptors Discomfort;Sharp     /74   Pulse 96   Temp 98 °F (36.7 °C) (Temporal)   Resp 16       Patient advised to see podiatrist to inquire about exposed tendon and next steps that need to be taken. Patient still has medicaid pending.

## 2025-01-13 ENCOUNTER — HOSPITAL ENCOUNTER (OUTPATIENT)
Facility: HOSPITAL | Age: 55
Discharge: HOME | End: 2025-01-13
Payer: MEDICAID

## 2025-01-13 ENCOUNTER — OFFICE VISIT (OUTPATIENT)
Age: 55
End: 2025-01-13
Payer: MEDICAID

## 2025-01-13 VITALS
HEART RATE: 90 BPM | RESPIRATION RATE: 18 BRPM | SYSTOLIC BLOOD PRESSURE: 133 MMHG | TEMPERATURE: 97.8 F | DIASTOLIC BLOOD PRESSURE: 82 MMHG

## 2025-01-13 DIAGNOSIS — E11.59 TYPE 2 DIABETES MELLITUS WITH OTHER CIRCULATORY COMPLICATION, WITH LONG-TERM CURRENT USE OF INSULIN (HCC): Primary | ICD-10-CM

## 2025-01-13 DIAGNOSIS — L97.414 DIABETIC ULCER OF RIGHT MIDFOOT ASSOCIATED WITH TYPE 2 DIABETES MELLITUS, WITH NECROSIS OF BONE (HCC): Primary | ICD-10-CM

## 2025-01-13 DIAGNOSIS — Z79.4 TYPE 2 DIABETES MELLITUS WITH OTHER CIRCULATORY COMPLICATION, WITH LONG-TERM CURRENT USE OF INSULIN (HCC): Primary | ICD-10-CM

## 2025-01-13 DIAGNOSIS — E11.621 DIABETIC ULCER OF RIGHT MIDFOOT ASSOCIATED WITH TYPE 2 DIABETES MELLITUS, WITH NECROSIS OF BONE (HCC): Primary | ICD-10-CM

## 2025-01-13 PROCEDURE — G0108 DIAB MANAGE TRN  PER INDIV: HCPCS

## 2025-01-13 PROCEDURE — 97605 NEG PRS WND THER DME<=50SQCM: CPT

## 2025-01-13 RX ORDER — LIDOCAINE 50 MG/G
OINTMENT TOPICAL ONCE
Status: CANCELLED | OUTPATIENT
Start: 2025-01-13 | End: 2025-01-13

## 2025-01-13 RX ORDER — CLOBETASOL PROPIONATE 0.5 MG/G
OINTMENT TOPICAL ONCE
Status: CANCELLED | OUTPATIENT
Start: 2025-01-13 | End: 2025-01-13

## 2025-01-13 RX ORDER — BACITRACIN ZINC AND POLYMYXIN B SULFATE 500; 1000 [USP'U]/G; [USP'U]/G
OINTMENT TOPICAL ONCE
Status: CANCELLED | OUTPATIENT
Start: 2025-01-13 | End: 2025-01-13

## 2025-01-13 RX ORDER — TRIAMCINOLONE ACETONIDE 1 MG/G
OINTMENT TOPICAL ONCE
Status: CANCELLED | OUTPATIENT
Start: 2025-01-13 | End: 2025-01-13

## 2025-01-13 RX ORDER — LIDOCAINE 40 MG/G
CREAM TOPICAL ONCE
Status: CANCELLED | OUTPATIENT
Start: 2025-01-13 | End: 2025-01-13

## 2025-01-13 RX ORDER — GENTAMICIN SULFATE 1 MG/G
OINTMENT TOPICAL ONCE
Status: CANCELLED | OUTPATIENT
Start: 2025-01-13 | End: 2025-01-13

## 2025-01-13 RX ORDER — LIDOCAINE HYDROCHLORIDE 20 MG/ML
JELLY TOPICAL ONCE
Status: CANCELLED | OUTPATIENT
Start: 2025-01-13 | End: 2025-01-13

## 2025-01-13 RX ORDER — LIDOCAINE HYDROCHLORIDE 40 MG/ML
SOLUTION TOPICAL ONCE
Status: CANCELLED | OUTPATIENT
Start: 2025-01-13 | End: 2025-01-13

## 2025-01-13 RX ORDER — BETAMETHASONE DIPROPIONATE 0.5 MG/G
CREAM TOPICAL ONCE
Status: CANCELLED | OUTPATIENT
Start: 2025-01-13 | End: 2025-01-13

## 2025-01-13 RX ORDER — NEOMYCIN/BACITRACIN/POLYMYXINB 3.5-400-5K
OINTMENT (GRAM) TOPICAL ONCE
Status: CANCELLED | OUTPATIENT
Start: 2025-01-13 | End: 2025-01-13

## 2025-01-13 RX ORDER — SILVER SULFADIAZINE 10 MG/G
CREAM TOPICAL ONCE
Status: CANCELLED | OUTPATIENT
Start: 2025-01-13 | End: 2025-01-13

## 2025-01-13 RX ORDER — MUPIROCIN 20 MG/G
OINTMENT TOPICAL ONCE
Status: CANCELLED | OUTPATIENT
Start: 2025-01-13 | End: 2025-01-13

## 2025-01-13 RX ORDER — GINSENG 100 MG
CAPSULE ORAL ONCE
Status: CANCELLED | OUTPATIENT
Start: 2025-01-13 | End: 2025-01-13

## 2025-01-13 RX ORDER — SODIUM CHLOR/HYPOCHLOROUS ACID 0.033 %
SOLUTION, IRRIGATION IRRIGATION ONCE
Status: CANCELLED | OUTPATIENT
Start: 2025-01-13 | End: 2025-01-13

## 2025-01-13 ASSESSMENT — PAIN SCALES - GENERAL: PAINLEVEL_OUTOF10: 7

## 2025-01-13 NOTE — FLOWSHEET NOTE
01/13/25 0855   Negative Pressure Wound Therapy Foot Right;Dorsal;Lateral   Placement Date/Time: 12/09/24 1108   Present on Admission/Arrival: Yes  Location: Foot  Wound Location Orientation: Right;Dorsal;Lateral   Dressing Status Intact w/seal   Canister changed? Yes   Mode Continuous   Target Pressure (mmHg) 125   $$ Dressing Changed & Charged $ Standard NPWT less than or equal to 50 sq cm PER TX   Number of pieces placed 1   Dressing Type Black foam   Dressing Change Due 01/17/25   Wound 12/30/24 Foot Right # 1   Date First Assessed/Time First Assessed: 12/30/24 0935   Present on Original Admission: Yes  Wound Approximate Age at First Assessment (Weeks): 4 weeks  Primary Wound Type: Diabetic Ulcer  Location: Foot  Wound Location Orientation: Right  Wound Descr...   Dressing Status New dressing applied   Dressing/Treatment Negative pressure wound therapy     Negative Pressure    NAME:  Milton Hughes  YOB: 1970  MEDICAL RECORD NUMBER:  [id number]  DATE:  1/13/2025    Applied Negative Pressure to Right foot wound(s)/ulcer(s).  [x] Applied skin barrier prep to rancho-wound.   [x] Cut strips of plastic drape to picture frame wound so that rancho-wound is     covered with the drape.   [x] If bridging dressing to less prominent site, cover any intact skin that will come in contact with the Negative Pressure Therapy sponge, gauze or channel drain with plastic drape.  The sponge should never touch intact skin.   [x] Cut sponge, gauze or channel drain to size which will fit into the wound/ulcer bed without being forced.   [x] Be sure the sponge is large enough to hold the entire round plastic flange which is attached to the tubing.  Never allow flange to be larger than the sponge or it will produce suction damaging intact skin.  Total number of individual pieces of foam used within the wound bed: 1  [x] If bridging the dressing away from the primary site, be sure the bridge leads to a piece of sponge large

## 2025-01-14 ENCOUNTER — OFFICE VISIT (OUTPATIENT)
Age: 55
End: 2025-01-14
Payer: MEDICAID

## 2025-01-14 VITALS
TEMPERATURE: 97.8 F | BODY MASS INDEX: 32.91 KG/M2 | DIASTOLIC BLOOD PRESSURE: 80 MMHG | SYSTOLIC BLOOD PRESSURE: 123 MMHG | RESPIRATION RATE: 16 BRPM | WEIGHT: 243 LBS | OXYGEN SATURATION: 97 % | HEART RATE: 96 BPM | HEIGHT: 72 IN

## 2025-01-14 DIAGNOSIS — E11.59 TYPE 2 DIABETES MELLITUS WITH OTHER CIRCULATORY COMPLICATION, WITH LONG-TERM CURRENT USE OF INSULIN (HCC): Primary | Chronic | ICD-10-CM

## 2025-01-14 DIAGNOSIS — Z79.4 TYPE 2 DIABETES MELLITUS WITH OTHER CIRCULATORY COMPLICATION, WITH LONG-TERM CURRENT USE OF INSULIN (HCC): Primary | Chronic | ICD-10-CM

## 2025-01-14 DIAGNOSIS — E11.59 TYPE 2 DIABETES MELLITUS WITH OTHER CIRCULATORY COMPLICATION, WITH LONG-TERM CURRENT USE OF INSULIN (HCC): ICD-10-CM

## 2025-01-14 DIAGNOSIS — Z79.4 TYPE 2 DIABETES MELLITUS WITH OTHER CIRCULATORY COMPLICATION, WITH LONG-TERM CURRENT USE OF INSULIN (HCC): ICD-10-CM

## 2025-01-14 PROCEDURE — 3074F SYST BP LT 130 MM HG: CPT | Performed by: NURSE PRACTITIONER

## 2025-01-14 PROCEDURE — 3079F DIAST BP 80-89 MM HG: CPT | Performed by: NURSE PRACTITIONER

## 2025-01-14 PROCEDURE — 99214 OFFICE O/P EST MOD 30 MIN: CPT | Performed by: NURSE PRACTITIONER

## 2025-01-14 RX ORDER — INSULIN GLARGINE 100 [IU]/ML
22 INJECTION, SOLUTION SUBCUTANEOUS NIGHTLY
Qty: 10 ML | Refills: 3 | Status: SHIPPED | OUTPATIENT
Start: 2025-01-14

## 2025-01-14 RX ORDER — INSULIN ASPART 100 [IU]/ML
INJECTION, SOLUTION INTRAVENOUS; SUBCUTANEOUS
Qty: 10 ML | Refills: 3 | Status: SHIPPED | OUTPATIENT
Start: 2025-01-14

## 2025-01-14 RX ORDER — INSULIN GLARGINE 100 [IU]/ML
22 INJECTION, SOLUTION SUBCUTANEOUS NIGHTLY
Qty: 5 ADJUSTABLE DOSE PRE-FILLED PEN SYRINGE | Refills: 0 | Status: SHIPPED | OUTPATIENT
Start: 2025-01-14

## 2025-01-14 RX ORDER — INSULIN ASPART 100 [IU]/ML
INJECTION, SOLUTION INTRAVENOUS; SUBCUTANEOUS
Qty: 5 ADJUSTABLE DOSE PRE-FILLED PEN SYRINGE | Refills: 3 | Status: SHIPPED | OUTPATIENT
Start: 2025-01-14

## 2025-01-14 ASSESSMENT — PATIENT HEALTH QUESTIONNAIRE - PHQ9
2. FEELING DOWN, DEPRESSED OR HOPELESS: NOT AT ALL
SUM OF ALL RESPONSES TO PHQ QUESTIONS 1-9: 0
SUM OF ALL RESPONSES TO PHQ9 QUESTIONS 1 & 2: 0
SUM OF ALL RESPONSES TO PHQ QUESTIONS 1-9: 0
1. LITTLE INTEREST OR PLEASURE IN DOING THINGS: NOT AT ALL

## 2025-01-14 ASSESSMENT — ENCOUNTER SYMPTOMS
COLOR CHANGE: 0
ABDOMINAL PAIN: 0
NAUSEA: 0
SHORTNESS OF BREATH: 0
VOMITING: 0

## 2025-01-14 NOTE — PROGRESS NOTES
Barriers to Learning      None     Favorite Ways to Learn   [] Lecture  [] Slides  [] Reading [] Video-Internet  [] Cassettes/CDs/MP3's  [] Interactive Small Groups [x] Other one on one       Behavioral Assessment  Current self-care practices  Educator assessment (1/14/2025)   Healthy Eating   Current practices  Eats 4 meals daily, feels hungry much of the time    24-hour Dietary Recall:  Breakfast: 3 eggs with 3 sausages, pear, glass of whole milk  Lunch: chicken salad sandwich , milk  Dinner: codon nedra chicken ( pre made in store) mashed potatoes, broccoli, milk  4th meal- pepperoni pizza  Snacks: sugar free pudding, chips on occasion , fruit  Beverages: zero sugar drinks, water, milk  Alcohol: none at present       Would benefit from DSMES related to Healthy Eating: Yes    Eats a carbohydrate controlled diet: No     Stage of change: Action      Being Active  Current practices  How many days during the past week have you performed physical activity where your heart beats faster and your breathing is harder than normal for 30 minutes or more?  0 day(s)    How many days in a typical week do you perform activity such as this?  0 day(s)     Would benefit from DSMES related to Being Active: Yes      Exercises 150 minutes/week: No      Stage of change: Preparation     Monitoring  Current practices  Do you monitor your blood sugar? Yes    How often do you monitor? 2x/day    What are the range of readings? 140 mg/dL -250 mg/dL    Do you know your last A1c measurement? Yes    Do you know the meaning of the A1c? Yes     Would benefit from DSMES related to Monitoring: Yes      Uses BG readings to establish trends and understand BG patterns: Yes, somewhat      Stage of change: Action   Taking Medication  Current practices  Do you understand what your diabetes medications do? No    How often do you miss doses of your diabetes medications? never    Can you afford your diabetes medications? Yes   Would benefit from DSMES

## 2025-01-14 NOTE — PROGRESS NOTES
Chief Complaint   Patient presents with    Follow-up    Diabetes         Health Maintenance Due   Topic Date Due    Pneumococcal 0-64 years Vaccine (1 of 2 - PCV) Never done    HIV screen  Never done    Diabetic Alb to Cr ratio (uACR) test  Never done    Diabetic retinal exam  Never done    Hepatitis C screen  Never done    Hepatitis B vaccine (1 of 3 - 19+ 3-dose series) Never done    Colorectal Cancer Screen  Never done    Shingles vaccine (1 of 2) Never done    DTaP/Tdap/Td vaccine (1 - Tdap) 03/27/2024    Flu vaccine (1) Never done         \"Have you been to the ER, urgent care clinic since your last visit?  Hospitalized since your last visit?\"    ER on 12-17-24    “Have you seen or consulted any other health care providers outside of [facility] since your last visit?”    Wound care    “Have you had a colorectal cancer screening such as a colonoscopy/FIT/Cologuard?    NO    No colonoscopy on file  No cologuard on file  No FIT/FOBT on file   No flexible sigmoidoscopy on file            
units, greater than 301 take 10 units., Disp-10 mL, R-3Normal  -     insulin glargine (LANTUS SOLOSTAR) 100 UNIT/ML injection pen; Inject 22 Units into the skin nightly, Disp-5 Adjustable Dose Pre-filled Pen Syringe, R-0Normal  -     insulin glargine (LANTUS) 100 UNIT/ML injection vial; Inject 22 Units into the skin nightly, Disp-10 mL, R-3Normal  -     Insulin Pen Needle 32G X 4 MM MISC; DAILY Starting Tue 1/14/2025, Disp-100 each, R-3, Normal  -     Albumin/Creatinine Ratio, Urine; Future       Return in about 1 week (around 1/21/2025), or diabetic, for virtual, medication f/u, MD visit.       Subjective     Patient is a type II diabetic which has been uncontrolled.  Last week sent in a prescription for him to start taking the mealtime or quick acting insulin.  However there were some opportunities to the pharmacy and patient did not pick it up.  He is monitoring his blood glucose levels and he is going to a diabetes management class where he endorses he is learning a lot.  However patient's blood glucose levels still remain elevated between 200-300.  Rightfully so he is concerned about healing of his right lower extremity.    Of note patient said he was down to 92 and was not feeling well with his blood glucose levels that low.  Ideally I would like to see the patient less than 150.     Patient has been taking some gabapentin and endorses that does not seem to be helping with the right lower extremity pain.  I suspect that the pain he is feeling in his right lower extremity could be referred pain.  Discussed with the patient his right lower extremity is in the healing process and he additionally lost 1 toe as well as had nerve endings altered and that he could also be experiencing something on his phantom pain as well.    If the gabapentin is not helping the patient, I would encourage Tylenol at 1000 mg 4 times per day.  Patient did have lower extremity duplexes while he was in the hospital with no obvious PAD.

## 2025-01-17 ENCOUNTER — HOSPITAL ENCOUNTER (OUTPATIENT)
Facility: HOSPITAL | Age: 55
Discharge: HOME | End: 2025-01-17
Attending: SURGERY
Payer: MEDICAID

## 2025-01-17 VITALS
DIASTOLIC BLOOD PRESSURE: 81 MMHG | HEART RATE: 92 BPM | SYSTOLIC BLOOD PRESSURE: 152 MMHG | RESPIRATION RATE: 16 BRPM | TEMPERATURE: 97.7 F

## 2025-01-17 DIAGNOSIS — S91.301D OPEN WOUND OF RIGHT FOOT, SUBSEQUENT ENCOUNTER: ICD-10-CM

## 2025-01-17 DIAGNOSIS — L97.414 DIABETIC ULCER OF RIGHT MIDFOOT ASSOCIATED WITH TYPE 2 DIABETES MELLITUS, WITH NECROSIS OF BONE (HCC): Primary | ICD-10-CM

## 2025-01-17 DIAGNOSIS — E11.621 DIABETIC ULCER OF RIGHT MIDFOOT ASSOCIATED WITH TYPE 2 DIABETES MELLITUS, WITH NECROSIS OF BONE (HCC): Primary | ICD-10-CM

## 2025-01-17 PROBLEM — S91.301A OPEN WOUND OF RIGHT FOOT: Status: ACTIVE | Noted: 2025-01-17

## 2025-01-17 PROCEDURE — 97605 NEG PRS WND THER DME<=50SQCM: CPT

## 2025-01-17 PROCEDURE — 87205 SMEAR GRAM STAIN: CPT

## 2025-01-17 PROCEDURE — 87070 CULTURE OTHR SPECIMN AEROBIC: CPT

## 2025-01-17 RX ORDER — BACITRACIN ZINC AND POLYMYXIN B SULFATE 500; 1000 [USP'U]/G; [USP'U]/G
OINTMENT TOPICAL ONCE
OUTPATIENT
Start: 2025-01-17 | End: 2025-01-17

## 2025-01-17 RX ORDER — LIDOCAINE 50 MG/G
OINTMENT TOPICAL ONCE
OUTPATIENT
Start: 2025-01-17 | End: 2025-01-17

## 2025-01-17 RX ORDER — LIDOCAINE 40 MG/G
CREAM TOPICAL ONCE
OUTPATIENT
Start: 2025-01-17 | End: 2025-01-17

## 2025-01-17 RX ORDER — CLOBETASOL PROPIONATE 0.5 MG/G
OINTMENT TOPICAL ONCE
OUTPATIENT
Start: 2025-01-17 | End: 2025-01-17

## 2025-01-17 RX ORDER — SILVER SULFADIAZINE 10 MG/G
CREAM TOPICAL ONCE
OUTPATIENT
Start: 2025-01-17 | End: 2025-01-17

## 2025-01-17 RX ORDER — BETAMETHASONE DIPROPIONATE 0.5 MG/G
CREAM TOPICAL ONCE
OUTPATIENT
Start: 2025-01-17 | End: 2025-01-17

## 2025-01-17 RX ORDER — LIDOCAINE HYDROCHLORIDE 20 MG/ML
JELLY TOPICAL ONCE
OUTPATIENT
Start: 2025-01-17 | End: 2025-01-17

## 2025-01-17 RX ORDER — LIDOCAINE HYDROCHLORIDE 40 MG/ML
SOLUTION TOPICAL ONCE
OUTPATIENT
Start: 2025-01-17 | End: 2025-01-17

## 2025-01-17 RX ORDER — NEOMYCIN/BACITRACIN/POLYMYXINB 3.5-400-5K
OINTMENT (GRAM) TOPICAL ONCE
OUTPATIENT
Start: 2025-01-17 | End: 2025-01-17

## 2025-01-17 RX ORDER — GINSENG 100 MG
CAPSULE ORAL ONCE
OUTPATIENT
Start: 2025-01-17 | End: 2025-01-17

## 2025-01-17 RX ORDER — MUPIROCIN 20 MG/G
OINTMENT TOPICAL ONCE
OUTPATIENT
Start: 2025-01-17 | End: 2025-01-17

## 2025-01-17 RX ORDER — TRIAMCINOLONE ACETONIDE 1 MG/G
OINTMENT TOPICAL ONCE
OUTPATIENT
Start: 2025-01-17 | End: 2025-01-17

## 2025-01-17 RX ORDER — SODIUM CHLOR/HYPOCHLOROUS ACID 0.033 %
SOLUTION, IRRIGATION IRRIGATION ONCE
OUTPATIENT
Start: 2025-01-17 | End: 2025-01-17

## 2025-01-17 RX ORDER — GENTAMICIN SULFATE 1 MG/G
OINTMENT TOPICAL ONCE
OUTPATIENT
Start: 2025-01-17 | End: 2025-01-17

## 2025-01-17 NOTE — DISCHARGE INSTRUCTIONS
Discharge Instructions/Wound Care Orders  [facility]   [address]   [address], [address]  [place] [address]   Telephone: [phone]     FAX [phone]    NAME:  Milton Hughes  YOB: 1970  MEDICAL RECORD NUMBER:  [id number]  DATE:  1/17/2025     CPT code:  NPWT <50 sq cm (54447)    Wound Care Orders:  Right Foot Wound :Cleanse with normal saline & gauze. Apply Adaptic to exposed tendon. Apply Negative Pressure Wound Therapy/Wound Vac, Using Black Foam. Wound Vac to run continuously @ 125 mmHg. Dressing to be changed 2 x week, in clinic. Nurse Visit in 4 days. Follow up with Dr. Daley in 1 week.   Wound Culture sent today.  Referral to [facility] for full thickness skin graft  Home Health Agency: None  Treatment Orders:   Elevate leg(s) above the level of the heart when sitting, if swelling present.  Avoid prolonged standing in one place.  Wear off-loading shoe/boot on the affected foot when walking.  Do not get dressing/cast wet.  Do not use objects to scratch inside cast/wrap.   Follow diet as prescribed:  [] Diet as tolerated: [x] Diabetic Diet: Low carb no sugar [] No Added Salt:  [] Increase Protein: [] Other:Limit the amount of liquid you are drinking and avoid drinking in between meals             Follow-up:  [x] Return Appointment: Nurse visit in 4 days (Tuesday). Follow up with Dr. Daley in 1 Week(s)  [x] Ordered tests: Wound Culture sent today   Electronically signed AVA ZEPEDA RN on 1/17/2025 at 1:47 PM     Wound Care Center Information: Should you experience any significant changes in your wound(s) or have questions about your wound care, please contact the [facility] at MONDAY - FRIDAY 8:00 am - 4:30.  If you need help with your wound outside these hours and cannot wait until we are again available, contact your PCP or go to the hospital emergency room.     PLEASE NOTE: IF YOU ARE UNABLE TO OBTAIN WOUND SUPPLIES, CONTINUE TO

## 2025-01-17 NOTE — WOUND CARE
Injury Full thickness 01/17/25 1320   Number of days: 18         PHYSICAL EXAM    Physical Exam:   General:  Alert, cooperative, no distress, appears stated age.   Eyes:  PERRL   Back:   Symmetric, no curvature. ROM normal.   Lungs:   Clear to auscultation bilaterally.   Heart:  Regular rate and rhythm, S1, S2 normal, no murmur, click, rub or gallop.   Abdomen:   Soft, non-tender. Bowel sounds normal. No masses,  No organomegaly.   Extremities: Extremities normal, atraumatic, no cyanosis or edema.       Skin: Skin color, texture, turgor normal. No rashes or lesions   Lymph nodes: Cervical, supraclavicular, and axillary nodes normal.   Neurologic: CNII-XII intact. Normal strength,  and reflexes throughout.     LOWER EXTREMITY  Palpable pulses  Epicritic sensation decreased  Good muscle strength  5th right toe and met has been amputated .  There is a remaining large open wound that is not clinically infected. Wound is granular wit exposed tendon       Assessment:      Problem List Items Addressed This Visit          Endocrine    Diabetic ulcer of right midfoot associated with type 2 diabetes mellitus, with necrosis of bone (HCC) - Primary    Relevant Orders    Initiate Outpatient Wound Care Protocol    Culture, Wound (with Gram Stain)   Open non healed surgical wound     Procedure Note  Indications:  Based on my examination of this patient's wound(s)/ulcer(s) today, debridement is not required to promote healing and evaluate the wound base.          Plan:   Discuss dx and treatment plan.  Advised to inspect feet weekly   Cleanse wound with saline  Apply xeroform, and wound vac at 150mmhg.    Refer to [facility] plastic surgery dept; I feel this patient will greatly benefit from a full thickness graft otherwise he may be headed towards a choparts amputation.  To retrun 2x wek for eval and monitoring of wound vac   Face to face 30 minutes   Treatment Note please see attached Discharge Instructions    Written patient dismissal

## 2025-01-17 NOTE — FLOWSHEET NOTE
01/17/25 1320   RLE Neurovascular Assessment   Capillary Refill Less than/Equal to 3 seconds   Color Appropriate for Ethnicity   Temperature Warm   RLE Sensation  Decreased   R Post Tibial Pulse +3 (Strong)   Wound 12/30/24 Foot Right # 1   Date First Assessed/Time First Assessed: 12/30/24 0935   Present on Original Admission: Yes  Wound Approximate Age at First Assessment (Weeks): 4 weeks  Primary Wound Type: Diabetic Ulcer  Location: Foot  Wound Location Orientation: Right  Wound Descr...   Wound Image     Wound Etiology Diabetic   Dressing Status Old drainage noted   Wound Cleansed Soap and water;Cleansed with saline   Dressing/Treatment Negative pressure wound therapy  (Removed NPWT/Wound Vac)   Wound Length (cm) 8 cm   Wound Width (cm) 6 cm   Wound Depth (cm) 1.2 cm   Wound Surface Area (cm^2) 48 cm^2   Change in Wound Size % (l*w) 16.67   Wound Volume (cm^3) 57.6 cm^3   Wound Healing % -67   Wound Assessment Exposed structure tendon;Slough;Pink/red   Drainage Amount Large (50-75% saturated)   Drainage Description Serosanguinous   Haily-wound Assessment Denuded;Blanchable erythema;Fragile   Wound Thickness Description not for Pressure Injury Full thickness     BP (!) 152/81   Pulse 92   Temp 97.7 °F (36.5 °C) (Temporal)   Resp 16

## 2025-01-19 LAB
BACTERIA SPEC CULT: ABNORMAL
GRAM STN SPEC: ABNORMAL
SERVICE CMNT-IMP: ABNORMAL

## 2025-01-20 LAB
BACTERIA SPEC CULT: ABNORMAL
GRAM STN SPEC: ABNORMAL
SERVICE CMNT-IMP: ABNORMAL

## 2025-01-21 ENCOUNTER — HOSPITAL ENCOUNTER (OUTPATIENT)
Facility: HOSPITAL | Age: 55
Discharge: HOME | End: 2025-01-21

## 2025-01-21 ENCOUNTER — TELEMEDICINE (OUTPATIENT)
Age: 55
End: 2025-01-21

## 2025-01-21 VITALS
SYSTOLIC BLOOD PRESSURE: 125 MMHG | DIASTOLIC BLOOD PRESSURE: 79 MMHG | HEART RATE: 83 BPM | TEMPERATURE: 98 F | RESPIRATION RATE: 18 BRPM

## 2025-01-21 DIAGNOSIS — G54.6 PHANTOM PAIN AFTER AMPUTATION OF LOWER EXTREMITY (HCC): Primary | ICD-10-CM

## 2025-01-21 DIAGNOSIS — E11.621 DIABETIC ULCER OF RIGHT MIDFOOT ASSOCIATED WITH TYPE 2 DIABETES MELLITUS, WITH NECROSIS OF BONE (HCC): Primary | ICD-10-CM

## 2025-01-21 DIAGNOSIS — Z79.4 TYPE 2 DIABETES MELLITUS WITH OTHER CIRCULATORY COMPLICATION, WITH LONG-TERM CURRENT USE OF INSULIN (HCC): Chronic | ICD-10-CM

## 2025-01-21 DIAGNOSIS — E11.59 TYPE 2 DIABETES MELLITUS WITH OTHER CIRCULATORY COMPLICATION, WITH LONG-TERM CURRENT USE OF INSULIN (HCC): Chronic | ICD-10-CM

## 2025-01-21 DIAGNOSIS — L97.414 DIABETIC ULCER OF RIGHT MIDFOOT ASSOCIATED WITH TYPE 2 DIABETES MELLITUS, WITH NECROSIS OF BONE (HCC): Primary | ICD-10-CM

## 2025-01-21 PROCEDURE — 99214 OFFICE O/P EST MOD 30 MIN: CPT | Performed by: NURSE PRACTITIONER

## 2025-01-21 PROCEDURE — 97605 NEG PRS WND THER DME<=50SQCM: CPT

## 2025-01-21 RX ORDER — LIDOCAINE HYDROCHLORIDE 20 MG/ML
JELLY TOPICAL ONCE
Status: CANCELLED | OUTPATIENT
Start: 2025-01-21 | End: 2025-01-21

## 2025-01-21 RX ORDER — MUPIROCIN 20 MG/G
OINTMENT TOPICAL ONCE
Status: CANCELLED | OUTPATIENT
Start: 2025-01-21 | End: 2025-01-21

## 2025-01-21 RX ORDER — LIDOCAINE 40 MG/G
CREAM TOPICAL ONCE
Status: CANCELLED | OUTPATIENT
Start: 2025-01-21 | End: 2025-01-21

## 2025-01-21 RX ORDER — TRIAMCINOLONE ACETONIDE 1 MG/G
OINTMENT TOPICAL ONCE
Status: CANCELLED | OUTPATIENT
Start: 2025-01-21 | End: 2025-01-21

## 2025-01-21 RX ORDER — GENTAMICIN SULFATE 1 MG/G
OINTMENT TOPICAL ONCE
Status: CANCELLED | OUTPATIENT
Start: 2025-01-21 | End: 2025-01-21

## 2025-01-21 RX ORDER — BETAMETHASONE DIPROPIONATE 0.5 MG/G
CREAM TOPICAL ONCE
Status: CANCELLED | OUTPATIENT
Start: 2025-01-21 | End: 2025-01-21

## 2025-01-21 RX ORDER — LIDOCAINE HYDROCHLORIDE 40 MG/ML
SOLUTION TOPICAL ONCE
Status: CANCELLED | OUTPATIENT
Start: 2025-01-21 | End: 2025-01-21

## 2025-01-21 RX ORDER — SILVER SULFADIAZINE 10 MG/G
CREAM TOPICAL ONCE
Status: CANCELLED | OUTPATIENT
Start: 2025-01-21 | End: 2025-01-21

## 2025-01-21 RX ORDER — LIDOCAINE 50 MG/G
OINTMENT TOPICAL ONCE
Status: CANCELLED | OUTPATIENT
Start: 2025-01-21 | End: 2025-01-21

## 2025-01-21 RX ORDER — BACITRACIN ZINC AND POLYMYXIN B SULFATE 500; 1000 [USP'U]/G; [USP'U]/G
OINTMENT TOPICAL ONCE
Status: CANCELLED | OUTPATIENT
Start: 2025-01-21 | End: 2025-01-21

## 2025-01-21 RX ORDER — CLOBETASOL PROPIONATE 0.5 MG/G
OINTMENT TOPICAL ONCE
Status: CANCELLED | OUTPATIENT
Start: 2025-01-21 | End: 2025-01-21

## 2025-01-21 RX ORDER — NEOMYCIN/BACITRACIN/POLYMYXINB 3.5-400-5K
OINTMENT (GRAM) TOPICAL ONCE
Status: CANCELLED | OUTPATIENT
Start: 2025-01-21 | End: 2025-01-21

## 2025-01-21 RX ORDER — SODIUM CHLOR/HYPOCHLOROUS ACID 0.033 %
SOLUTION, IRRIGATION IRRIGATION ONCE
Status: CANCELLED | OUTPATIENT
Start: 2025-01-21 | End: 2025-01-21

## 2025-01-21 RX ORDER — INSULIN GLARGINE 100 [IU]/ML
32 INJECTION, SOLUTION SUBCUTANEOUS NIGHTLY
Qty: 10 ML | Refills: 3 | Status: SHIPPED | OUTPATIENT
Start: 2025-01-21

## 2025-01-21 RX ORDER — GINSENG 100 MG
CAPSULE ORAL ONCE
Status: CANCELLED | OUTPATIENT
Start: 2025-01-21 | End: 2025-01-21

## 2025-01-21 RX ORDER — PREGABALIN 100 MG/1
100 CAPSULE ORAL 3 TIMES DAILY
Qty: 90 CAPSULE | Refills: 0 | Status: SHIPPED | OUTPATIENT
Start: 2025-01-21 | End: 2025-02-20

## 2025-01-21 ASSESSMENT — PAIN DESCRIPTION - LOCATION: LOCATION: FOOT;LEG;ANKLE

## 2025-01-21 ASSESSMENT — PAIN DESCRIPTION - ORIENTATION: ORIENTATION: RIGHT

## 2025-01-21 ASSESSMENT — PAIN SCALES - GENERAL: PAINLEVEL_OUTOF10: 8

## 2025-01-21 NOTE — ASSESSMENT & PLAN NOTE
Uncontrolled not at goal  Increase lantus to 32 units  Continue with sliding scale    Orders:    insulin glargine (LANTUS) 100 UNIT/ML injection vial; Inject 32 Units into the skin nightly

## 2025-01-21 NOTE — ASSESSMENT & PLAN NOTE
uncontrolled  Stop gabapentin and start lyrica   Orders:    pregabalin (LYRICA) 100 MG capsule; Take 1 capsule by mouth 3 times daily for 30 days. Max Daily Amount: 300 mg     Lisa

## 2025-01-21 NOTE — PROGRESS NOTES
Milton Hughes, was evaluated through a synchronous (real-time) audio-video encounter. The patient (or guardian if applicable) is aware that this is a billable service, which includes applicable co-pays. This Virtual Visit was conducted with patient's (and/or legal guardian's) consent. Patient identification was verified, and a caregiver was present when appropriate.   The patient was located at Home: [address] [place] [id number]  Provider was located at Facility (Appt Dept): [facility], [address] [address]  Confirm you are appropriately licensed, registered, or certified to deliver care in the state where the patient is located as indicated above. If you are not or unsure, please re-schedule the visit: Yes, I confirm.     Milton Hughes (:  1970) is a Established patient, presenting virtually for evaluation of the following:      Below is the assessment and plan developed based on review of pertinent history, physical exam, labs, studies, and medications.     Assessment & Plan  Phantom pain after amputation of lower extremity (HCC)   uncontrolled  Stop gabapentin and start lyrica   Orders:    pregabalin (LYRICA) 100 MG capsule; Take 1 capsule by mouth 3 times daily for 30 days. Max Daily Amount: 300 mg    Type 2 diabetes mellitus with other circulatory complication, with long-term current use of insulin (HCC)   Uncontrolled not at goal  Increase lantus to 32 units  Continue with sliding scale    Orders:    insulin glargine (LANTUS) 100 UNIT/ML injection vial; Inject 32 Units into the skin nightly      Return if symptoms worsen or fail to improve.       Subjective   Patient presents to the clinic today for a follow-up regarding his diabetes management.  He is currently taking 22 units of long-acting and he is taking a sliding scale he actually increase his sliding scale by 4 units per each increment.    Discussed with patient that we will increase the long-acting insulin

## 2025-01-21 NOTE — FLOWSHEET NOTE
Negative Pressure    NAME:  Milton Hughes  YOB: 1970  MEDICAL RECORD NUMBER:  [id number]  DATE:  1/21/2025  Patient in for nurse visit.   /79   Pulse 83   Temp 98 °F (36.7 °C) (Temporal)   Resp 18      01/21/25 0842   Negative Pressure Wound Therapy Foot Right;Dorsal;Lateral   Placement Date/Time: 12/09/24 1108   Present on Admission/Arrival: Yes  Location: Foot  Wound Location Orientation: Right;Dorsal;Lateral   Dressing Status Intact w/seal   Wound 12/30/24 Foot Right # 1   Date First Assessed/Time First Assessed: 12/30/24 0935   Present on Original Admission: Yes  Wound Approximate Age at First Assessment (Weeks): 4 weeks  Primary Wound Type: Diabetic Ulcer  Location: Foot  Wound Location Orientation: Right  Wound Descr...   Wound Etiology Diabetic   Dressing Status Old drainage noted   Wound Cleansed Soap and water   Wound Assessment Exposed structure tendon;Slough;Pink/red   Drainage Amount Large (50-75% saturated)   Drainage Description Serosanguinous   Odor None   Haily-wound Assessment Denuded;Blanchable erythema;Fragile   Margins Undefined edges   Wound Thickness Description not for Pressure Injury Full thickness   Pain Assessment   Pain Assessment 0-10   Pain Level 8   Pain Location Foot;Leg;Ankle   Pain Orientation Right   Pain Descriptors Hypersensitivity;Aching;Shooting;Throbbing      01/21/25 0844   Negative Pressure Wound Therapy Foot Right;Dorsal;Lateral   Placement Date/Time: 12/09/24 1108   Present on Admission/Arrival: Yes  Location: Foot  Wound Location Orientation: Right;Dorsal;Lateral   Dressing Status Intact w/seal   Canister changed? Yes   Mode Continuous   Target Pressure (mmHg) 125   $$ Dressing Changed & Charged $ Standard NPWT less than or equal to 50 sq cm PER TX   Dressing Type Black foam;Other (Comment)  (adaptic on the tendon)   Wound 12/30/24 Foot Right # 1   Date First Assessed/Time First Assessed: 12/30/24 0935   Present on Original Admission: Yes  Wound

## 2025-01-21 NOTE — PROGRESS NOTES
Chief Complaint   Patient presents with    Follow-up         Health Maintenance Due   Topic Date Due    Pneumococcal 0-64 years Vaccine (1 of 2 - PCV) Never done    HIV screen  Never done    Diabetic Alb to Cr ratio (uACR) test  Never done    Diabetic retinal exam  Never done    Hepatitis C screen  Never done    Hepatitis B vaccine (1 of 3 - 19+ 3-dose series) Never done    Colorectal Cancer Screen  Never done    Shingles vaccine (1 of 2) Never done    DTaP/Tdap/Td vaccine (1 - Tdap) 03/27/2024    Flu vaccine (1) Never done         \"Have you been to the ER, urgent care clinic since your last visit?  Hospitalized since your last visit?\"    no    “Have you seen or consulted any other health care providers outside of [facility] since your last visit?”    Wound care    “Have you had a colorectal cancer screening such as a colonoscopy/FIT/Cologuard?    NO    No colonoscopy on file  No cologuard on file  No FIT/FOBT on file   No flexible sigmoidoscopy on file

## 2025-01-24 ENCOUNTER — HOSPITAL ENCOUNTER (OUTPATIENT)
Facility: HOSPITAL | Age: 55
Discharge: HOME | End: 2025-01-24
Attending: SURGERY
Payer: MEDICAID

## 2025-01-24 VITALS
DIASTOLIC BLOOD PRESSURE: 77 MMHG | SYSTOLIC BLOOD PRESSURE: 135 MMHG | RESPIRATION RATE: 16 BRPM | TEMPERATURE: 97.8 F | HEART RATE: 84 BPM

## 2025-01-24 DIAGNOSIS — L97.414 DIABETIC ULCER OF RIGHT MIDFOOT ASSOCIATED WITH TYPE 2 DIABETES MELLITUS, WITH NECROSIS OF BONE (HCC): Primary | ICD-10-CM

## 2025-01-24 DIAGNOSIS — E11.621 DIABETIC ULCER OF RIGHT MIDFOOT ASSOCIATED WITH TYPE 2 DIABETES MELLITUS, WITH NECROSIS OF BONE (HCC): Primary | ICD-10-CM

## 2025-01-24 PROCEDURE — 97605 NEG PRS WND THER DME<=50SQCM: CPT

## 2025-01-24 RX ORDER — TRIAMCINOLONE ACETONIDE 1 MG/G
OINTMENT TOPICAL ONCE
OUTPATIENT
Start: 2025-01-24 | End: 2025-01-24

## 2025-01-24 RX ORDER — GENTAMICIN SULFATE 1 MG/G
OINTMENT TOPICAL ONCE
OUTPATIENT
Start: 2025-01-24 | End: 2025-01-24

## 2025-01-24 RX ORDER — LIDOCAINE HYDROCHLORIDE 40 MG/ML
SOLUTION TOPICAL ONCE
OUTPATIENT
Start: 2025-01-24 | End: 2025-01-24

## 2025-01-24 RX ORDER — LIDOCAINE 40 MG/G
CREAM TOPICAL ONCE
OUTPATIENT
Start: 2025-01-24 | End: 2025-01-24

## 2025-01-24 RX ORDER — SILVER SULFADIAZINE 10 MG/G
CREAM TOPICAL ONCE
OUTPATIENT
Start: 2025-01-24 | End: 2025-01-24

## 2025-01-24 RX ORDER — LIDOCAINE HYDROCHLORIDE 20 MG/ML
JELLY TOPICAL ONCE
OUTPATIENT
Start: 2025-01-24 | End: 2025-01-24

## 2025-01-24 RX ORDER — GINSENG 100 MG
CAPSULE ORAL ONCE
OUTPATIENT
Start: 2025-01-24 | End: 2025-01-24

## 2025-01-24 RX ORDER — CLOBETASOL PROPIONATE 0.5 MG/G
OINTMENT TOPICAL ONCE
OUTPATIENT
Start: 2025-01-24 | End: 2025-01-24

## 2025-01-24 RX ORDER — MUPIROCIN 20 MG/G
OINTMENT TOPICAL ONCE
OUTPATIENT
Start: 2025-01-24 | End: 2025-01-24

## 2025-01-24 RX ORDER — BACITRACIN ZINC AND POLYMYXIN B SULFATE 500; 1000 [USP'U]/G; [USP'U]/G
OINTMENT TOPICAL ONCE
OUTPATIENT
Start: 2025-01-24 | End: 2025-01-24

## 2025-01-24 RX ORDER — BETAMETHASONE DIPROPIONATE 0.5 MG/G
CREAM TOPICAL ONCE
OUTPATIENT
Start: 2025-01-24 | End: 2025-01-24

## 2025-01-24 RX ORDER — SODIUM CHLOR/HYPOCHLOROUS ACID 0.033 %
SOLUTION, IRRIGATION IRRIGATION ONCE
OUTPATIENT
Start: 2025-01-24 | End: 2025-01-24

## 2025-01-24 RX ORDER — NEOMYCIN/BACITRACIN/POLYMYXINB 3.5-400-5K
OINTMENT (GRAM) TOPICAL ONCE
OUTPATIENT
Start: 2025-01-24 | End: 2025-01-24

## 2025-01-24 RX ORDER — LIDOCAINE 50 MG/G
OINTMENT TOPICAL ONCE
OUTPATIENT
Start: 2025-01-24 | End: 2025-01-24

## 2025-01-24 ASSESSMENT — PAIN DESCRIPTION - ORIENTATION: ORIENTATION: RIGHT;MID

## 2025-01-24 ASSESSMENT — PAIN DESCRIPTION - FREQUENCY: FREQUENCY: CONTINUOUS

## 2025-01-24 ASSESSMENT — PAIN DESCRIPTION - LOCATION: LOCATION: LEG;ANKLE

## 2025-01-24 ASSESSMENT — PAIN SCALES - GENERAL: PAINLEVEL_OUTOF10: 4

## 2025-01-24 ASSESSMENT — PAIN DESCRIPTION - DESCRIPTORS: DESCRIPTORS: HYPERSENSITIVITY;ACHING

## 2025-01-24 NOTE — FLOWSHEET NOTE
Negative Pressure    NAME:  Milton Hughes  YOB: 1970  MEDICAL RECORD NUMBER:  [id number]  DATE:  1/24/2025    Applied Negative Pressure to Right Foot wound(s)/ulcer(s).  [x] Applied skin barrier prep to rancho-wound.   [x] Cut strips of plastic drape to picture frame wound so that rancho-wound is     covered with the drape.   [x] If bridging dressing to less prominent site, cover any intact skin that will come in contact with the Negative Pressure Therapy sponge, gauze or channel drain with plastic drape.  The sponge should never touch intact skin.   [x] Cut sponge, gauze or channel drain to size which will fit into the wound/ulcer bed without being forced.   [x] Be sure the sponge is large enough to hold the entire round plastic flange which is attached to the tubing.  Never allow flange to be larger than the sponge or it will produce suction damaging intact skin.  Total number of individual pieces of foam used within the wound bed: 1  [x] If bridging the dressing away from the primary site, be sure the bridge leads to a piece of sponge large enough to hold the entire flange without allowing any of the flange to overlap onto intact skin.   [x] Covered sponge, gauze or channel drain with plastic drape.   [x] Cut a hole in this plastic drape directly over the sponge the same size as the plastic drain tubing.   [x] Removed plastic liner from flange and apply it directly over the hole you cut.   [x] Removed the plastic cover from the flange.   [x] Attached the tubing to the wound/ulcer Negative Pressure Therapy and turn it on to be sure a vacuum is created and that there are no leaks.   [x] If air leaks occur, use plastic drape to patch them.   [x] Secured Negative Pressure Therapy dressing with ace wrap loosely if located on an extremity. Maintain tubing outside of ace wrap. Tubing must not exert pressure on intact skin.    Response to treatment: Tolerated treatment without difficulty     Applied per

## 2025-01-24 NOTE — DISCHARGE INSTRUCTIONS
Discharge Instructions/Wound Care Orders  [facility]   [address]   [address], [address]  [place] [address]   Telephone: [phone]     FAX [phone]    NAME:  Milton Hughes  YOB: 1970  MEDICAL RECORD NUMBER:  [id number]  DATE:  1/24/2025     CPT code:  NPWT <50 sq cm (74451)    Wound Care Orders:  Right Foot :Cleanse with normal saline & gauze. Apply Hydrogel & Adaptic to exposed tendon, followed by NPWT/Wound Vac, using Black Foam. NPWT to run on continuous setting @ 125 mmHg. Pardeep Seal may be used to assist in maintaining seal. Dressing to be changed 2 x week, in clinic & as needed for compromise. Nurse visit in 4 days &  Follow up with Dr. Daley in 1 week.  Referall To [facility] (For Full Thickness Skin Graft) sent 1 week ago.  Home Health Agency: None  Treatment Orders:   Call [facility] to check on appointment. We will be reaching out to them again as well. [phone]  Elevate leg(s) above the level of the heart when sitting, if swelling present.  Avoid prolonged standing in one place.  Wear off-loading shoe/boot on the affected foot when walking.  Do not get dressing/cast wet.  Do not use objects to scratch inside cast/wrap.   Follow diet as prescribed:  [] Diet as tolerated: [x] Diabetic Diet: Low carb no sugar [] No Added Salt:  [] Increase Protein: [] Other:Limit the amount of liquid you are drinking and avoid drinking in between meals             Follow-up:  [x] Return Appointment: With Dr. Daley in 1 Week(s)  [] Nurse Visit in 4 days (Tuesday)   Electronically signed AVA ZEPEDA RN on 1/24/2025 at 4:01 PM     Wound Care Center Information: Should you experience any significant changes in your wound(s) or have questions about your wound care, please contact the [facility] at MONDAY - FRIDAY 8:00 am - 4:30.  If you need help with your wound outside these hours and cannot wait until we are again available,

## 2025-01-28 ENCOUNTER — OFFICE VISIT (OUTPATIENT)
Age: 55
End: 2025-01-28
Payer: MEDICAID

## 2025-01-28 ENCOUNTER — HOSPITAL ENCOUNTER (OUTPATIENT)
Facility: HOSPITAL | Age: 55
Discharge: HOME | End: 2025-01-28
Payer: MEDICAID

## 2025-01-28 VITALS
TEMPERATURE: 98.1 F | RESPIRATION RATE: 18 BRPM | HEART RATE: 81 BPM | SYSTOLIC BLOOD PRESSURE: 121 MMHG | DIASTOLIC BLOOD PRESSURE: 81 MMHG

## 2025-01-28 DIAGNOSIS — E11.621 DIABETIC ULCER OF RIGHT MIDFOOT ASSOCIATED WITH TYPE 2 DIABETES MELLITUS, WITH NECROSIS OF BONE (HCC): Primary | ICD-10-CM

## 2025-01-28 DIAGNOSIS — Z79.4 TYPE 2 DIABETES MELLITUS WITH OTHER CIRCULATORY COMPLICATION, WITH LONG-TERM CURRENT USE OF INSULIN (HCC): Primary | ICD-10-CM

## 2025-01-28 DIAGNOSIS — E11.59 TYPE 2 DIABETES MELLITUS WITH OTHER CIRCULATORY COMPLICATION, WITH LONG-TERM CURRENT USE OF INSULIN (HCC): Primary | ICD-10-CM

## 2025-01-28 DIAGNOSIS — L97.414 DIABETIC ULCER OF RIGHT MIDFOOT ASSOCIATED WITH TYPE 2 DIABETES MELLITUS, WITH NECROSIS OF BONE (HCC): Primary | ICD-10-CM

## 2025-01-28 PROCEDURE — G0108 DIAB MANAGE TRN  PER INDIV: HCPCS

## 2025-01-28 PROCEDURE — 97605 NEG PRS WND THER DME<=50SQCM: CPT

## 2025-01-28 RX ORDER — LIDOCAINE HYDROCHLORIDE 20 MG/ML
JELLY TOPICAL ONCE
Status: CANCELLED | OUTPATIENT
Start: 2025-01-28 | End: 2025-01-28

## 2025-01-28 RX ORDER — MUPIROCIN 20 MG/G
OINTMENT TOPICAL ONCE
Status: CANCELLED | OUTPATIENT
Start: 2025-01-28 | End: 2025-01-28

## 2025-01-28 RX ORDER — LIDOCAINE 50 MG/G
OINTMENT TOPICAL ONCE
Status: CANCELLED | OUTPATIENT
Start: 2025-01-28 | End: 2025-01-28

## 2025-01-28 RX ORDER — TRIAMCINOLONE ACETONIDE 1 MG/G
OINTMENT TOPICAL ONCE
Status: CANCELLED | OUTPATIENT
Start: 2025-01-28 | End: 2025-01-28

## 2025-01-28 RX ORDER — SODIUM CHLOR/HYPOCHLOROUS ACID 0.033 %
SOLUTION, IRRIGATION IRRIGATION ONCE
Status: CANCELLED | OUTPATIENT
Start: 2025-01-28 | End: 2025-01-28

## 2025-01-28 RX ORDER — SILVER SULFADIAZINE 10 MG/G
CREAM TOPICAL ONCE
Status: CANCELLED | OUTPATIENT
Start: 2025-01-28 | End: 2025-01-28

## 2025-01-28 RX ORDER — LIDOCAINE 40 MG/G
CREAM TOPICAL ONCE
Status: CANCELLED | OUTPATIENT
Start: 2025-01-28 | End: 2025-01-28

## 2025-01-28 RX ORDER — NEOMYCIN/BACITRACIN/POLYMYXINB 3.5-400-5K
OINTMENT (GRAM) TOPICAL ONCE
Status: CANCELLED | OUTPATIENT
Start: 2025-01-28 | End: 2025-01-28

## 2025-01-28 RX ORDER — LIDOCAINE HYDROCHLORIDE 40 MG/ML
SOLUTION TOPICAL ONCE
Status: CANCELLED | OUTPATIENT
Start: 2025-01-28 | End: 2025-01-28

## 2025-01-28 RX ORDER — BETAMETHASONE DIPROPIONATE 0.5 MG/G
CREAM TOPICAL ONCE
Status: CANCELLED | OUTPATIENT
Start: 2025-01-28 | End: 2025-01-28

## 2025-01-28 RX ORDER — BACITRACIN ZINC AND POLYMYXIN B SULFATE 500; 1000 [USP'U]/G; [USP'U]/G
OINTMENT TOPICAL ONCE
Status: CANCELLED | OUTPATIENT
Start: 2025-01-28 | End: 2025-01-28

## 2025-01-28 RX ORDER — GINSENG 100 MG
CAPSULE ORAL ONCE
Status: CANCELLED | OUTPATIENT
Start: 2025-01-28 | End: 2025-01-28

## 2025-01-28 RX ORDER — GENTAMICIN SULFATE 1 MG/G
OINTMENT TOPICAL ONCE
Status: CANCELLED | OUTPATIENT
Start: 2025-01-28 | End: 2025-01-28

## 2025-01-28 RX ORDER — CLOBETASOL PROPIONATE 0.5 MG/G
OINTMENT TOPICAL ONCE
Status: CANCELLED | OUTPATIENT
Start: 2025-01-28 | End: 2025-01-28

## 2025-01-28 ASSESSMENT — PAIN DESCRIPTION - DESCRIPTORS: DESCRIPTORS: ACHING

## 2025-01-28 ASSESSMENT — PAIN SCALES - GENERAL: PAINLEVEL_OUTOF10: 6

## 2025-01-28 ASSESSMENT — PAIN DESCRIPTION - ORIENTATION: ORIENTATION: RIGHT

## 2025-01-28 NOTE — FLOWSHEET NOTE
Negative Pressure    NAME:  Milton Hughes  YOB: 1970  MEDICAL RECORD NUMBER:  [id number]  DATE:  1/28/2025  Patient in for nurse visit.   /81   Pulse 81   Temp 98.1 °F (36.7 °C) (Temporal)   Resp 18      01/28/25 1104   Wound 12/30/24 Foot Right # 1   Date First Assessed/Time First Assessed: 12/30/24 0935   Present on Original Admission: Yes  Wound Approximate Age at First Assessment (Weeks): 4 weeks  Primary Wound Type: Diabetic Ulcer  Location: Foot  Wound Location Orientation: Right  Wound Descr...   Wound Etiology Diabetic   Dressing Status Old drainage noted   Wound Cleansed Soap and water   Dressing/Treatment Negative pressure wound therapy   Offloading for Diabetic Foot Ulcers Post op shoe   Wound Assessment Pink/red;Exposed structure tendon   Drainage Amount Moderate (25-50%)   Drainage Description Serosanguinous;Other (Comment)   Odor None   Haily-wound Assessment Maceration;Fragile   Margins Undefined edges   Wound Thickness Description not for Pressure Injury Full thickness   Pain Assessment   Pain Assessment 0-10   Pain Level 6   Pain Orientation Right   Pain Descriptors Aching        01/28/25 1110   Negative Pressure Wound Therapy Foot Right;Dorsal;Lateral   Placement Date/Time: 12/09/24 1108   Present on Admission/Arrival: Yes  Location: Foot  Wound Location Orientation: Right;Dorsal;Lateral   Dressing Status Intact w/seal   Canister changed? Yes   Mode Continuous   Target Pressure (mmHg) 125   $$ Dressing Changed & Charged $ Standard NPWT less than or equal to 50 sq cm PER TX   Dressing Type Non-adherent contact layer;Black foam  (hydrogel on tendon then adaptic)   Wound 12/30/24 Foot Right # 1   Date First Assessed/Time First Assessed: 12/30/24 0935   Present on Original Admission: Yes  Wound Approximate Age at First Assessment (Weeks): 4 weeks  Primary Wound Type: Diabetic Ulcer  Location: Foot  Wound Location Orientation: Right  Wound Descr...   Dressing/Treatment Negative

## 2025-01-29 ENCOUNTER — FOLLOWUP TELEPHONE ENCOUNTER (OUTPATIENT)
Facility: HOSPITAL | Age: 55
End: 2025-01-29

## 2025-01-29 PROBLEM — Z09 HOSPITAL DISCHARGE FOLLOW-UP: Status: RESOLVED | Noted: 2024-12-30 | Resolved: 2025-01-29

## 2025-01-29 NOTE — PROGRESS NOTES
[facility]  Diabetes Self-Management Education & Support Program  Encounter Note      SUMMARY  Diabetes self-care management training was completed related to what is diabetes ? And healthy eating. The participant will return on February 04 to continue DSMES related to reducing risks and healthy eating. The participant did not identify SMART Goal(s) and will practice knowledge and skills related to healthy eating to improve diabetes self-management.        EVALUATION:  Mr Hughes expressed understanding of T 2 DM disease process & the ADA targets for BG & A1c. He is logging BG before meals & brought to today's session. They are ranging 72 mg/dL- 321 mg/dL. Per log, BG control is improving & since 1/22/25 ranging 72 mg/dL - 182 mg/dL. He is taking 0 - 8 units Novolog before meals. We reviewed the onset, peak & duration of his prescribed insulins, Lantus & Novolog.  We reviewed rule of 15 to treat hypoglycemia.  Everette expressed understanding of using healthy plate to build balanced, portion & CHO controlled meals. We practiced reading nutrition facts labels & counting CHOs during today's session. He was provided the ADA handouts best foods & snacks for people with diabetes.    RECOMMENDATIONS:  Start a food journal.  Continue logging BG and Novolog dosing.  Use healthy plate to build balanced, CHO controlled meals.  Continue DSMES.    TOPICS DISCUSSED TODAY:  WHAT IS DIABETES? Minutes: 30  WHAT CAN I EAT? 30      Next provider visit is scheduled for 1/31/25       DATE DSMES TOPIC EVALUATION     1/29/2025 WHAT IS DIABETES?   Role of the normal pancreas in energy balance and blood glucose control   The defect seen in diabetes   Signs & symptoms of diabetes   Diagnosis of diabetes   Types of diabetes   Blood glucose targets in non-pregnant & non-pregnant adults       The participant knows  Their type of diabetes: Yes   The basic physiologic defect: Yes  Blood glucose targets: Yes     DATE DSMES

## 2025-01-29 NOTE — TELEPHONE ENCOUNTER
Called [facility] Department Regarding Referral request (Faxed 1/17/25 & again on 1/26th. Spoke with Vale ([phone]) who confirmed Referral had been received & patient is scheduled for an appointment on Friday, January 31st @ 4:15 pm.

## 2025-01-30 ENCOUNTER — HOSPITAL ENCOUNTER (OUTPATIENT)
Facility: HOSPITAL | Age: 55
Discharge: HOME | End: 2025-01-30
Attending: PODIATRIST
Payer: MEDICAID

## 2025-01-30 VITALS
TEMPERATURE: 97.7 F | RESPIRATION RATE: 16 BRPM | SYSTOLIC BLOOD PRESSURE: 144 MMHG | DIASTOLIC BLOOD PRESSURE: 72 MMHG | HEART RATE: 86 BPM

## 2025-01-30 DIAGNOSIS — L97.414 DIABETIC ULCER OF RIGHT MIDFOOT ASSOCIATED WITH TYPE 2 DIABETES MELLITUS, WITH NECROSIS OF BONE (HCC): Primary | ICD-10-CM

## 2025-01-30 DIAGNOSIS — E11.621 DIABETIC ULCER OF RIGHT MIDFOOT ASSOCIATED WITH TYPE 2 DIABETES MELLITUS, WITH NECROSIS OF BONE (HCC): Primary | ICD-10-CM

## 2025-01-30 PROCEDURE — 97605 NEG PRS WND THER DME<=50SQCM: CPT

## 2025-01-30 RX ORDER — MUPIROCIN 20 MG/G
OINTMENT TOPICAL ONCE
OUTPATIENT
Start: 2025-01-30 | End: 2025-01-30

## 2025-01-30 RX ORDER — GENTAMICIN SULFATE 1 MG/G
OINTMENT TOPICAL ONCE
OUTPATIENT
Start: 2025-01-30 | End: 2025-01-30

## 2025-01-30 RX ORDER — LIDOCAINE HYDROCHLORIDE 20 MG/ML
JELLY TOPICAL ONCE
OUTPATIENT
Start: 2025-01-30 | End: 2025-01-30

## 2025-01-30 RX ORDER — LIDOCAINE 40 MG/G
CREAM TOPICAL ONCE
OUTPATIENT
Start: 2025-01-30 | End: 2025-01-30

## 2025-01-30 RX ORDER — GINSENG 100 MG
CAPSULE ORAL ONCE
OUTPATIENT
Start: 2025-01-30 | End: 2025-01-30

## 2025-01-30 RX ORDER — LIDOCAINE HYDROCHLORIDE 40 MG/ML
SOLUTION TOPICAL ONCE
OUTPATIENT
Start: 2025-01-30 | End: 2025-01-30

## 2025-01-30 RX ORDER — BACITRACIN ZINC AND POLYMYXIN B SULFATE 500; 1000 [USP'U]/G; [USP'U]/G
OINTMENT TOPICAL ONCE
OUTPATIENT
Start: 2025-01-30 | End: 2025-01-30

## 2025-01-30 RX ORDER — SILVER SULFADIAZINE 10 MG/G
CREAM TOPICAL ONCE
OUTPATIENT
Start: 2025-01-30 | End: 2025-01-30

## 2025-01-30 RX ORDER — CLOBETASOL PROPIONATE 0.5 MG/G
OINTMENT TOPICAL ONCE
OUTPATIENT
Start: 2025-01-30 | End: 2025-01-30

## 2025-01-30 RX ORDER — TRIAMCINOLONE ACETONIDE 1 MG/G
OINTMENT TOPICAL ONCE
OUTPATIENT
Start: 2025-01-30 | End: 2025-01-30

## 2025-01-30 RX ORDER — LIDOCAINE 50 MG/G
OINTMENT TOPICAL ONCE
OUTPATIENT
Start: 2025-01-30 | End: 2025-01-30

## 2025-01-30 RX ORDER — BETAMETHASONE DIPROPIONATE 0.5 MG/G
CREAM TOPICAL ONCE
OUTPATIENT
Start: 2025-01-30 | End: 2025-01-30

## 2025-01-30 RX ORDER — SODIUM CHLOR/HYPOCHLOROUS ACID 0.033 %
SOLUTION, IRRIGATION IRRIGATION ONCE
OUTPATIENT
Start: 2025-01-30 | End: 2025-01-30

## 2025-01-30 RX ORDER — NEOMYCIN/BACITRACIN/POLYMYXINB 3.5-400-5K
OINTMENT (GRAM) TOPICAL ONCE
OUTPATIENT
Start: 2025-01-30 | End: 2025-01-30

## 2025-01-30 ASSESSMENT — PAIN DESCRIPTION - ORIENTATION: ORIENTATION: RIGHT

## 2025-01-30 ASSESSMENT — PAIN DESCRIPTION - LOCATION: LOCATION: LEG;FOOT

## 2025-01-30 ASSESSMENT — PAIN SCALES - GENERAL: PAINLEVEL_OUTOF10: 6

## 2025-01-30 ASSESSMENT — PAIN DESCRIPTION - DESCRIPTORS: DESCRIPTORS: ACHING;CRUSHING;DISCOMFORT

## 2025-01-30 NOTE — FLOWSHEET NOTE
Negative Pressure    NAME:  Milton Hughes  YOB: 1970  MEDICAL RECORD NUMBER:  [id number]  DATE:  1/30/2025    Applied Negative Pressure to right foot wound(s)/ulcer(s).  [x] Applied skin barrier prep to arncho-wound.   [x] Cut strips of plastic drape to picture frame wound so that rancho-wound is     covered with the drape.   [x] If bridging dressing to less prominent site, cover any intact skin that will come in contact with the Negative Pressure Therapy sponge, gauze or channel drain with plastic drape.  The sponge should never touch intact skin.   [x] Cut sponge, gauze or channel drain to size which will fit into the wound/ulcer bed without being forced.   [x] Be sure the sponge is large enough to hold the entire round plastic flange which is attached to the tubing.  Never allow flange to be larger than the sponge or it will produce suction damaging intact skin.  Total number of individual pieces of foam used within the wound bed: 2 (one used to bridge)  [x] If bridging the dressing away from the primary site, be sure the bridge leads to a piece of sponge large enough to hold the entire flange without allowing any of the flange to overlap onto intact skin.   [x] Covered sponge, gauze or channel drain with plastic drape.   [x] Cut a hole in this plastic drape directly over the sponge the same size as the plastic drain tubing.   [x] Removed plastic liner from flange and apply it directly over the hole you cut.   [x] Removed the plastic cover from the flange.   [x] Attached the tubing to the wound/ulcer Negative Pressure Therapy and turn it on to be sure a vacuum is created and that there are no leaks.   [x] If air leaks occur, use plastic drape to patch them.   [x] Secured Negative Pressure Therapy dressing with ace wrap loosely if located on an extremity. Maintain tubing outside of ace wrap. Tubing must not exert pressure on intact skin.    Response to treatment: Yes     Applied per

## 2025-02-04 ENCOUNTER — OFFICE VISIT (OUTPATIENT)
Age: 55
End: 2025-02-04
Payer: MEDICAID

## 2025-02-04 DIAGNOSIS — Z79.4 TYPE 2 DIABETES MELLITUS WITH OTHER CIRCULATORY COMPLICATION, WITH LONG-TERM CURRENT USE OF INSULIN (HCC): Primary | ICD-10-CM

## 2025-02-04 DIAGNOSIS — E11.59 TYPE 2 DIABETES MELLITUS WITH OTHER CIRCULATORY COMPLICATION, WITH LONG-TERM CURRENT USE OF INSULIN (HCC): Primary | ICD-10-CM

## 2025-02-04 PROCEDURE — G0108 DIAB MANAGE TRN  PER INDIV: HCPCS

## 2025-02-04 NOTE — PROGRESS NOTES
[facility]  Diabetes Self-Management Education & Support Program  Encounter Note      SUMMARY  Diabetes self-care management training was completed related to reducing risks and healthy eating. The participant will return on February 25 to continue DSMES related to monitoring and physical activity. The participant did not identify SMART Goal(s) and will practice knowledge and skills related to reducing risks and healthy eating to improve diabetes self-management.        EVALUATION:  Mr Hughes expressed understanding of the role of vaccinations, dilated eye, foot & dental exams in preventing DM complications & the relationship between diabetes & heart, nerve, kidney, sleep and eye health. He plans to schedule dental & dilated eye exams. I performed a left foot exam today which showed decreased sensation , good pedal pulse. He has a wound to right foot with a wound vac in place. He shared needs a full thickness skin graft after tendon is debrided.   Everette is using CGM to monitor BG with a recent time in range of 90 %, high 10 %.    RECOMMENDATIONS:  Use healthy plate to build balanced, CHO controlled meals.  Increase non starchy vegetables in diet.   Schedule dilated eye & dental appts.  Continue DSMES.    TOPICS DISCUSSED TODAY:  HOW DO I STAY HEALTHY? 30  WHAT CAN I EAT? 30      Next provider visit is scheduled for 2/7/25       DATE DSMES TOPIC EVALUATION     2/4/2025 HOW DO I STAY HEALTHY?   Prevention   Vaccinations   Preconception care (if applicable)  Examinations   Eye    Foot   Diabetic complications' prevention   Dental health   Heart health   Kidney Health   Nerve health   Sleep health      The participant has a personal diabetes care record to keep abreast of diabetes health No , labs reviewed today             DATE DSMES TOPIC EVALUATION     2/4/2025 WHAT CAN I EAT?   General principles   Determining a healthy weight   Nutritional terms & tools   Healthy Plate method

## 2025-02-07 ENCOUNTER — HOSPITAL ENCOUNTER (OUTPATIENT)
Facility: HOSPITAL | Age: 55
Discharge: HOME | End: 2025-02-07
Attending: SURGERY
Payer: MEDICAID

## 2025-02-07 VITALS
RESPIRATION RATE: 16 BRPM | SYSTOLIC BLOOD PRESSURE: 129 MMHG | TEMPERATURE: 97.9 F | HEART RATE: 87 BPM | DIASTOLIC BLOOD PRESSURE: 70 MMHG

## 2025-02-07 DIAGNOSIS — E11.621 DIABETIC ULCER OF RIGHT MIDFOOT ASSOCIATED WITH TYPE 2 DIABETES MELLITUS, WITH NECROSIS OF BONE (HCC): Primary | ICD-10-CM

## 2025-02-07 DIAGNOSIS — L97.414 DIABETIC ULCER OF RIGHT MIDFOOT ASSOCIATED WITH TYPE 2 DIABETES MELLITUS, WITH NECROSIS OF BONE (HCC): Primary | ICD-10-CM

## 2025-02-07 PROCEDURE — 11043 DBRDMT MUSC&/FSCA 1ST 20/<: CPT

## 2025-02-07 RX ORDER — SILVER SULFADIAZINE 10 MG/G
CREAM TOPICAL ONCE
OUTPATIENT
Start: 2025-02-07 | End: 2025-02-07

## 2025-02-07 RX ORDER — LIDOCAINE 50 MG/G
OINTMENT TOPICAL ONCE
OUTPATIENT
Start: 2025-02-07 | End: 2025-02-07

## 2025-02-07 RX ORDER — TRIAMCINOLONE ACETONIDE 1 MG/G
OINTMENT TOPICAL ONCE
OUTPATIENT
Start: 2025-02-07 | End: 2025-02-07

## 2025-02-07 RX ORDER — SODIUM CHLOR/HYPOCHLOROUS ACID 0.033 %
SOLUTION, IRRIGATION IRRIGATION ONCE
OUTPATIENT
Start: 2025-02-07 | End: 2025-02-07

## 2025-02-07 RX ORDER — CLOBETASOL PROPIONATE 0.5 MG/G
OINTMENT TOPICAL ONCE
OUTPATIENT
Start: 2025-02-07 | End: 2025-02-07

## 2025-02-07 RX ORDER — LIDOCAINE HYDROCHLORIDE 20 MG/ML
JELLY TOPICAL ONCE
OUTPATIENT
Start: 2025-02-07 | End: 2025-02-07

## 2025-02-07 RX ORDER — GINSENG 100 MG
CAPSULE ORAL ONCE
OUTPATIENT
Start: 2025-02-07 | End: 2025-02-07

## 2025-02-07 RX ORDER — BETAMETHASONE DIPROPIONATE 0.5 MG/G
CREAM TOPICAL ONCE
OUTPATIENT
Start: 2025-02-07 | End: 2025-02-07

## 2025-02-07 RX ORDER — NEOMYCIN/BACITRACIN/POLYMYXINB 3.5-400-5K
OINTMENT (GRAM) TOPICAL ONCE
OUTPATIENT
Start: 2025-02-07 | End: 2025-02-07

## 2025-02-07 RX ORDER — MUPIROCIN 20 MG/G
OINTMENT TOPICAL ONCE
OUTPATIENT
Start: 2025-02-07 | End: 2025-02-07

## 2025-02-07 RX ORDER — LIDOCAINE HYDROCHLORIDE 40 MG/ML
SOLUTION TOPICAL ONCE
OUTPATIENT
Start: 2025-02-07 | End: 2025-02-07

## 2025-02-07 RX ORDER — BACITRACIN ZINC AND POLYMYXIN B SULFATE 500; 1000 [USP'U]/G; [USP'U]/G
OINTMENT TOPICAL ONCE
OUTPATIENT
Start: 2025-02-07 | End: 2025-02-07

## 2025-02-07 RX ORDER — SULFAMETHOXAZOLE AND TRIMETHOPRIM 800; 160 MG/1; MG/1
1 TABLET ORAL 2 TIMES DAILY
COMMUNITY
Start: 2025-02-01

## 2025-02-07 RX ORDER — GENTAMICIN SULFATE 1 MG/G
OINTMENT TOPICAL ONCE
OUTPATIENT
Start: 2025-02-07 | End: 2025-02-07

## 2025-02-07 RX ORDER — LIDOCAINE 40 MG/G
CREAM TOPICAL ONCE
OUTPATIENT
Start: 2025-02-07 | End: 2025-02-07

## 2025-02-07 ASSESSMENT — PAIN DESCRIPTION - DESCRIPTORS: DESCRIPTORS: ACHING

## 2025-02-07 ASSESSMENT — PAIN SCALES - GENERAL: PAINLEVEL_OUTOF10: 7

## 2025-02-07 ASSESSMENT — PAIN DESCRIPTION - ORIENTATION: ORIENTATION: RIGHT

## 2025-02-07 ASSESSMENT — PAIN DESCRIPTION - LOCATION: LOCATION: LEG;FOOT

## 2025-02-07 NOTE — FLOWSHEET NOTE
02/07/25 1308   Wound 12/30/24 Foot Right # 1   Date First Assessed/Time First Assessed: 12/30/24 0935   Present on Original Admission: Yes  Wound Approximate Age at First Assessment (Weeks): 4 weeks  Primary Wound Type: Diabetic Ulcer  Location: Foot  Wound Location Orientation: Right  Wound Descr...   Wound Image    Wound Etiology Diabetic   Dressing Status Old drainage noted   Wound Cleansed Soap and water   Dressing/Treatment Negative pressure wound therapy   Offloading for Diabetic Foot Ulcers Post op shoe   Wound Length (cm) 7 cm   Wound Width (cm) 4.5 cm   Wound Depth (cm) 0.5 cm   Wound Surface Area (cm^2) 31.5 cm^2   Change in Wound Size % (l*w) 45.31   Wound Volume (cm^3) 15.75 cm^3   Wound Healing % 54   Wound Assessment Pink/red;Exposed structure tendon;Slough   Drainage Amount Moderate (25-50%)   Drainage Description Serosanguinous   Odor None   Haily-wound Assessment Maceration   Margins Undefined edges   Wound Thickness Description not for Pressure Injury Full thickness         /70   Pulse 87   Temp 97.9 °F (36.6 °C) (Temporal)   Resp 16      From: Evelin CAMPUZANO  To: E-Visit Providers  Sent: 1/1/2021 11:43 AM CST  Subject: Urinary tract infection (UTI)    Urinary tract infection (UTI)  --------------------------------    Question: To help us route your E-Visit to the right provider, where is your current residence?  Answer: [place]    Question: Have you seen a healthcare provider for this complaint within the last 7 days?  Answer: No    Question: Which of the following symptoms are you experiencing: painful urination, difficult urination, change in urine appearance or smell?  Answer: Change in urine appearance or smell    Question: Have you had any of the following exposures in the past 14 days?  Answer: None    Question: Do you have any of the following symptoms?  Answer: None    Question: Have you traveled in the past 14 days?  Answer: No    Question: If you have pain when passing urine, which of these apply: burning sensation, pain on the inside, pain on or near the outside, or none of the above?  Answer: I have a burning sensation    Question: Are you able to pass urine?  Answer: Yes, I can pass urine.    Question: How long have you had pain or difficulty passing urine?  Answer: More than two days but less than one week    Question: Do you have a fever?  Answer: No, I do not have a fever    Question: Do you have any of the following symptoms: back pain, vomiting, diarrhea, shaking chills, or none of the above?   Answer: None of the above    Question: Do you have belly pain with this illness: pressure below the belly button, pain across the lower belly, widespread belly pain, shaking chills, or none of the above?  Answer: None of the above    Question: Do you have a sense of urgency when you need to pass urine?  Answer: No, sensation is normal    Question: What does your urine look like?  Answer: It is cloudy    Question: Do you have any of the following: blood, dark red or dark brown urine, an unusual smell, or none of the above?  Answer: None of  the above    Question: Do you have any unusual discharge from the vagina?  Answer: No    Question: Do you have any sores on your genitals?  Answer: No    Question: Have you had any kidney problems in the past?  Answer: No    Question: Have you been treated for a urinary/bladder infection in the past?  Answer: Yes    Question: How long ago were you treated?  Answer: More than 4 weeks ago    Question: Within the past 3 months, have you had any surgery on your kidneys or bladder, or have you had a tube inserted to collect your urine?  Answer: No, I have not had either    Question: Have you had similar symptoms in the past?  Answer: Yes, I have had similar symptoms more than once before    Question: If you had similar symptoms in the past, did any of the following work: antibiotics, cranberry juice, pills for yeast, cream for yeast, don't remember, or other?  Answer: Antibiotics    Question: Please provide additional detail on antibiotics or \"Other\" treatment:  Answer: Macrobid, bactrim    Question: Have you taken antibiotics in the last 30 days?  Answer: I have not been on any antibiotics    Question: Do you have any chronic illnesses such as diabetes, heart disease, lung disease, or any illness that would weaken your ability to fight infection?  Answer: No    Question: Do you take any medications that can suppress the immune system (chemotherapy, steroids, transplant antirejection medications?   Answer: No    Question: Are you pregnant?  Answer: I am confident that I am not pregnant    Question: Are you breast-feeding?  Answer: No    Question: Do you need a work/school excuse letter?  Answer: No    Question: Is there anything else you would like to add?  Answer: I have had uti’s in the past with similar symptoms    Question: Thank you for completing this questionnaire. One or more of your responses may indicate that a face-to-face evaluation of these symptoms with a physician is warranted. If that is necessary, what  is the preferred telephone number that we can use to contact you?  Answer: [phone]

## 2025-02-07 NOTE — FLOWSHEET NOTE
Negative Pressure    NAME:  Milton Hughes  YOB: 1970  MEDICAL RECORD NUMBER:  [id number]  DATE:  2/7/2025 02/07/25 1328   Negative Pressure Wound Therapy Foot Right;Dorsal;Lateral   Placement Date/Time: 12/09/24 1108   Present on Admission/Arrival: Yes  Location: Foot  Wound Location Orientation: Right;Dorsal;Lateral   Dressing Status Intact w/seal   Canister changed? No   Mode Continuous   Target Pressure (mmHg) 125   Wound 12/30/24 Foot Right # 1   Date First Assessed/Time First Assessed: 12/30/24 0935   Present on Original Admission: Yes  Wound Approximate Age at First Assessment (Weeks): 4 weeks  Primary Wound Type: Diabetic Ulcer  Location: Foot  Wound Location Orientation: Right  Wound Descr...   Dressing Status New dressing applied   Dressing/Treatment Hydrating gel;Non adherent;Negative pressure wound therapy  (@ 125 mm HG)   Post-Procedure Length (cm) 7.5 cm   Post-Procedure Width (cm) 5.8 cm   Post-Procedure Depth (cm) 1 cm   Post-Procedure Surface Area (cm^2) 43.5 cm^2   Post-Procedure Volume (cm^3) 43.5 cm^3       Applied Negative Pressure to Right foot wound(s)/ulcer(s).  [x] Applied skin barrier prep to rancho-wound.   [x] Cut strips of plastic drape to picture frame wound so that rancho-wound is     covered with the drape.   [x] If bridging dressing to less prominent site, cover any intact skin that will come in contact with the Negative Pressure Therapy sponge, gauze or channel drain with plastic drape.  The sponge should never touch intact skin.   [x] Cut sponge, gauze or channel drain to size which will fit into the wound/ulcer bed without being forced.   [x] Be sure the sponge is large enough to hold the entire round plastic flange which is attached to the tubing.  Never allow flange to be larger than the sponge or it will produce suction damaging intact skin.  Total number of individual pieces of foam used within the wound bed: 1  [x] If bridging the dressing away from the primary

## 2025-02-07 NOTE — WOUND CARE
Debridement Wound Care        Problem List Items Addressed This Visit          Endocrine    Diabetic ulcer of right midfoot associated with type 2 diabetes mellitus, with necrosis of bone (HCC) - Primary    Relevant Orders    Initiate Outpatient Wound Care Protocol     Has follow up appointment ith [facility] plastic surgery dept 2/28/25.  Plastic surgery want the wound to be further debrided before application of a full thickness graft  Procedure Note  Indications:  Based on my examination of this patient's wound(s)/ulcer(s) today, debridement is  required to promote healing and evaluate the wound base.    Performed by: Lay Daley DPM    Consent obtained: Yes    Time out taken: Yes    Debridement: excisional    Using #15 blade scalpel and forceps the wound(s)/ulcer(s) was/were sharply debrided down through and including the removal of    muscle/fascia    Devitalized Tissue Debrided: slough and necrotic/eschar    Pre Debridement Measurements:  Are located in the Wound/Ulcer Documentation Flow Sheet    Diabetic ulcer, muscle necrosis    Wound/Ulcer #: 1    Post Debridement Measurements:  Wound/Ulcer Descriptions are Pre Debridement except measurements:    Wound Care Documentation:  Negative Pressure Wound Therapy Foot Right;Dorsal;Lateral (Active)   Dressing Status Intact w/seal 01/30/25 1316   Canister changed? No 01/30/25 1316   Output (ml) 10 ml 01/02/25 0812   Unit Type activac 01/10/25 1032   Mode Continuous 01/30/25 1316   Target Pressure (mmHg) 125 01/30/25 1316   Intensity Low 12/30/24 0930   $$ Dressing Changed & Charged $ Standard NPWT less than or equal to 50 sq cm PER TX 01/30/25 1316   Number of pieces placed 1 01/13/25 0855   Dressing Type Non-adherent contact layer 01/30/25 1316   Dressing Change Due 01/17/25 01/13/25 0855   Number of days: 60       Wound 12/30/24 Foot Right # 1 (Active)   Wound Image   02/07/25 1308   Wound Etiology Diabetic 02/07/25 1308   Dressing Status Old drainage noted 02/07/25

## 2025-02-07 NOTE — PATIENT INSTRUCTIONS
Discharge Instructions [facility]  [address]   [address], [address]  [place] [address]   Telephone: [phone]     FAX [phone]    NAME:  Milton Hughes  YOB: 1970  MEDICAL RECORD NUMBER:  [id number]  DATE:  2/7/2025    CPT code:Debridement muscle/fascia (25777)    WOUND CARE ORDERS:  Right foot wounds :Cleanse with normal saline , apply primary dressing  hydrogel and adaptic then Wound vac at 125 mmHg . Pt./pcg/HH nurse to change (freq) 2x Weekly and as needed for compromise.Follow up with provider in 2 Week(s).   Home Health Agency: [facility]  TREATMENT ORDERS:    Elevate leg(s) above the level of the heart when sitting.   Avoid prolonged standing in one place.  Do no get dressing/wrap wet.  Follow Diet as prescribed:   [] Diet as tolerated: [] Calorie Diabetic Diet: Low carb and no Sugar [] No Added Salt:no more then 2,000 mg daily  [] Increase Protein: [] Limit the amount of liquid you are drinking and avoid drinking in between meals (limit to 2 quarts daily)     Return Appointment:  [x] Return Appointment: With Dr. Daley in  2 Week(s)  [] Nurse Visit :  days  [] Ordered tests:    Electronically signed Aida Longoria RN on 2/7/2025 at 1:24 PM     Wound Care Center Information: Should you experience any significant changes in your wound(s) or have questions about your wound care, please contact the [facility] at MONDAY - FRIDAY 8:00 am - 4:30.  If you need help with your wound outside these hours and cannot wait until we are again available, contact your PCP or go to the hospital emergency room.   PLEASE NOTE: IF YOU ARE UNABLE TO OBTAIN WOUND SUPPLIES, CONTINUE TO USE THE SUPPLIES YOU HAVE AVAILABLE UNTIL YOU ARE ABLE TO REACH US. IT IS MOST IMPORTANT TO KEEP THE WOUND COVERED AT ALL TIMES.     Physician Signature:_______________________    Date: ___________ Time:  ____________

## 2025-02-17 DIAGNOSIS — E11.52 TYPE 2 DIABETES MELLITUS WITH DIABETIC PERIPHERAL ANGIOPATHY AND GANGRENE, WITH LONG-TERM CURRENT USE OF INSULIN (HCC): Primary | ICD-10-CM

## 2025-02-17 DIAGNOSIS — Z79.4 TYPE 2 DIABETES MELLITUS WITH DIABETIC PERIPHERAL ANGIOPATHY AND GANGRENE, WITH LONG-TERM CURRENT USE OF INSULIN (HCC): Primary | ICD-10-CM

## 2025-02-17 RX ORDER — ACYCLOVIR 800 MG/1
TABLET ORAL
Qty: 6 EACH | Refills: 3 | Status: SHIPPED | OUTPATIENT
Start: 2025-02-17 | End: 2025-02-18

## 2025-02-17 RX ORDER — KETOROLAC TROMETHAMINE 30 MG/ML
1 INJECTION, SOLUTION INTRAMUSCULAR; INTRAVENOUS DAILY
Qty: 1 EACH | Refills: 0 | Status: SHIPPED | OUTPATIENT
Start: 2025-02-17 | End: 2025-02-18

## 2025-02-19 ENCOUNTER — PREP FOR PROCEDURE (OUTPATIENT)
Age: 55
End: 2025-02-19

## 2025-02-19 RX ORDER — SODIUM CHLORIDE 9 MG/ML
INJECTION, SOLUTION INTRAVENOUS PRN
Status: CANCELLED | OUTPATIENT
Start: 2025-02-19

## 2025-02-19 RX ORDER — SODIUM CHLORIDE 0.9 % (FLUSH) 0.9 %
5-40 SYRINGE (ML) INJECTION PRN
Status: CANCELLED | OUTPATIENT
Start: 2025-02-19

## 2025-02-19 RX ORDER — SODIUM CHLORIDE 0.9 % (FLUSH) 0.9 %
5-40 SYRINGE (ML) INJECTION EVERY 12 HOURS SCHEDULED
Status: CANCELLED | OUTPATIENT
Start: 2025-02-19

## 2025-02-21 ENCOUNTER — HOSPITAL ENCOUNTER (OUTPATIENT)
Facility: HOSPITAL | Age: 55
Discharge: HOME | End: 2025-02-21
Attending: SURGERY
Payer: MEDICAID

## 2025-02-21 ENCOUNTER — ANESTHESIA EVENT (OUTPATIENT)
Facility: HOSPITAL | Age: 55
End: 2025-02-21
Payer: MEDICAID

## 2025-02-21 VITALS
TEMPERATURE: 97.9 F | RESPIRATION RATE: 16 BRPM | DIASTOLIC BLOOD PRESSURE: 81 MMHG | SYSTOLIC BLOOD PRESSURE: 141 MMHG | HEART RATE: 89 BPM

## 2025-02-21 DIAGNOSIS — L97.414 DIABETIC ULCER OF RIGHT MIDFOOT ASSOCIATED WITH TYPE 2 DIABETES MELLITUS, WITH NECROSIS OF BONE (HCC): Primary | ICD-10-CM

## 2025-02-21 DIAGNOSIS — E11.621 DIABETIC ULCER OF RIGHT MIDFOOT ASSOCIATED WITH TYPE 2 DIABETES MELLITUS, WITH NECROSIS OF BONE (HCC): Primary | ICD-10-CM

## 2025-02-21 PROCEDURE — 99213 OFFICE O/P EST LOW 20 MIN: CPT

## 2025-02-21 PROCEDURE — 97605 NEG PRS WND THER DME<=50SQCM: CPT

## 2025-02-21 RX ORDER — SODIUM CHLORIDE 0.9 % (FLUSH) 0.9 %
5-40 SYRINGE (ML) INJECTION PRN
Status: CANCELLED | OUTPATIENT
Start: 2025-02-21

## 2025-02-21 RX ORDER — OXYCODONE HYDROCHLORIDE 5 MG/1
5 TABLET ORAL
Status: CANCELLED | OUTPATIENT
Start: 2025-02-21 | End: 2025-02-22

## 2025-02-21 RX ORDER — FENTANYL CITRATE 50 UG/ML
25 INJECTION, SOLUTION INTRAMUSCULAR; INTRAVENOUS EVERY 5 MIN PRN
Status: CANCELLED | OUTPATIENT
Start: 2025-02-21

## 2025-02-21 RX ORDER — SILVER SULFADIAZINE 10 MG/G
CREAM TOPICAL ONCE
OUTPATIENT
Start: 2025-02-21 | End: 2025-02-21

## 2025-02-21 RX ORDER — MUPIROCIN 20 MG/G
OINTMENT TOPICAL ONCE
OUTPATIENT
Start: 2025-02-21 | End: 2025-02-21

## 2025-02-21 RX ORDER — SODIUM CHLORIDE 0.9 % (FLUSH) 0.9 %
5-40 SYRINGE (ML) INJECTION EVERY 12 HOURS SCHEDULED
Status: CANCELLED | OUTPATIENT
Start: 2025-02-21

## 2025-02-21 RX ORDER — LIDOCAINE HYDROCHLORIDE 20 MG/ML
JELLY TOPICAL ONCE
OUTPATIENT
Start: 2025-02-21 | End: 2025-02-21

## 2025-02-21 RX ORDER — NEOMYCIN/BACITRACIN/POLYMYXINB 3.5-400-5K
OINTMENT (GRAM) TOPICAL ONCE
OUTPATIENT
Start: 2025-02-21 | End: 2025-02-21

## 2025-02-21 RX ORDER — LIDOCAINE 40 MG/G
CREAM TOPICAL ONCE
OUTPATIENT
Start: 2025-02-21 | End: 2025-02-21

## 2025-02-21 RX ORDER — PROCHLORPERAZINE EDISYLATE 5 MG/ML
5 INJECTION INTRAMUSCULAR; INTRAVENOUS
Status: CANCELLED | OUTPATIENT
Start: 2025-02-21 | End: 2025-02-22

## 2025-02-21 RX ORDER — SODIUM CHLORIDE 9 MG/ML
INJECTION, SOLUTION INTRAVENOUS PRN
Status: CANCELLED | OUTPATIENT
Start: 2025-02-21

## 2025-02-21 RX ORDER — SODIUM CHLOR/HYPOCHLOROUS ACID 0.033 %
SOLUTION, IRRIGATION IRRIGATION ONCE
OUTPATIENT
Start: 2025-02-21 | End: 2025-02-21

## 2025-02-21 RX ORDER — NALOXONE HYDROCHLORIDE 0.4 MG/ML
INJECTION, SOLUTION INTRAMUSCULAR; INTRAVENOUS; SUBCUTANEOUS PRN
Status: CANCELLED | OUTPATIENT
Start: 2025-02-21

## 2025-02-21 RX ORDER — GENTAMICIN SULFATE 1 MG/G
OINTMENT TOPICAL ONCE
OUTPATIENT
Start: 2025-02-21 | End: 2025-02-21

## 2025-02-21 RX ORDER — BETAMETHASONE DIPROPIONATE 0.5 MG/G
CREAM TOPICAL ONCE
OUTPATIENT
Start: 2025-02-21 | End: 2025-02-21

## 2025-02-21 RX ORDER — GINSENG 100 MG
CAPSULE ORAL ONCE
OUTPATIENT
Start: 2025-02-21 | End: 2025-02-21

## 2025-02-21 RX ORDER — CLOBETASOL PROPIONATE 0.5 MG/G
OINTMENT TOPICAL ONCE
OUTPATIENT
Start: 2025-02-21 | End: 2025-02-21

## 2025-02-21 RX ORDER — HYDROMORPHONE HYDROCHLORIDE 1 MG/ML
0.5 INJECTION, SOLUTION INTRAMUSCULAR; INTRAVENOUS; SUBCUTANEOUS EVERY 5 MIN PRN
Status: CANCELLED | OUTPATIENT
Start: 2025-02-21

## 2025-02-21 RX ORDER — ONDANSETRON 2 MG/ML
4 INJECTION INTRAMUSCULAR; INTRAVENOUS
Status: CANCELLED | OUTPATIENT
Start: 2025-02-21 | End: 2025-02-22

## 2025-02-21 RX ORDER — LIDOCAINE 50 MG/G
OINTMENT TOPICAL ONCE
OUTPATIENT
Start: 2025-02-21 | End: 2025-02-21

## 2025-02-21 RX ORDER — TRIAMCINOLONE ACETONIDE 1 MG/G
OINTMENT TOPICAL ONCE
OUTPATIENT
Start: 2025-02-21 | End: 2025-02-21

## 2025-02-21 RX ORDER — BACITRACIN ZINC AND POLYMYXIN B SULFATE 500; 1000 [USP'U]/G; [USP'U]/G
OINTMENT TOPICAL ONCE
OUTPATIENT
Start: 2025-02-21 | End: 2025-02-21

## 2025-02-21 RX ORDER — LIDOCAINE HYDROCHLORIDE 40 MG/ML
SOLUTION TOPICAL ONCE
OUTPATIENT
Start: 2025-02-21 | End: 2025-02-21

## 2025-02-21 ASSESSMENT — PAIN DESCRIPTION - DESCRIPTORS: DESCRIPTORS: SHOOTING;BURNING

## 2025-02-21 ASSESSMENT — PAIN DESCRIPTION - FREQUENCY: FREQUENCY: CONTINUOUS

## 2025-02-21 ASSESSMENT — PAIN SCALES - GENERAL: PAINLEVEL_OUTOF10: 7

## 2025-02-21 ASSESSMENT — PAIN DESCRIPTION - ORIENTATION: ORIENTATION: RIGHT

## 2025-02-21 ASSESSMENT — PAIN DESCRIPTION - LOCATION: LOCATION: FOOT;ARM

## 2025-02-21 ASSESSMENT — PAIN - FUNCTIONAL ASSESSMENT: PAIN_FUNCTIONAL_ASSESSMENT: PREVENTS OR INTERFERES SOME ACTIVE ACTIVITIES AND ADLS
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
## 2025-02-21 NOTE — PATIENT INSTRUCTIONS
Discharge Instructions for  [facility]  [address] [address]  Phone: [phone] Fax: [phone]    NAME:  Milton Hughes  YOB: 1970  MEDICAL RECORD NUMBER:  [id number]  DATE:  February 21, 2025    [facility]    WOUND CARE ORDERS:  Right foot wound - Cleanse with mild soap and water then pat dry. Apply hydrogel and adaptic to wound. Apply wound vac to wound as listed below.     Negative Pressure:  Wound Location: right foot    Pressure @   125mmHg   [ X] continuous   [ X]black foam   Change Dressing: [X ] Two times per week       Activity:  [] Elevate leg(s) above the level of the heart when sitting.  [] Avoid prolonged standing in one place.   [] Do no get dressing/wrap wet.       Dietary:  [] Diet as tolerated      [] Diabetic Diet            [] Increase Protein: examples (Meat, cheese, eggs, greek yogurt, fish, nuts)          [] Alin Therapeutic Nutrition Powder  [] Other:        Return Appointment:  [x] Return Appointment: With Dr. Lay Daley in 2 Week(s)  [] Nurse Visit :   [] Ordered tests:      Electronically signed Carmen Rust RN on 2/21/2025 at 9:27 AM     Wound Care Center Information: Should you experience any significant changes in your wound(s) or have questions about your wound care, please contact the [facility] at MONDAY - FRIDAY 8:00 am - 4:30.  If you need help with your wound outside these hours and cannot wait until we are again available, contact your PCP or go to the hospital emergency room.     PLEASE NOTE: IF YOU ARE UNABLE TO OBTAIN WOUND SUPPLIES, CONTINUE TO USE THE SUPPLIES YOU HAVE AVAILABLE UNTIL YOU ARE ABLE TO REACH US. IT IS MOST IMPORTANT TO KEEP THE WOUND COVERED AT ALL TIMES.     Physician Signature:_______________________    Date: ___________ Time:  ____________

## 2025-02-21 NOTE — FLOWSHEET NOTE
02/21/25 1331   Wound 12/30/24 Foot Right # 1   Date First Assessed/Time First Assessed: 12/30/24 0935   Present on Original Admission: Yes  Wound Approximate Age at First Assessment (Weeks): 4 weeks  Primary Wound Type: Diabetic Ulcer  Location: Foot  Wound Location Orientation: Right  Wound Descr...   Wound Image    Wound Etiology Diabetic   Dressing Status Old drainage noted   Wound Cleansed Soap and water;Cleansed with saline   Dressing/Treatment Negative pressure wound therapy;Non adherent  (Adaptic, Wound Vac (Black Foam))   Offloading for Diabetic Foot Ulcers Post op shoe   Wound Length (cm) 6.1 cm   Wound Width (cm) 4.5 cm   Wound Depth (cm) 0.5 cm   Wound Surface Area (cm^2) 30.24 cm^2   Change in Wound Size % (l*w) 47.5   Wound Volume (cm^3) 15.12 cm^3   Wound Healing % 56   Wound Assessment Granulation tissue;Pink/red;Slough;Exposed structure tendon  (Approx 15 or less slough.)   Drainage Amount Moderate (25-50%)   Drainage Description Serosanguinous  (More Sanguinous than Serous.)   Odor None   Haily-wound Assessment Maceration;Fragile   Wound Thickness Description not for Pressure Injury Full thickness   BP (!) 141/81   Pulse 89   Temp 97.9 °F (36.6 °C) (Temporal)   Resp 16

## 2025-02-21 NOTE — ANESTHESIA PRE PROCEDURE
Department of Anesthesiology  Preprocedure Note       Name:  Milton Hughes   Age:  54 y.o.  :  1970                                          MRN:  [id number]         Date:  2025      Surgeon: Surgeon(s):  Lay Daley DPM    Procedure: Procedure(s):  DEBRIDEMENT OF WOUND, RIGHT FOOT    Medications prior to admission:   Prior to Admission medications    Medication Sig Start Date End Date Taking? Authorizing Provider   pregabalin (LYRICA) 100 MG capsule Take 1 capsule by mouth 3 times daily for 30 days. Max Daily Amount: 300 mg 25 Yes Poly Escalante APRN - CNP   insulin glargine (LANTUS) 100 UNIT/ML injection vial Inject 32 Units into the skin nightly 25  Yes Poly Escalante APRN - CNP   insulin aspart (NOVOLOG) 100 UNIT/ML injection vial Monitor Blood Glucose Levels 3 times daily before each meal 150-200 take 4 units, 201-250 take 6 units, 251-300 take 8 units, greater than 301 take 10 units. 25  Yes Poly Escalante APRN - CNP   glipiZIDE (GLUCOTROL) 10 MG tablet Take 1 tablet by mouth 2 times daily 24  Yes Poly Escalante APRN - CNP   lisinopril (PRINIVIL;ZESTRIL) 40 MG tablet Take 1 tablet by mouth daily 24  Yes Maik Cook MD   acetaminophen (TYLENOL) 500 MG tablet Take 2 tablets by mouth every 6 hours as needed for Pain 3/26/24  Yes Stacey Back PA-C       Current medications:    No current facility-administered medications for this encounter.     Current Outpatient Medications   Medication Sig Dispense Refill   • pregabalin (LYRICA) 100 MG capsule Take 1 capsule by mouth 3 times daily for 30 days. Max Daily Amount: 300 mg 90 capsule 0   • insulin glargine (LANTUS) 100 UNIT/ML injection vial Inject 32 Units into the skin nightly 10 mL 3   • insulin aspart (NOVOLOG) 100 UNIT/ML injection vial Monitor Blood Glucose Levels 3 times daily before each meal 150-200 take 4 units, 201-250 take 6 units, 251-300 take 8 units, greater than 301 take 10

## 2025-02-21 NOTE — FLOWSHEET NOTE
02/21/25 1445   Wound 12/30/24 Foot Right # 1   Date First Assessed/Time First Assessed: 12/30/24 0935   Present on Original Admission: Yes  Wound Approximate Age at First Assessment (Weeks): 4 weeks  Primary Wound Type: Diabetic Ulcer  Location: Foot  Wound Location Orientation: Right  Wound Descr...   Dressing/Treatment Hydrating gel;Non adherent;Negative pressure wound therapy  (@-125mmhg)     Negative Pressure    NAME:  Milton Hughes  YOB: 1970  MEDICAL RECORD NUMBER:  [id number]  DATE:  2/21/2025    Applied Negative Pressure to right foot wound(s)/ulcer(s).  [x] Applied skin barrier prep to rancho-wound.   [x] Cut strips of plastic drape to picture frame wound so that rancho-wound is     covered with the drape.   [x] If bridging dressing to less prominent site, cover any intact skin that will come in contact with the Negative Pressure Therapy sponge, gauze or channel drain with plastic drape.  The sponge should never touch intact skin.   [x] Cut sponge, gauze or channel drain to size which will fit into the wound/ulcer bed without being forced.   [x] Be sure the sponge is large enough to hold the entire round plastic flange which is attached to the tubing.  Never allow flange to be larger than the sponge or it will produce suction damaging intact skin.  Total number of individual pieces of foam used within the wound bed: 1 to wound, one to bridge  [x] If bridging the dressing away from the primary site, be sure the bridge leads to a piece of sponge large enough to hold the entire flange without allowing any of the flange to overlap onto intact skin.   [x] Covered sponge, gauze or channel drain with plastic drape.   [x] Cut a hole in this plastic drape directly over the sponge the same size as the plastic drain tubing.   [x] Removed plastic liner from flange and apply it directly over the hole you cut.   [x] Removed the plastic cover from the flange.   [] Attached the tubing to the wound/ulcer

## 2025-02-24 ENCOUNTER — HOSPITAL ENCOUNTER (OUTPATIENT)
Facility: HOSPITAL | Age: 55
Setting detail: OUTPATIENT SURGERY
Discharge: HOME | End: 2025-02-24
Attending: PODIATRIST | Admitting: PODIATRIST
Payer: MEDICAID

## 2025-02-24 ENCOUNTER — ANESTHESIA (OUTPATIENT)
Facility: HOSPITAL | Age: 55
End: 2025-02-24
Payer: MEDICAID

## 2025-02-24 VITALS
TEMPERATURE: 97.8 F | BODY MASS INDEX: 36.05 KG/M2 | HEART RATE: 86 BPM | OXYGEN SATURATION: 98 % | HEIGHT: 71 IN | SYSTOLIC BLOOD PRESSURE: 122 MMHG | DIASTOLIC BLOOD PRESSURE: 82 MMHG | RESPIRATION RATE: 18 BRPM | WEIGHT: 257.5 LBS

## 2025-02-24 DIAGNOSIS — G54.6 PHANTOM PAIN AFTER AMPUTATION OF LOWER EXTREMITY (HCC): ICD-10-CM

## 2025-02-24 DIAGNOSIS — L97.519 RIGHT FOOT ULCER, WITH UNSPECIFIED SEVERITY (HCC): Primary | ICD-10-CM

## 2025-02-24 LAB
GLUCOSE BLD STRIP.AUTO-MCNC: 121 MG/DL (ref 65–117)
GLUCOSE BLD STRIP.AUTO-MCNC: 142 MG/DL (ref 65–117)
SERVICE CMNT-IMP: ABNORMAL
SERVICE CMNT-IMP: ABNORMAL

## 2025-02-24 PROCEDURE — 3600000002 HC SURGERY LEVEL 2 BASE: Performed by: PODIATRIST

## 2025-02-24 PROCEDURE — 87186 SC STD MICRODIL/AGAR DIL: CPT

## 2025-02-24 PROCEDURE — 2580000003 HC RX 258: Performed by: ANESTHESIOLOGY

## 2025-02-24 PROCEDURE — 87205 SMEAR GRAM STAIN: CPT

## 2025-02-24 PROCEDURE — 7100000001 HC PACU RECOVERY - ADDTL 15 MIN: Performed by: PODIATRIST

## 2025-02-24 PROCEDURE — 6370000000 HC RX 637 (ALT 250 FOR IP): Performed by: PODIATRIST

## 2025-02-24 PROCEDURE — 6360000002 HC RX W HCPCS: Performed by: PODIATRIST

## 2025-02-24 PROCEDURE — 6360000002 HC RX W HCPCS

## 2025-02-24 PROCEDURE — 87075 CULTR BACTERIA EXCEPT BLOOD: CPT

## 2025-02-24 PROCEDURE — 87077 CULTURE AEROBIC IDENTIFY: CPT

## 2025-02-24 PROCEDURE — 82962 GLUCOSE BLOOD TEST: CPT

## 2025-02-24 PROCEDURE — 3700000001 HC ADD 15 MINUTES (ANESTHESIA): Performed by: PODIATRIST

## 2025-02-24 PROCEDURE — 3700000000 HC ANESTHESIA ATTENDED CARE: Performed by: PODIATRIST

## 2025-02-24 PROCEDURE — 7100000000 HC PACU RECOVERY - FIRST 15 MIN: Performed by: PODIATRIST

## 2025-02-24 PROCEDURE — 3600000012 HC SURGERY LEVEL 2 ADDTL 15MIN: Performed by: PODIATRIST

## 2025-02-24 PROCEDURE — 87070 CULTURE OTHR SPECIMN AEROBIC: CPT

## 2025-02-24 PROCEDURE — 2500000003 HC RX 250 WO HCPCS: Performed by: PODIATRIST

## 2025-02-24 PROCEDURE — 2709999900 HC NON-CHARGEABLE SUPPLY: Performed by: PODIATRIST

## 2025-02-24 RX ORDER — ONDANSETRON 2 MG/ML
INJECTION INTRAMUSCULAR; INTRAVENOUS
Status: DISCONTINUED | OUTPATIENT
Start: 2025-02-24 | End: 2025-02-24 | Stop reason: SDUPTHER

## 2025-02-24 RX ORDER — OXYCODONE AND ACETAMINOPHEN 5; 325 MG/1; MG/1
1 TABLET ORAL EVERY 6 HOURS PRN
Qty: 28 TABLET | Refills: 0 | Status: SHIPPED | OUTPATIENT
Start: 2025-02-24 | End: 2025-03-03

## 2025-02-24 RX ORDER — MIDAZOLAM HYDROCHLORIDE 2 MG/2ML
INJECTION, SOLUTION INTRAMUSCULAR; INTRAVENOUS
Status: DISCONTINUED | OUTPATIENT
Start: 2025-02-24 | End: 2025-02-24 | Stop reason: SDUPTHER

## 2025-02-24 RX ORDER — PREGABALIN 100 MG/1
CAPSULE ORAL
Qty: 90 CAPSULE | Refills: 0 | OUTPATIENT
Start: 2025-02-24

## 2025-02-24 RX ORDER — PROPOFOL 10 MG/ML
INJECTION, EMULSION INTRAVENOUS
Status: DISCONTINUED | OUTPATIENT
Start: 2025-02-24 | End: 2025-02-24 | Stop reason: SDUPTHER

## 2025-02-24 RX ORDER — SODIUM CHLORIDE, SODIUM LACTATE, POTASSIUM CHLORIDE, CALCIUM CHLORIDE 600; 310; 30; 20 MG/100ML; MG/100ML; MG/100ML; MG/100ML
INJECTION, SOLUTION INTRAVENOUS CONTINUOUS
Status: DISCONTINUED | OUTPATIENT
Start: 2025-02-24 | End: 2025-02-24 | Stop reason: HOSPADM

## 2025-02-24 RX ORDER — SODIUM CHLORIDE 9 MG/ML
INJECTION, SOLUTION INTRAVENOUS PRN
Status: DISCONTINUED | OUTPATIENT
Start: 2025-02-24 | End: 2025-02-24 | Stop reason: HOSPADM

## 2025-02-24 RX ORDER — LIDOCAINE HYDROCHLORIDE 10 MG/ML
INJECTION, SOLUTION EPIDURAL; INFILTRATION; INTRACAUDAL; PERINEURAL PRN
Status: DISCONTINUED | OUTPATIENT
Start: 2025-02-24 | End: 2025-02-24 | Stop reason: ALTCHOICE

## 2025-02-24 RX ORDER — SODIUM CHLORIDE 0.9 % (FLUSH) 0.9 %
5-40 SYRINGE (ML) INJECTION EVERY 12 HOURS SCHEDULED
Status: DISCONTINUED | OUTPATIENT
Start: 2025-02-24 | End: 2025-02-24 | Stop reason: HOSPADM

## 2025-02-24 RX ORDER — PREGABALIN 100 MG/1
100 CAPSULE ORAL 3 TIMES DAILY
Qty: 90 CAPSULE | Refills: 0 | Status: SHIPPED | OUTPATIENT
Start: 2025-02-24 | End: 2025-03-26

## 2025-02-24 RX ORDER — SODIUM CHLORIDE 0.9 % (FLUSH) 0.9 %
5-40 SYRINGE (ML) INJECTION PRN
Status: DISCONTINUED | OUTPATIENT
Start: 2025-02-24 | End: 2025-02-24 | Stop reason: HOSPADM

## 2025-02-24 RX ORDER — LIDOCAINE HYDROCHLORIDE 20 MG/ML
INJECTION, SOLUTION EPIDURAL; INFILTRATION; INTRACAUDAL; PERINEURAL
Status: DISCONTINUED | OUTPATIENT
Start: 2025-02-24 | End: 2025-02-24 | Stop reason: SDUPTHER

## 2025-02-24 RX ORDER — VANCOMYCIN 1.5 G/300ML
1500 INJECTION, SOLUTION INTRAVENOUS ONCE
Status: DISCONTINUED | OUTPATIENT
Start: 2025-02-24 | End: 2025-02-24 | Stop reason: SINTOL

## 2025-02-24 RX ORDER — CLINDAMYCIN PHOSPHATE 900 MG/50ML
900 INJECTION, SOLUTION INTRAVENOUS ONCE
Status: COMPLETED | OUTPATIENT
Start: 2025-02-24 | End: 2025-02-24

## 2025-02-24 RX ADMIN — Medication 3 AMPULE: at 14:49

## 2025-02-24 RX ADMIN — PROPOFOL 100 MCG/KG/MIN: 10 INJECTION, EMULSION INTRAVENOUS at 17:09

## 2025-02-24 RX ADMIN — SODIUM CHLORIDE, POTASSIUM CHLORIDE, SODIUM LACTATE AND CALCIUM CHLORIDE 25 ML: 600; 310; 30; 20 INJECTION, SOLUTION INTRAVENOUS at 14:49

## 2025-02-24 RX ADMIN — PROPOFOL 100 MG: 10 INJECTION, EMULSION INTRAVENOUS at 17:08

## 2025-02-24 RX ADMIN — WATER 2000 MG: 1 INJECTION INTRAMUSCULAR; INTRAVENOUS; SUBCUTANEOUS at 17:05

## 2025-02-24 RX ADMIN — CLINDAMYCIN PHOSPHATE 900 MG: 900 INJECTION, SOLUTION INTRAVENOUS at 17:02

## 2025-02-24 RX ADMIN — LIDOCAINE HYDROCHLORIDE 60 MG: 20 INJECTION, SOLUTION EPIDURAL; INFILTRATION; INTRACAUDAL; PERINEURAL at 17:08

## 2025-02-24 RX ADMIN — VANCOMYCIN 1500 MG: 1.5 INJECTION, SOLUTION INTRAVENOUS at 14:52

## 2025-02-24 RX ADMIN — SODIUM CHLORIDE, POTASSIUM CHLORIDE, SODIUM LACTATE AND CALCIUM CHLORIDE: 600; 310; 30; 20 INJECTION, SOLUTION INTRAVENOUS at 16:58

## 2025-02-24 RX ADMIN — ONDANSETRON HYDROCHLORIDE 4 MG: 2 INJECTION, SOLUTION INTRAMUSCULAR; INTRAVENOUS at 17:13

## 2025-02-24 RX ADMIN — MIDAZOLAM HYDROCHLORIDE 2 MG: 1 INJECTION, SOLUTION INTRAMUSCULAR; INTRAVENOUS at 16:58

## 2025-02-24 ASSESSMENT — PAIN - FUNCTIONAL ASSESSMENT
PAIN_FUNCTIONAL_ASSESSMENT: PREVENTS OR INTERFERES SOME ACTIVE ACTIVITIES AND ADLS
PAIN_FUNCTIONAL_ASSESSMENT: 0-10

## 2025-02-24 NOTE — FLOWSHEET NOTE
02/24/25 1823   Discharge Checklist   Ride and Caregiver Arranged Yes   Ride Caregiver Provider Lou Vallecillo (spouse)   Phone Number for Ride/Caregiver [phone]   Responsible party will drive the patient home Yes   Special Appliances/Equipment Post-op shoe   Mobility at Departure Wheelchair   Discharged with Documented Belongings Yes   Departure Mode With family   AVS Reviewed   AVS & discharge instructions reviewed with patient and/or representative? Yes   Reviewed instructions with Patient;Other (name and relationship in comment)  (Lou Vallecillo (spouse))   Level of Understanding Questions answered;Verbalized understanding

## 2025-02-24 NOTE — BRIEF OP NOTE
Brief Postoperative Note      Patient: Milton Hughes  YOB: 1970  MRN: [id number]    Date of Procedure: 2/24/2025    Pre-Op Diagnosis Codes:      * Right foot ulcer, with necrosis of muscle (HCC) [L97.513]    Post-Op Diagnosis: Same       Procedure(s):  DEBRIDEMENT OF WOUND, RIGHT FOOT    Surgeon(s):  Lay Daley DPM    Assistant:  * No surgical staff found *    Anesthesia: Monitor Anesthesia Care    Estimated Blood Loss (mL): Minimal    Complications: None    Specimens:   ID Type Source Tests Collected by Time Destination   1 : Wound culture right foot Tissue Foot SURGICAL PATHOLOGY Lay Daley DPM 2/24/2025 1726        Implants:  * No implants in log *      Drains:   Negative Pressure Wound Therapy Foot Right;Dorsal;Lateral (Active)   Dressing Status Intact w/seal 02/07/25 1328   Canister changed? No 02/07/25 1328   Mode Continuous 02/07/25 1328   Target Pressure (mmHg) 125 02/07/25 1328   $$ Dressing Changed & Charged $ Standard NPWT less than or equal to 50 sq cm PER TX 01/30/25 1316   Dressing Type Non-adherent contact layer 01/30/25 1316       Findings:  Infection Present At Time Of Surgery (PATOS) (choose all levels that have infection present):  No infection present  Other Findings: open wound right foot    Electronically signed by Lay Daley DPM on 2/24/2025 at 5:43 PM

## 2025-02-24 NOTE — ANESTHESIA POSTPROCEDURE EVALUATION
Department of Anesthesiology  Postprocedure Note    Patient: Milton Hughes  MRN: [id number]  YOB: 1970  Date of evaluation: 2/24/2025    Procedure Summary       Date: 02/24/25 Room / Location: [facility] MAIN OR  / [facility] MAIN OR    Anesthesia Start: 1658 Anesthesia Stop: 1743    Procedure: DEBRIDEMENT OF WOUND, RIGHT FOOT (Right: Foot) Diagnosis:       Right foot ulcer, with necrosis of muscle (HCC)      (Right foot ulcer, with necrosis of muscle (HCC) [L97.513])    Providers: Lay Daley DPM Responsible Provider: Giovanni Cardona MD    Anesthesia Type: MAC ASA Status: 3            Anesthesia Type: MAC    Thais Phase I: Thais Score: 10    Thais Phase II:      Anesthesia Post Evaluation    Patient location during evaluation: PACU  Patient participation: complete - patient participated  Level of consciousness: sleepy but conscious and responsive to verbal stimuli  Pain score: 1  Airway patency: patent  Nausea & Vomiting: no vomiting and no nausea  Cardiovascular status: blood pressure returned to baseline and hemodynamically stable  Respiratory status: acceptable  Hydration status: stable  Multimodal analgesia pain management approach  Pain management: adequate    No notable events documented.

## 2025-02-24 NOTE — FLOWSHEET NOTE
02/24/25 1745   Handoff   Communication Given Periop Handoff/Relief   Handoff phase Phase I receiving   Handoff Given To Jude DE LA ROSA   Handoff Received From Isabell OLIVAREZ & Evelia DE LA ROSA   Handoff Communication Face to Face   Time Handoff Given 7688

## 2025-02-24 NOTE — PERIOP NOTE
I spoke to Dr. Daley regarding patient flagged for MRSA in his chart.  Order received and read back to add Vancomycin 1.5gms iv in preop.  
No   recent    covid   exposure  pt reports     or  tests  done.   Pt   questioning     the   mrsa    dx  on the chart.    Date  and time  of last  culture provided.   Dr. Daley  spoke  to  pt  about  the    positive  mrsa   culture   from   January.     Pt   states  she  is not  allergic  to  oranges  .   Nose  wiped  with the  nozin per protocal    and   pt      sneezed  a   bit  after   than   states he  is having    trouble   breatheing   . No  distress noted   lungs   clear.  And    02   sat  at    100  on the  scope.   Pt    states  he  should of   takened      his   gummies   to   relax.      Pt   settling   down   no  further   complaints  of   sob.   Vancomycin   started on the    pump     and   after   5    minutes  into    infusion   pt  c/o    feeling  itching  to  left  arm  and  to  head.    Infusion   stopped  and  dr. Egan   notified.      No   further   complaint  of    itching   now   pt  complaining   of     foot     hurting.   States he  gets   very nervous  at   md office.  1528   Dr Egan into    talk  with pt     aware   vanc  is off I  will   contact  dr. Edi Daley   notified  about  the     vancomycin  possible  reaction  clindamycin  ordered  instead.        
seconds. CAUTION: If needed, Hibiclens/chlorhexidine may be used to clean the folds of skin of the legs (such as in the area of the groin) and on your buttocks and hips. However, do not use Hibiclens/Chlorhexidine above the neck or in the genital area (your bottom) or put inside any area of your body.  Turn the water back on and rinse.  Dry gently with a clean, freshly washed towel.  After your shower, do not use any powder, deodorant, perfumes or lotion.  Use clean, freshly washed towels and washcloths every time you shower.  Wear clean, freshly washed pajamas to bed the night before surgery.  Sleep on clean, freshly washed sheets.  Do not allow pets to sleep in your bed with you.  The Morning of your surgery:  Shower again thoroughly with an antibacterial soap, such as Dial.   If needed, ask someone for help to reach all areas of your body. Don’t forget to clean your belly button! Rinse.  With bottle of Hibiclens/Chlorhexidine in hand, turn water off.  Apply Hibiclens antiseptic skin cleanser with a clean, freshly washed washcloth. Gently apply to your body from chin to toes (except the genital area) and especially the area(s) where your incision(s) will be. Leave Hibiclens/Chlorhexidine on your skin for at least 20 seconds. CAUTION: If needed, Hibiclens/chlorhexidine may be used to clean the folds of skin of the legs (such as in the area of the groin) and on your buttocks and hips. However, do not use Hibiclens/Chlorhexidine above the neck or in the genital area (your bottom) or put inside any area of your body.  Turn the water back on and rinse.  Dry gently with a clean, freshly washed towel.  After your shower, do not use any powder, deodorant, perfumes or lotion prior to surgery.  Put on clean, freshly washed clothing.  Tips to help prevent infections after your surgery:  Protect your surgical wound from germs:  Hand washing is the most important thing you and your caregivers can do to prevent

## 2025-02-24 NOTE — DISCHARGE INSTRUCTIONS
CHINTAN Daley DPM [facility]   PODIATRY  Phone: [phone]        Date: 25    Patient: Milton Hughes,  Age: 54 y.o.,  Sex: male, (:1970)       POST-OPERATIVE PATIENT INFORMATION    Have your prescriptions filled and go home immediately after surgery    Go directly home and elevate feet, on the way if possible.  Do not sit with your feet down or crossed for any length of time.  This causes the feet to swell and become painful.     A limited amount of swelling is expected.  In some cases, the skin may take a bruised appearance.  This is no cause for alarm.     Elevate your feet about 6 inches above the hip level by supporting feet and legs with pillows.  Make sure to keep knees partially flexed.     Keep your bandage clean and dry.  Do not remove the bandages or inspect the wound.  A small amount of blood on the bandage is normal.      Apply an ice bag covered with a towel to the ankle for about 30 minutes on: 30  minutes off; every other hour as needed. To help with discomfort   Do not apply the ice directly to the bandage.     DO NOT CHANGE OR GET DRESSINGS WET!!  DO NOT SHOWER OR IMMERSE FOOT IN WATER   [facility] TO REAPPLY WOUND VAC AT 150MMHG TOMOORROW      Exercise your legs frequently by bending your knees and ankles to stimulate circulation.     At night make sure all bed sheets and covers are pulled loose; there should be no pressure over the foot and ankle.      Take all medications as directed.  Please do not use ASPIRIN for 7 days.    Curtail alcoholic beverages and smoking.     DO NOT WALK , STAND OR BEAR WEIGHT WITHOUT SURGICAL SHOE INTACT.  To use crutches and/or walker if indicated by your physician     CONTACT MY OFFICE IMMEDIATELY IF:    *Your bandage becomes too tight and/or your toes become numb or turn blue.    *The bandages become overly stained.    *Your medication does not control the discomfort.     *You develop a fever over 101 degrees     *Your dressing becomes wet.       Please
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
## 2025-02-25 ENCOUNTER — OFFICE VISIT (OUTPATIENT)
Age: 55
End: 2025-02-25
Payer: MEDICAID

## 2025-02-25 DIAGNOSIS — E11.59 TYPE 2 DIABETES MELLITUS WITH OTHER CIRCULATORY COMPLICATION, WITH LONG-TERM CURRENT USE OF INSULIN (HCC): Primary | ICD-10-CM

## 2025-02-25 DIAGNOSIS — Z79.4 TYPE 2 DIABETES MELLITUS WITH OTHER CIRCULATORY COMPLICATION, WITH LONG-TERM CURRENT USE OF INSULIN (HCC): Primary | ICD-10-CM

## 2025-02-25 PROCEDURE — G0108 DIAB MANAGE TRN  PER INDIV: HCPCS

## 2025-02-25 NOTE — OP NOTE
osteomyelitis remaining.    DESCRIPTION OF PROCEDURE:  The patient was brought to the operating room, placed on the operating table in supine position.  Under the influence of IV sedation, anesthesia was achieved to the right foot using 0.5% Marcaine plain.  Right foot was now prepped and draped in usual sterile manner and a successful time-out was completed.  Using a combination of rongeurs and the [employer] debridement tool, an excisional debridement was performed of the wound in through the subcu, through the fascial layer and into the muscle layer removing rolled edges, subcu, fascial and into the muscle layer.  There is moderate amount of bleeding base achieved.  Aerobic as well as anaerobic wound cultures were taken.  Pre-debridement measurements were 7 x 3 x 0.5 cm and post-debridement measurements of this wound was 7.3 x 3.5 x 1 cm.  A total of 21 square centimeters of tissue was debrided.  The wound was now flushed with copious amounts of saline and dressed with saline wet-to-dry gauze followed by Kerlix and an outer Ace.    The patient appeared to tolerate anesthesia and procedure well, was transferred from the operating room to recovery with all vitals stable.    Preoperatively, he was given 1 g vancomycin IV.    Home health is to restore the wound VAC tomorrow placed on the wound at 150 mmHg.        NASH WINTERS/DILANS  D:  02/24/2025 17:55:50  T:  02/24/2025 22:18:27  JOB #:  [id number]

## 2025-02-26 ENCOUNTER — PATIENT MESSAGE (OUTPATIENT)
Age: 55
End: 2025-02-26

## 2025-02-26 DIAGNOSIS — E11.65 UNCONTROLLED TYPE 2 DIABETES MELLITUS WITH HYPERGLYCEMIA (HCC): Primary | ICD-10-CM

## 2025-02-26 LAB
BACTERIA SPEC CULT: ABNORMAL
BACTERIA SPEC CULT: NORMAL
GRAM STN SPEC: ABNORMAL
GRAM STN SPEC: ABNORMAL
SERVICE CMNT-IMP: ABNORMAL
SERVICE CMNT-IMP: NORMAL

## 2025-02-27 RX ORDER — IBUPROFEN 200 MG
1 TABLET ORAL 3 TIMES DAILY
Qty: 100 EACH | Refills: 3 | Status: SHIPPED | OUTPATIENT
Start: 2025-02-27 | End: 2025-02-28 | Stop reason: ALTCHOICE

## 2025-02-28 ENCOUNTER — OFFICE VISIT (OUTPATIENT)
Age: 55
End: 2025-02-28
Payer: MEDICAID

## 2025-02-28 VITALS
RESPIRATION RATE: 16 BRPM | SYSTOLIC BLOOD PRESSURE: 134 MMHG | WEIGHT: 258 LBS | HEIGHT: 71 IN | TEMPERATURE: 98.3 F | BODY MASS INDEX: 36.12 KG/M2 | OXYGEN SATURATION: 97 % | HEART RATE: 87 BPM | DIASTOLIC BLOOD PRESSURE: 88 MMHG

## 2025-02-28 DIAGNOSIS — E11.59 TYPE 2 DIABETES MELLITUS WITH OTHER CIRCULATORY COMPLICATION, WITH LONG-TERM CURRENT USE OF INSULIN (HCC): Primary | Chronic | ICD-10-CM

## 2025-02-28 DIAGNOSIS — I10 PRIMARY HYPERTENSION: ICD-10-CM

## 2025-02-28 DIAGNOSIS — G89.29 CHRONIC LEFT SHOULDER PAIN: Chronic | ICD-10-CM

## 2025-02-28 DIAGNOSIS — M25.512 CHRONIC LEFT SHOULDER PAIN: Chronic | ICD-10-CM

## 2025-02-28 DIAGNOSIS — Z79.4 TYPE 2 DIABETES MELLITUS WITH OTHER CIRCULATORY COMPLICATION, WITH LONG-TERM CURRENT USE OF INSULIN (HCC): Primary | Chronic | ICD-10-CM

## 2025-02-28 LAB — HBA1C MFR BLD: 6.4 %

## 2025-02-28 PROCEDURE — 83036 HEMOGLOBIN GLYCOSYLATED A1C: CPT | Performed by: NURSE PRACTITIONER

## 2025-02-28 PROCEDURE — 99214 OFFICE O/P EST MOD 30 MIN: CPT | Performed by: NURSE PRACTITIONER

## 2025-02-28 PROCEDURE — 3075F SYST BP GE 130 - 139MM HG: CPT | Performed by: NURSE PRACTITIONER

## 2025-02-28 PROCEDURE — 3044F HG A1C LEVEL LT 7.0%: CPT | Performed by: NURSE PRACTITIONER

## 2025-02-28 PROCEDURE — 3079F DIAST BP 80-89 MM HG: CPT | Performed by: NURSE PRACTITIONER

## 2025-02-28 PROCEDURE — PBSHW AMB POC HEMOGLOBIN A1C: Performed by: NURSE PRACTITIONER

## 2025-02-28 RX ORDER — INSULIN GLARGINE 100 [IU]/ML
40 INJECTION, SOLUTION SUBCUTANEOUS NIGHTLY
Qty: 10 ML | Refills: 3 | Status: SHIPPED | OUTPATIENT
Start: 2025-02-28

## 2025-02-28 ASSESSMENT — ENCOUNTER SYMPTOMS
VOMITING: 0
SHORTNESS OF BREATH: 0
NAUSEA: 0
CHEST TIGHTNESS: 0
ABDOMINAL PAIN: 0

## 2025-02-28 NOTE — PROGRESS NOTES
Chief Complaint   Patient presents with    Follow-up         Health Maintenance Due   Topic Date Due    HIV screen  Never done    Diabetic Alb to Cr ratio (uACR) test  Never done    Diabetic retinal exam  Never done    Hepatitis C screen  Never done    Hepatitis B vaccine (1 of 3 - 19+ 3-dose series) Never done    Pneumococcal 50+ years Vaccine (1 of 2 - PCV) Never done    Colorectal Cancer Screen  Never done    Shingles vaccine (1 of 2) Never done    DTaP/Tdap/Td vaccine (1 - Tdap) 03/27/2024    Flu vaccine (1) 08/01/2024    A1C test (Diabetic or Prediabetic)  02/28/2025         \"Have you been to the ER, urgent care clinic since your last visit?  Hospitalized since your last visit?\"    yes    “Have you seen or consulted any other health care providers outside of [facility] since your last visit?”    Wound care, Foot Dr, Plastic surgery    “Have you had a colorectal cancer screening such as a colonoscopy/FIT/Cologuard?    NO    No colonoscopy on file  No cologuard on file  No FIT/FOBT on file   No flexible sigmoidoscopy on file

## 2025-02-28 NOTE — PROGRESS NOTES
[facility]  Diabetes Self-Management Education & Support Program  Encounter Note      SUMMARY  Diabetes self-care management training was completed related to taking medications and physical activity. he participant will return on March 18 to continue DSMES related to monitoring and healthy coping. The participant did identify SMART Goal(s) and will practice knowledge and skills related to reducing risks, healthy eating, being active, and medications to improve diabetes self-management.        EVALUATION:  Mr Hughes expressed understanding of the role medications play in diabetes management and the expected action and side effects of his prescribed diabetes medications, Lantus & Novolog insulins & Glipizide. He does not miss doses. We reviewed the onset, peak & duration of both insulins, priming of pen, site selection, safe disposal of sharps & the rule of 15 to treat hypoglycemia. Everette is using CGM to monitor BG with a recent time in range of 50%, 42% high & 8% very high. He had a recent debridement of his foot wound.    Everette expressed understanding of the benefits of exercise including lowering BG, BP, LDL cholesterol & stress reduction. We performed a 6 minute resistance chair exercise during today's session.     RECOMMENDATIONS:  Discuss insulin dosing with provider at next appt.  Obtain glucose tablets to have available.  Increase chair exercise.  Continue DSMES.     TOPICS DISCUSSED TODAY:  HOW DO MY DIABETES MEDICATIONS WORK? 30  HOW DOES PHYSICAL ACTIVITY AFFECT MY DIABETES? 30      Next provider visit is scheduled for this week       SMART GOAL(S)   Obtain glucose tablets  ( goal set 2/25/25)  ACHIEVEMENT OF GOAL(S) : 0-24%       DATE DSMES TOPIC EVALUATION     2/28/2025 HOW DO MY DIABETES MEDICATIONS WORK?   Type 1 medications & methods   Insulin injections   Injection sites   Type 2 medications   Oral agents   GLP-1 agonists   Hypoglycemia symptoms & treatment   Glucagon

## 2025-02-28 NOTE — PROGRESS NOTES
Milton Hughes (:  1970) is a 54 y.o. male,Established patient, here for evaluation of the following chief complaint(s):         1. Type 2 diabetes mellitus with other circulatory complication, with long-term current use of insulin ([facility])  Comments:  uncontrolled  increase glargine to 32 units   keep s/s as is  f/u on MyChart  Orders:  -     AMB POC HEMOGLOBIN A1C  -     insulin glargine (LANTUS) 100 UNIT/ML injection vial; Inject 40 Units into the skin nightly, Disp-10 mL, R-3Normal  -     Insulin Pen Needle 31G X 6 MM MISC; DAILY Starting 2025, Disp-100 each, R-3, Normal  -     [facility] - Stefan Cotter MD, Endocrinology, [place]  -     Insulin Syringe-Needle U-100 31G X 15/64\" 1 ML MISC; 4 times daily Starting 2025, Disp-100 each, R-3, Normal  2. Chronic left shoulder pain  Comments:  uncontrolled  pt would like to get some assistance  Orders:  -     [facility] - Charbel Franz MD, Orthopedic Surgery (elbow, hand, wrist), [place]  3. Primary hypertension  Comments:  stable  stopped ACE  cont to monitor       Return in about 3 months (around 2025) for MD visit, Labs.       Subjective     HPI    Patient returns to the clinic for a follow-up of his diabetes.  Great news, his hemoglobin A1c has decreased drastically down to 6.4.  He is following with nursing education for diabetes classes.  Denies any crazy lows or highs.  He is using a continuous CGM.  We are going to increase his Lantus from 30-40 and monitor his insulin aspart mealtime insulin to see if we can potentially decrease it.  I have also placed a referral to endocrinology for him.    He has been having chronic left shoulder pain follow-up with orthopedics for treatment and or an opinion.  I have placed that referral for him as well.    Of note patient does not like to take medications and he definitely does not like to take pain medication.    In regards to his right lower extremity he still has the wound VAC

## 2025-03-07 ENCOUNTER — HOSPITAL ENCOUNTER (OUTPATIENT)
Facility: HOSPITAL | Age: 55
Discharge: HOME | End: 2025-03-07
Attending: SURGERY
Payer: COMMERCIAL

## 2025-03-07 VITALS — SYSTOLIC BLOOD PRESSURE: 126 MMHG | TEMPERATURE: 98.1 F | DIASTOLIC BLOOD PRESSURE: 82 MMHG | HEART RATE: 79 BPM

## 2025-03-07 DIAGNOSIS — L97.414 DIABETIC ULCER OF RIGHT MIDFOOT ASSOCIATED WITH TYPE 2 DIABETES MELLITUS, WITH NECROSIS OF BONE (HCC): Primary | ICD-10-CM

## 2025-03-07 DIAGNOSIS — E11.621 DIABETIC ULCER OF RIGHT MIDFOOT ASSOCIATED WITH TYPE 2 DIABETES MELLITUS, WITH NECROSIS OF BONE (HCC): Primary | ICD-10-CM

## 2025-03-07 PROCEDURE — 97605 NEG PRS WND THER DME<=50SQCM: CPT

## 2025-03-07 RX ORDER — LIDOCAINE 50 MG/G
OINTMENT TOPICAL ONCE
OUTPATIENT
Start: 2025-03-07 | End: 2025-03-07

## 2025-03-07 RX ORDER — SODIUM CHLOR/HYPOCHLOROUS ACID 0.033 %
SOLUTION, IRRIGATION IRRIGATION ONCE
OUTPATIENT
Start: 2025-03-07 | End: 2025-03-07

## 2025-03-07 RX ORDER — BETAMETHASONE DIPROPIONATE 0.5 MG/G
CREAM TOPICAL ONCE
OUTPATIENT
Start: 2025-03-07 | End: 2025-03-07

## 2025-03-07 RX ORDER — GINSENG 100 MG
CAPSULE ORAL ONCE
OUTPATIENT
Start: 2025-03-07 | End: 2025-03-07

## 2025-03-07 RX ORDER — LIDOCAINE HYDROCHLORIDE 40 MG/ML
SOLUTION TOPICAL ONCE
OUTPATIENT
Start: 2025-03-07 | End: 2025-03-07

## 2025-03-07 RX ORDER — LIDOCAINE HYDROCHLORIDE 20 MG/ML
JELLY TOPICAL ONCE
OUTPATIENT
Start: 2025-03-07 | End: 2025-03-07

## 2025-03-07 RX ORDER — MUPIROCIN 20 MG/G
OINTMENT TOPICAL ONCE
OUTPATIENT
Start: 2025-03-07 | End: 2025-03-07

## 2025-03-07 RX ORDER — LIDOCAINE 40 MG/G
CREAM TOPICAL ONCE
OUTPATIENT
Start: 2025-03-07 | End: 2025-03-07

## 2025-03-07 RX ORDER — GENTAMICIN SULFATE 1 MG/G
OINTMENT TOPICAL ONCE
OUTPATIENT
Start: 2025-03-07 | End: 2025-03-07

## 2025-03-07 RX ORDER — CLOBETASOL PROPIONATE 0.5 MG/G
OINTMENT TOPICAL ONCE
OUTPATIENT
Start: 2025-03-07 | End: 2025-03-07

## 2025-03-07 RX ORDER — SILVER SULFADIAZINE 10 MG/G
CREAM TOPICAL ONCE
OUTPATIENT
Start: 2025-03-07 | End: 2025-03-07

## 2025-03-07 RX ORDER — TRIAMCINOLONE ACETONIDE 1 MG/G
OINTMENT TOPICAL ONCE
OUTPATIENT
Start: 2025-03-07 | End: 2025-03-07

## 2025-03-07 RX ORDER — BACITRACIN ZINC AND POLYMYXIN B SULFATE 500; 1000 [USP'U]/G; [USP'U]/G
OINTMENT TOPICAL ONCE
OUTPATIENT
Start: 2025-03-07 | End: 2025-03-07

## 2025-03-07 RX ORDER — NEOMYCIN/BACITRACIN/POLYMYXINB 3.5-400-5K
OINTMENT (GRAM) TOPICAL ONCE
OUTPATIENT
Start: 2025-03-07 | End: 2025-03-07

## 2025-03-07 ASSESSMENT — PAIN DESCRIPTION - LOCATION: LOCATION: FOOT

## 2025-03-07 ASSESSMENT — PAIN DESCRIPTION - ORIENTATION: ORIENTATION: RIGHT

## 2025-03-07 ASSESSMENT — PAIN SCALES - GENERAL: PAINLEVEL_OUTOF10: 6

## 2025-03-07 NOTE — FLOWSHEET NOTE
03/07/25 1345   RLE Neurovascular Assessment   Capillary Refill Less than/Equal to 3 seconds   Color Appropriate for Ethnicity   Temperature Warm   R Pedal Pulse +2   Wound 12/30/24 Foot Right # 1   Date First Assessed/Time First Assessed: 12/30/24 0935   Present on Original Admission: Yes  Wound Approximate Age at First Assessment (Weeks): 4 weeks  Primary Wound Type: Diabetic Ulcer  Location: Foot  Wound Location Orientation: Right  Wound Descr...   Wound Image    Wound Etiology Diabetic   Dressing Status Old drainage noted   Wound Cleansed Soap and water   Offloading for Diabetic Foot Ulcers Post op shoe   Wound Length (cm) 5 cm   Wound Width (cm) 4.9 cm   Wound Depth (cm) 0.3 cm   Wound Surface Area (cm^2) 24.5 cm^2   Change in Wound Size % (l*w) 57.47   Wound Volume (cm^3) 7.35 cm^3   Wound Healing % 79   Wound Assessment [place]/red;Slough   Drainage Amount Moderate (25-50%)   Drainage Description Serosanguinous   Odor None   Haily-wound Assessment Maceration   Margins Defined edges   Wound Thickness Description not for Pressure Injury Full thickness   Pain Assessment   Pain Assessment 0-10   Pain Level 6   Pain Location Foot   Pain Orientation Right     /82   Pulse 79   Temp 98.1 °F (36.7 °C) (Temporal)

## 2025-03-07 NOTE — PATIENT INSTRUCTIONS
Discharge Instructions for  [facility]  [address]  [address]  [place] [address]  Phone: [phone] Fax: [phone]    NAME:  Milton Hughes  YOB: 1970  MEDICAL RECORD NUMBER:  [id number]  DATE:  March 7, 2025    Home health - [facility]    WOUND CARE ORDERS:  Right foot wound - Cleanse with mild soap and water then pat dry. Apply hydrogel and adaptic to wound. Apply wound vac to wound as listed below.     Negative Pressure:  Wound Location: right foot    Pressure @   125mmHg   [ X] continuous   [ X]black foam   Change Dressing: [X ] Two times per week     Continue your antibiotic.  We are sending insurance a request for skin substitute.    Activity:  [] Elevate leg(s) above the level of the heart when sitting.  [] Avoid prolonged standing in one place.   [] Do no get dressing/wrap wet.       Dietary:  [] Diet as tolerated      [] Diabetic Diet            [] Increase Protein: examples (Meat, cheese, eggs, greek yogurt, fish, nuts)          [] Alin Therapeutic Nutrition Powder  [] Other:        Return Appointment:  [x] Return Appointment: With Dr. Lay Daley in 3 Week(s)  [] Nurse Visit :   [] Ordered tests:      Electronically signed Carmen Rust RN on 3/7/2025 at 9:28 AM     Wound Care Center Information: Should you experience any significant changes in your wound(s) or have questions about your wound care, please contact the [facility] at MONDAY - FRIDAY 8:00 am - 4:30.  If you need help with your wound outside these hours and cannot wait until we are again available, contact your PCP or go to the hospital emergency room.     PLEASE NOTE: IF YOU ARE UNABLE TO OBTAIN WOUND SUPPLIES, CONTINUE TO USE THE SUPPLIES YOU HAVE AVAILABLE UNTIL YOU ARE ABLE TO REACH US. IT IS MOST IMPORTANT TO KEEP THE WOUND COVERED AT ALL TIMES.     Physician Signature:_______________________    Date: ___________ Time:  ____________

## 2025-03-07 NOTE — FLOWSHEET NOTE
03/07/25 1511   Wound 12/30/24 Foot Right # 1   Date First Assessed/Time First Assessed: 12/30/24 0935   Present on Original Admission: Yes  Wound Approximate Age at First Assessment (Weeks): 4 weeks  Primary Wound Type: Diabetic Ulcer  Location: Foot  Wound Location Orientation: Right  Wound Descr...   Dressing Status New dressing applied   Dressing/Treatment   (Adpatic, Hydrogel, 1 black foam and wound vac @ 125 mmHg, Ace wrao)     Negative Pressure    NAME:  Milton Hughes  YOB: 1970  MEDICAL RECORD NUMBER:  [id number]  DATE:  3/7/2025    Applied Negative Pressure to Right foot wound(s)/ulcer(s).  [x] Applied skin barrier prep to rancho-wound.   [x] Cut strips of plastic drape to picture frame wound so that rancho-wound is     covered with the drape.   [x] If bridging dressing to less prominent site, cover any intact skin that will come in contact with the Negative Pressure Therapy sponge, gauze or channel drain with plastic drape.  The sponge should never touch intact skin.   [x] Cut sponge, gauze or channel drain to size which will fit into the wound/ulcer bed without being forced.   [x] Be sure the sponge is large enough to hold the entire round plastic flange which is attached to the tubing.  Never allow flange to be larger than the sponge or it will produce suction damaging intact skin.  Total number of individual pieces of foam used within the wound bed: 1  [x] If bridging the dressing away from the primary site, be sure the bridge leads to a piece of sponge large enough to hold the entire flange without allowing any of the flange to overlap onto intact skin.   [x] Covered sponge, gauze or channel drain with plastic drape.   [x] Cut a hole in this plastic drape directly over the sponge the same size as the plastic drain tubing.   [x] Removed plastic liner from flange and apply it directly over the hole you cut.   [x] Removed the plastic cover from the flange.   [x] Attached the tubing to the

## 2025-03-07 NOTE — WOUND CARE
instructions given to patient or POA.         Electronically signed by Lay Daley DPM on 3/7/2025 at 3:38 PM

## 2025-03-18 ENCOUNTER — OFFICE VISIT (OUTPATIENT)
Age: 55
End: 2025-03-18
Payer: COMMERCIAL

## 2025-03-18 DIAGNOSIS — Z79.4 TYPE 2 DIABETES MELLITUS WITH OTHER CIRCULATORY COMPLICATION, WITH LONG-TERM CURRENT USE OF INSULIN: Primary | ICD-10-CM

## 2025-03-18 DIAGNOSIS — E11.59 TYPE 2 DIABETES MELLITUS WITH OTHER CIRCULATORY COMPLICATION, WITH LONG-TERM CURRENT USE OF INSULIN: Primary | ICD-10-CM

## 2025-03-18 PROCEDURE — G0108 DIAB MANAGE TRN  PER INDIV: HCPCS

## 2025-03-18 RX ORDER — KETOROLAC TROMETHAMINE 30 MG/ML
INJECTION, SOLUTION INTRAMUSCULAR; INTRAVENOUS
Qty: 1 EACH | Status: SHIPPED | OUTPATIENT
Start: 2025-03-18

## 2025-03-18 RX ORDER — HYDROCHLOROTHIAZIDE 12.5 MG/1
CAPSULE ORAL
Qty: 1 EACH | Refills: 5 | Status: SHIPPED | OUTPATIENT
Start: 2025-03-18

## 2025-03-18 NOTE — PROGRESS NOTES
[facility]  Diabetes Self-Management Education & Support Program  Encounter Note      SUMMARY  Diabetes self-care management training was completed related to monitoring, healthy coping, and problem solving. The participant will return on April 29 for post program evaluation. The participantdid not identify SMART Goal(s) and will practice knowledge and skills related to reducing risks, healthy eating and monitoring, being active and medications, and healthy coping and problem solving to improve diabetes self-management.        EVALUATION:  Mr Hughes shared he feels supported in his diabetes self management by family and providers. He spends time with his Dad, watches baseball & works on cars to reduce stress. He plans to use ADA website as part of his DM self management plan going forward.  Everette expressed understanding of the important role BG monitoring plays in diabetes management including impact of food type & amount on BG & the impact of medications, exercise & illness. He is using CGM to monitor BG with a recent time in range of 67%.  Everette expressed understanding of using problem solving strategies to overcome barriers to DM self management.    RECOMMENDATIONS:  Increase physical activity.     TOPICS DISCUSSED TODAY:  HOW DO I FIND SUPPORT TO TACKLE THIS CONDITION? 30  HOW DO I FIGURE OUT WHAT'S INFLUENCING MY BLOOD GLUCOSES? 30  How does BG monitoring help?    Next provider visit is scheduled for 10/31/25       SMART GOAL(S)   Obtain glucose tablets  ACHIEVEMENT OF GOAL(S) : %       DATE DSMES TOPIC EVALUATION     3/18/2025 HOW CAN BLOOD GLUCOSE MONITORING HELP ME?   Value of blood glucose monitoring   Realistic expectations   Blood glucose monitoring targets   Target adjustments   Setting a1c & blood glucose targets with provider   Meter selection    Technique for obtaining blood droplet   Blood glucose testing sites   Determining best times to test   Pregnancy

## 2025-03-25 ENCOUNTER — TELEPHONE (OUTPATIENT)
Age: 55
End: 2025-03-25

## 2025-03-26 ENCOUNTER — TELEPHONE (OUTPATIENT)
Age: 55
End: 2025-03-26

## 2025-03-26 NOTE — TELEPHONE ENCOUNTER
Re: FreeStyle Joel 3    [employer] denied PA request - it is not on their preferred list.  Letter states \"Must try one preferred device first. The preferred device is Dexcom.\"     Letter uploaded to Media and sent to provider for review.

## 2025-03-26 NOTE — TELEPHONE ENCOUNTER
Cgm  Sensor Freestyle Joel 3 Plus Sensor   Sent through CMM to [employer] for [employer]  of [place] sent via CMM with notes     Continuous Glucose  (FREESTYLE JOEL 3 READER) DEVICE sent via CMM Key [id number] PA Case ID #: [id number]  sent PCP office visit 2-28-25 and Diabetes Ed office visit 3-18-25.    [employer] has not yet replied to your PA request. Depending on the information you've provided, additional questions may be returned by the plan. You may close this dialog, return to your dashboard, and perform other tasks.  To check for an update later, open this request again from your dashboard.  If [employer] has not replied to your request within 24 hours pl-ease contact [employer] at [phone].-

## 2025-03-28 ENCOUNTER — OFFICE VISIT (OUTPATIENT)
Age: 55
End: 2025-03-28
Payer: COMMERCIAL

## 2025-03-28 ENCOUNTER — HOSPITAL ENCOUNTER (OUTPATIENT)
Facility: HOSPITAL | Age: 55
Discharge: HOME | End: 2025-03-28
Attending: SURGERY
Payer: COMMERCIAL

## 2025-03-28 VITALS
RESPIRATION RATE: 16 BRPM | WEIGHT: 257 LBS | DIASTOLIC BLOOD PRESSURE: 82 MMHG | TEMPERATURE: 98.2 F | SYSTOLIC BLOOD PRESSURE: 136 MMHG | HEART RATE: 84 BPM | HEIGHT: 71 IN | OXYGEN SATURATION: 100 % | BODY MASS INDEX: 35.98 KG/M2

## 2025-03-28 VITALS
TEMPERATURE: 98.1 F | HEART RATE: 68 BPM | RESPIRATION RATE: 16 BRPM | DIASTOLIC BLOOD PRESSURE: 84 MMHG | SYSTOLIC BLOOD PRESSURE: 157 MMHG

## 2025-03-28 DIAGNOSIS — L97.414 DIABETIC ULCER OF RIGHT MIDFOOT ASSOCIATED WITH TYPE 2 DIABETES MELLITUS, WITH NECROSIS OF BONE: Primary | ICD-10-CM

## 2025-03-28 DIAGNOSIS — E11.621 DIABETIC ULCER OF RIGHT MIDFOOT ASSOCIATED WITH TYPE 2 DIABETES MELLITUS, WITH NECROSIS OF BONE: Primary | ICD-10-CM

## 2025-03-28 DIAGNOSIS — H61.23 CERUMEN DEBRIS ON TYMPANIC MEMBRANE, BILATERAL: Primary | ICD-10-CM

## 2025-03-28 DIAGNOSIS — E11.52 TYPE 2 DIABETES MELLITUS WITH DIABETIC PERIPHERAL ANGIOPATHY AND GANGRENE, WITHOUT LONG-TERM CURRENT USE OF INSULIN (HCC): Chronic | ICD-10-CM

## 2025-03-28 PROBLEM — I96 GANGRENE OF RIGHT FOOT (HCC): Status: RESOLVED | Noted: 2024-12-04 | Resolved: 2025-03-28

## 2025-03-28 PROBLEM — L97.519 RIGHT FOOT ULCER, WITH UNSPECIFIED SEVERITY (HCC): Status: RESOLVED | Noted: 2024-11-29 | Resolved: 2025-03-28

## 2025-03-28 PROBLEM — E11.65 UNCONTROLLED TYPE 2 DIABETES MELLITUS WITH HYPERGLYCEMIA (HCC): Status: RESOLVED | Noted: 2024-12-03 | Resolved: 2025-03-28

## 2025-03-28 PROCEDURE — 11043 DBRDMT MUSC&/FSCA 1ST 20/<: CPT

## 2025-03-28 PROCEDURE — 99213 OFFICE O/P EST LOW 20 MIN: CPT | Performed by: NURSE PRACTITIONER

## 2025-03-28 PROCEDURE — 69210 REMOVE IMPACTED EAR WAX UNI: CPT | Performed by: NURSE PRACTITIONER

## 2025-03-28 RX ORDER — TRIAMCINOLONE ACETONIDE 1 MG/G
OINTMENT TOPICAL ONCE
OUTPATIENT
Start: 2025-03-28 | End: 2025-03-28

## 2025-03-28 RX ORDER — CETIRIZINE HYDROCHLORIDE 10 MG/1
10 TABLET ORAL DAILY
Qty: 30 TABLET | Refills: 0 | Status: SHIPPED | OUTPATIENT
Start: 2025-03-28

## 2025-03-28 RX ORDER — MUPIROCIN 20 MG/G
OINTMENT TOPICAL ONCE
OUTPATIENT
Start: 2025-03-28 | End: 2025-03-28

## 2025-03-28 RX ORDER — LIDOCAINE HYDROCHLORIDE 20 MG/ML
JELLY TOPICAL ONCE
OUTPATIENT
Start: 2025-03-28 | End: 2025-03-28

## 2025-03-28 RX ORDER — LIDOCAINE HYDROCHLORIDE 40 MG/ML
SOLUTION TOPICAL ONCE
OUTPATIENT
Start: 2025-03-28 | End: 2025-03-28

## 2025-03-28 RX ORDER — GENTAMICIN SULFATE 1 MG/G
OINTMENT TOPICAL ONCE
OUTPATIENT
Start: 2025-03-28 | End: 2025-03-28

## 2025-03-28 RX ORDER — SILVER SULFADIAZINE 10 MG/G
CREAM TOPICAL ONCE
OUTPATIENT
Start: 2025-03-28 | End: 2025-03-28

## 2025-03-28 RX ORDER — LIDOCAINE 40 MG/G
CREAM TOPICAL ONCE
OUTPATIENT
Start: 2025-03-28 | End: 2025-03-28

## 2025-03-28 RX ORDER — LIDOCAINE 50 MG/G
OINTMENT TOPICAL ONCE
OUTPATIENT
Start: 2025-03-28 | End: 2025-03-28

## 2025-03-28 RX ORDER — CLOBETASOL PROPIONATE 0.5 MG/G
OINTMENT TOPICAL ONCE
OUTPATIENT
Start: 2025-03-28 | End: 2025-03-28

## 2025-03-28 RX ORDER — GINSENG 100 MG
CAPSULE ORAL ONCE
OUTPATIENT
Start: 2025-03-28 | End: 2025-03-28

## 2025-03-28 RX ORDER — BACITRACIN ZINC AND POLYMYXIN B SULFATE 500; 1000 [USP'U]/G; [USP'U]/G
OINTMENT TOPICAL ONCE
OUTPATIENT
Start: 2025-03-28 | End: 2025-03-28

## 2025-03-28 RX ORDER — NEOMYCIN/BACITRACIN/POLYMYXINB 3.5-400-5K
OINTMENT (GRAM) TOPICAL ONCE
OUTPATIENT
Start: 2025-03-28 | End: 2025-03-28

## 2025-03-28 RX ORDER — SODIUM CHLOR/HYPOCHLOROUS ACID 0.033 %
SOLUTION, IRRIGATION IRRIGATION ONCE
OUTPATIENT
Start: 2025-03-28 | End: 2025-03-28

## 2025-03-28 RX ORDER — BETAMETHASONE DIPROPIONATE 0.5 MG/G
CREAM TOPICAL ONCE
OUTPATIENT
Start: 2025-03-28 | End: 2025-03-28

## 2025-03-28 ASSESSMENT — ENCOUNTER SYMPTOMS
ABDOMINAL PAIN: 0
SINUS PRESSURE: 0
SINUS PAIN: 0
SHORTNESS OF BREATH: 0

## 2025-03-28 NOTE — PATIENT INSTRUCTIONS
Discharge Instructions for  [facility]  [address]  [address]  [place] [address]  Phone: [phone] Fax: [phone]    NAME:  Milton Hughes  YOB: 1970  MEDICAL RECORD NUMBER:  [id number]  DATE:  March 28, 2025    Home health - [facility]    WOUND CARE ORDERS:  Right foot wound - Cleanse with mild soap and water then pat dry. Apply collagen with silver to wound. Cover with border foam. Change dressing 3 times per week.  Home health to continue SN for wound care.    Discontinue the wound vac    Activity:  [] Elevate leg(s) above the level of the heart when sitting.  [] Avoid prolonged standing in one place.   [] Do no get dressing/wrap wet.       Dietary:  [] Diet as tolerated      [] Diabetic Diet            [] Increase Protein: examples (Meat, cheese, eggs, greek yogurt, fish, nuts)          [] Alin Therapeutic Nutrition Powder  [] Other:        Return Appointment:  [x] Return Appointment: With Dr. Lay Daley in 3 Week(s)  [] Nurse Visit :   [] Ordered tests:      Electronically signed Carmen Rust RN on 3/28/2025 at 10:44 AM     Wound Care Center Information: Should you experience any significant changes in your wound(s) or have questions about your wound care, please contact the [facility] at MONDAY - FRIDAY 8:00 am - 4:30.  If you need help with your wound outside these hours and cannot wait until we are again available, contact your PCP or go to the hospital emergency room.     PLEASE NOTE: IF YOU ARE UNABLE TO OBTAIN WOUND SUPPLIES, CONTINUE TO USE THE SUPPLIES YOU HAVE AVAILABLE UNTIL YOU ARE ABLE TO REACH US. IT IS MOST IMPORTANT TO KEEP THE WOUND COVERED AT ALL TIMES.     Physician Signature:_______________________    Date: ___________ Time:  ____________

## 2025-03-28 NOTE — WOUND CARE
Debridement Wound Care        Problem List Items Addressed This Visit          Endocrine    Diabetic ulcer of right midfoot associated with type 2 diabetes mellitus, with necrosis of bone (HCC) - Primary    Relevant Orders    Initiate Outpatient Wound Care Protocol       Procedure Note  Indications:  Based on my examination of this patient's wound(s)/ulcer(s) today, debridement is  required to promote healing and evaluate the wound base.    Performed by: Lay Daley DPM    Consent obtained: Yes    Time out taken: Yes    Debridement: excisional    Using curette the wound(s)/ulcer(s) was/were sharply debrided down through and including the removal of    muscle/fascia    Devitalized Tissue Debrided: fibrin, slough, and necrotic/eschar    Pre Debridement Measurements:  Are located in the Wound/Ulcer Documentation Flow Sheet    Diabetic ulcer, fat layer exposed    Wound/Ulcer #: 1    Post Debridement Measurements:  Wound/Ulcer Descriptions are Pre Debridement except measurements:    Wound Care Documentation:  Negative Pressure Wound Therapy Foot Right;Dorsal;Lateral (Active)   Dressing Status Intact w/seal 02/07/25 1328   Canister changed? No 02/07/25 1328   Output (ml) 10 ml 01/02/25 0812   Unit Type activac 01/10/25 1032   Mode Continuous 02/07/25 1328   Target Pressure (mmHg) 125 02/07/25 1328   Intensity Low 12/30/24 0930   $$ Dressing Changed & Charged $ Standard NPWT less than or equal to 50 sq cm PER TX 01/30/25 1316   Number of pieces placed 1 01/13/25 0855   Dressing Type Non-adherent contact layer 01/30/25 1316   Dressing Change Due 01/17/25 01/13/25 0855   Number of days: 109       Wound 12/30/24 Foot Right # 1 (Active)   Wound Image   03/28/25 1426   Wound Etiology Diabetic 03/28/25 1512   Dressing Status New dressing applied 03/28/25 1512   Wound Cleansed Cleansed with saline 03/28/25 1512   Dressing/Treatment Collagen with Ag;Foam;Silicone border 03/28/25 1512   Offloading for Diabetic Foot Ulcers

## 2025-03-28 NOTE — FLOWSHEET NOTE
03/28/25 1426   RLE Neurovascular Assessment   Capillary Refill Less than/Equal to 3 seconds   Color Appropriate for Ethnicity   Temperature Warm   R Pedal Pulse +2   Wound 12/30/24 Foot Right # 1   Date First Assessed/Time First Assessed: 12/30/24 0935   Present on Original Admission: Yes  Wound Approximate Age at First Assessment (Weeks): 4 weeks  Primary Wound Type: Diabetic Ulcer  Location: Foot  Wound Location Orientation: Right  Wound Descr...   Wound Image    Wound Etiology Diabetic   Dressing Status Old drainage noted   Wound Cleansed Soap and water   Offloading for Diabetic Foot Ulcers Post op shoe   Wound Length (cm) 4.6 cm   Wound Width (cm) 1.6 cm   Wound Depth (cm) 0.1 cm   Wound Surface Area (cm^2) 7.36 cm^2   Change in Wound Size % (l*w) 87.22   Wound Volume (cm^3) 0.736 cm^3   Wound Healing % 98   Wound Assessment Granulation tissue;Pink/red   Drainage Amount Moderate (25-50%)   Drainage Description Serosanguinous   Odor None   Haily-wound Assessment Fragile   Margins Defined edges   Wound Thickness Description not for Pressure Injury Full thickness     BP (!) 157/84   Pulse 68   Temp 98.1 °F (36.7 °C) (Temporal)   Resp 16

## 2025-03-28 NOTE — PROGRESS NOTES
Chief Complaint   Patient presents with    Ear Fullness         Health Maintenance Due   Topic Date Due    HIV screen  Never done    Diabetic Alb to Cr ratio (uACR) test  Never done    Diabetic retinal exam  Never done    Hepatitis C screen  Never done    Hepatitis B vaccine (1 of 3 - 19+ 3-dose series) Never done    Pneumococcal 50+ years Vaccine (1 of 2 - PCV) Never done    Colorectal Cancer Screen  Never done    Shingles vaccine (1 of 2) Never done    DTaP/Tdap/Td vaccine (1 - Tdap) 03/27/2024    Flu vaccine (1) 08/01/2024         \"Have you been to the ER, urgent care clinic since your last visit?  Hospitalized since your last visit?\"    NO    “Have you seen or consulted any other health care providers outside of [facility] since your last visit?”    Wound care    “Have you had a colorectal cancer screening such as a colonoscopy/FIT/Cologuard?    NO    No colonoscopy on file  No cologuard on file  No FIT/FOBT on file   No flexible sigmoidoscopy on file
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
## 2025-04-02 RX ORDER — ACYCLOVIR 400 MG/1
TABLET ORAL
Qty: 1 EACH | Refills: 0 | Status: SHIPPED | OUTPATIENT
Start: 2025-04-02

## 2025-04-02 RX ORDER — ACYCLOVIR 400 MG/1
TABLET ORAL
Qty: 4 EACH | Refills: 11 | Status: SHIPPED | OUTPATIENT
Start: 2025-04-02

## 2025-04-05 DIAGNOSIS — G54.6 PHANTOM PAIN AFTER AMPUTATION OF LOWER EXTREMITY: ICD-10-CM

## 2025-04-07 RX ORDER — PREGABALIN 100 MG/1
100 CAPSULE ORAL 3 TIMES DAILY
Qty: 90 CAPSULE | Refills: 0 | Status: SHIPPED | OUTPATIENT
Start: 2025-04-07 | End: 2025-05-07

## 2025-04-07 RX ORDER — CETIRIZINE HYDROCHLORIDE 10 MG/1
10 TABLET ORAL DAILY
Qty: 30 TABLET | Refills: 0 | Status: SHIPPED | OUTPATIENT
Start: 2025-04-07

## 2025-04-17 ENCOUNTER — PATIENT MESSAGE (OUTPATIENT)
Age: 55
End: 2025-04-17

## 2025-04-17 DIAGNOSIS — L97.414 DIABETIC ULCER OF RIGHT MIDFOOT ASSOCIATED WITH TYPE 2 DIABETES MELLITUS, WITH NECROSIS OF BONE (HCC): ICD-10-CM

## 2025-04-17 DIAGNOSIS — E11.621 DIABETIC ULCER OF RIGHT MIDFOOT ASSOCIATED WITH TYPE 2 DIABETES MELLITUS, WITH NECROSIS OF BONE (HCC): ICD-10-CM

## 2025-04-18 ENCOUNTER — HOSPITAL ENCOUNTER (OUTPATIENT)
Facility: HOSPITAL | Age: 55
Discharge: HOME | End: 2025-04-18
Attending: SURGERY
Payer: COMMERCIAL

## 2025-04-18 VITALS
HEART RATE: 95 BPM | DIASTOLIC BLOOD PRESSURE: 72 MMHG | TEMPERATURE: 97.6 F | SYSTOLIC BLOOD PRESSURE: 130 MMHG | RESPIRATION RATE: 16 BRPM

## 2025-04-18 DIAGNOSIS — L97.414 DIABETIC ULCER OF RIGHT MIDFOOT ASSOCIATED WITH TYPE 2 DIABETES MELLITUS, WITH NECROSIS OF BONE: Primary | ICD-10-CM

## 2025-04-18 DIAGNOSIS — E11.621 DIABETIC ULCER OF RIGHT MIDFOOT ASSOCIATED WITH TYPE 2 DIABETES MELLITUS, WITH NECROSIS OF BONE: Primary | ICD-10-CM

## 2025-04-18 PROCEDURE — 11043 DBRDMT MUSC&/FSCA 1ST 20/<: CPT

## 2025-04-18 RX ORDER — CLOBETASOL PROPIONATE 0.5 MG/G
OINTMENT TOPICAL ONCE
OUTPATIENT
Start: 2025-04-18 | End: 2025-04-18

## 2025-04-18 RX ORDER — GINSENG 100 MG
CAPSULE ORAL ONCE
OUTPATIENT
Start: 2025-04-18 | End: 2025-04-18

## 2025-04-18 RX ORDER — MUPIROCIN 20 MG/G
OINTMENT TOPICAL ONCE
OUTPATIENT
Start: 2025-04-18 | End: 2025-04-18

## 2025-04-18 RX ORDER — BACITRACIN ZINC AND POLYMYXIN B SULFATE 500; 1000 [USP'U]/G; [USP'U]/G
OINTMENT TOPICAL ONCE
OUTPATIENT
Start: 2025-04-18 | End: 2025-04-18

## 2025-04-18 RX ORDER — GENTAMICIN SULFATE 1 MG/G
OINTMENT TOPICAL ONCE
OUTPATIENT
Start: 2025-04-18 | End: 2025-04-18

## 2025-04-18 RX ORDER — LIDOCAINE 50 MG/G
OINTMENT TOPICAL ONCE
OUTPATIENT
Start: 2025-04-18 | End: 2025-04-18

## 2025-04-18 RX ORDER — BETAMETHASONE DIPROPIONATE 0.5 MG/G
CREAM TOPICAL ONCE
OUTPATIENT
Start: 2025-04-18 | End: 2025-04-18

## 2025-04-18 RX ORDER — SILVER SULFADIAZINE 10 MG/G
CREAM TOPICAL ONCE
OUTPATIENT
Start: 2025-04-18 | End: 2025-04-18

## 2025-04-18 RX ORDER — LIDOCAINE HYDROCHLORIDE 20 MG/ML
JELLY TOPICAL ONCE
OUTPATIENT
Start: 2025-04-18 | End: 2025-04-18

## 2025-04-18 RX ORDER — TRIAMCINOLONE ACETONIDE 1 MG/G
OINTMENT TOPICAL ONCE
OUTPATIENT
Start: 2025-04-18 | End: 2025-04-18

## 2025-04-18 RX ORDER — LIDOCAINE HYDROCHLORIDE 40 MG/ML
SOLUTION TOPICAL ONCE
OUTPATIENT
Start: 2025-04-18 | End: 2025-04-18

## 2025-04-18 RX ORDER — SODIUM CHLOR/HYPOCHLOROUS ACID 0.033 %
SOLUTION, IRRIGATION IRRIGATION ONCE
OUTPATIENT
Start: 2025-04-18 | End: 2025-04-18

## 2025-04-18 RX ORDER — LIDOCAINE 40 MG/G
CREAM TOPICAL ONCE
OUTPATIENT
Start: 2025-04-18 | End: 2025-04-18

## 2025-04-18 RX ORDER — NEOMYCIN/BACITRACIN/POLYMYXINB 3.5-400-5K
OINTMENT (GRAM) TOPICAL ONCE
OUTPATIENT
Start: 2025-04-18 | End: 2025-04-18

## 2025-04-18 ASSESSMENT — PAIN SCALES - GENERAL: PAINLEVEL_OUTOF10: 5

## 2025-04-18 ASSESSMENT — PAIN DESCRIPTION - LOCATION: LOCATION: FOOT

## 2025-04-18 ASSESSMENT — PAIN DESCRIPTION - ORIENTATION: ORIENTATION: RIGHT

## 2025-04-18 NOTE — FLOWSHEET NOTE
04/18/25 1315   Right Leg Edema Point of Measurement   Leg circumference 40.6 cm   Ankle circumference 24.4 cm   Compression Therapy Tubular elastic support bandage   RLE Neurovascular Assessment   Capillary Refill Less than/Equal to 3 seconds   Color Appropriate for Ethnicity   Temperature Warm   R Pedal Pulse +2   Wound 12/30/24 Foot Right # 1   Date First Assessed/Time First Assessed: 12/30/24 0935   Present on Original Admission: Yes  Wound Approximate Age at First Assessment (Weeks): 4 weeks  Primary Wound Type: Diabetic Ulcer  Location: Foot  Wound Location Orientation: Right  Wound Descr...   Wound Image    Wound Etiology Diabetic   Dressing Status Intact   Wound Cleansed Cleansed with saline   Offloading for Diabetic Foot Ulcers Offloading not ordered   Wound Length (cm) 4.6 cm   Wound Width (cm) 1.2 cm   Wound Depth (cm) 0.1 cm   Wound Surface Area (cm^2) 5.52 cm^2   Change in Wound Size % (l*w) 90.42   Wound Volume (cm^3) 0.552 cm^3   Wound Healing % 98   Wound Assessment Granulation tissue   Drainage Amount Moderate (25-50%)   Drainage Description Serosanguinous   Odor None   Haily-wound Assessment Fragile   Margins Defined edges   Wound Thickness Description not for Pressure Injury Full thickness   Pain Assessment   Pain Assessment 0-10   Pain Level 5   Pain Location Foot   Pain Orientation Right     /72   Pulse 95   Temp 97.6 °F (36.4 °C) (Temporal)   Resp 16

## 2025-04-18 NOTE — WOUND CARE
Wound Assessment Granulation tissue 04/18/25 1315   Drainage Amount Moderate (25-50%) 04/18/25 1315   Drainage Description Serosanguinous 04/18/25 1315   Odor None 04/18/25 1315   Haily-wound Assessment Fragile 04/18/25 1315   Margins Defined edges 04/18/25 1315   Wound Thickness Description not for Pressure Injury Full thickness 04/18/25 1315   Number of days: 109         Total Surface Area Debrided:  5.52 sq cm     Estimated Blood Loss:  Minimal    Hemostasis Achieved: Pressure    Procedural Pain: 0 / 10     Post Procedural Pain: 0 / 10     Response to treatment: Well tolerated by patient    Excisional debridement of wound right foot   Apply silver collagen, and foam border.  Wife to perform above dressings every other day   Auth for skin sub is pending   Return to clinic in 3 weeks

## 2025-04-18 NOTE — PATIENT INSTRUCTIONS
Discharge Instructions for  [facility]  [address]  [address]  [place] [address]  Phone: [phone] Fax: [phone]    NAME:  Milton Hughes  YOB: 1970  MEDICAL RECORD NUMBER:  [id number]  DATE:  April 18, 2025    Home health - [facility]    WOUND CARE ORDERS:  Right foot wound - Cleanse with mild soap and water then pat dry. Apply collagen with silver to wound. Cover with border foam. Change dressing 3 times per week.  Home health to continue SN for wound care.      Activity:  [] Elevate leg(s) above the level of the heart when sitting.  [] Avoid prolonged standing in one place.   [] Do no get dressing/wrap wet.       Dietary:  [] Diet as tolerated      [] Diabetic Diet            [] Increase Protein: examples (Meat, cheese, eggs, greek yogurt, fish, nuts)          [] Alin Therapeutic Nutrition Powder  [] Other:        Return Appointment:  [x] Return Appointment: With Dr. Lay Daley in 3 Week(s)  [] Nurse Visit :   [] Ordered tests:      Electronically signed Carmen Rust RN on 4/18/2025 at 11:19 AM     Wound Care Center Information: Should you experience any significant changes in your wound(s) or have questions about your wound care, please contact the [facility] at MONDAY - FRIDAY 8:00 am - 4:30.  If you need help with your wound outside these hours and cannot wait until we are again available, contact your PCP or go to the hospital emergency room.     PLEASE NOTE: IF YOU ARE UNABLE TO OBTAIN WOUND SUPPLIES, CONTINUE TO USE THE SUPPLIES YOU HAVE AVAILABLE UNTIL YOU ARE ABLE TO REACH US. IT IS MOST IMPORTANT TO KEEP THE WOUND COVERED AT ALL TIMES.     Physician Signature:_______________________    Date: ___________ Time:  ____________

## 2025-04-18 NOTE — FLOWSHEET NOTE
04/18/25 1347   Wound 12/30/24 Foot Right # 1   Date First Assessed/Time First Assessed: 12/30/24 0935   Present on Original Admission: Yes  Wound Approximate Age at First Assessment (Weeks): 4 weeks  Primary Wound Type: Diabetic Ulcer  Location: Foot  Wound Location Orientation: Right  Wound Descr...   Dressing/Treatment Collagen with Ag;Silicone border   Post-Procedure Length (cm) 5.8 cm   Post-Procedure Width (cm) 1.2 cm   Post-Procedure Depth (cm) 0.4 cm   Post-Procedure Surface Area (cm^2) 6.96 cm^2   Post-Procedure Volume (cm^3) 2.784 cm^3

## 2025-04-24 ENCOUNTER — OFFICE VISIT (OUTPATIENT)
Age: 55
End: 2025-04-24
Payer: COMMERCIAL

## 2025-04-24 VITALS
RESPIRATION RATE: 16 BRPM | SYSTOLIC BLOOD PRESSURE: 139 MMHG | HEIGHT: 71 IN | HEART RATE: 67 BPM | DIASTOLIC BLOOD PRESSURE: 88 MMHG | WEIGHT: 267.9 LBS | BODY MASS INDEX: 37.51 KG/M2 | OXYGEN SATURATION: 98 % | TEMPERATURE: 98.2 F

## 2025-04-24 DIAGNOSIS — E11.621 DIABETIC ULCER OF RIGHT MIDFOOT ASSOCIATED WITH TYPE 2 DIABETES MELLITUS, WITH NECROSIS OF BONE (HCC): Chronic | ICD-10-CM

## 2025-04-24 DIAGNOSIS — E66.812 CLASS 2 SEVERE OBESITY WITH SERIOUS COMORBIDITY AND BODY MASS INDEX (BMI) OF 37.0 TO 37.9 IN ADULT, UNSPECIFIED OBESITY TYPE (HCC): Chronic | ICD-10-CM

## 2025-04-24 DIAGNOSIS — L97.414 DIABETIC ULCER OF RIGHT MIDFOOT ASSOCIATED WITH TYPE 2 DIABETES MELLITUS, WITH NECROSIS OF BONE (HCC): ICD-10-CM

## 2025-04-24 DIAGNOSIS — E66.01 CLASS 2 SEVERE OBESITY WITH SERIOUS COMORBIDITY AND BODY MASS INDEX (BMI) OF 37.0 TO 37.9 IN ADULT, UNSPECIFIED OBESITY TYPE (HCC): Chronic | ICD-10-CM

## 2025-04-24 DIAGNOSIS — E11.621 DIABETIC ULCER OF RIGHT MIDFOOT ASSOCIATED WITH TYPE 2 DIABETES MELLITUS, WITH NECROSIS OF BONE (HCC): ICD-10-CM

## 2025-04-24 DIAGNOSIS — Z12.11 SCREEN FOR COLON CANCER: Primary | ICD-10-CM

## 2025-04-24 DIAGNOSIS — L97.414 DIABETIC ULCER OF RIGHT MIDFOOT ASSOCIATED WITH TYPE 2 DIABETES MELLITUS, WITH NECROSIS OF BONE (HCC): Chronic | ICD-10-CM

## 2025-04-24 DIAGNOSIS — R03.0 ELEVATED BLOOD PRESSURE, SITUATIONAL: ICD-10-CM

## 2025-04-24 PROCEDURE — 99214 OFFICE O/P EST MOD 30 MIN: CPT | Performed by: NURSE PRACTITIONER

## 2025-04-24 PROCEDURE — 3079F DIAST BP 80-89 MM HG: CPT | Performed by: NURSE PRACTITIONER

## 2025-04-24 PROCEDURE — 3075F SYST BP GE 130 - 139MM HG: CPT | Performed by: NURSE PRACTITIONER

## 2025-04-24 PROCEDURE — 3044F HG A1C LEVEL LT 7.0%: CPT | Performed by: NURSE PRACTITIONER

## 2025-04-24 ASSESSMENT — ENCOUNTER SYMPTOMS
SHORTNESS OF BREATH: 0
VOMITING: 0
ABDOMINAL PAIN: 0
NAUSEA: 0
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
## 2025-04-24 NOTE — PROGRESS NOTES
Chief Complaint   Patient presents with    Follow-up     Urine test    Referral - General     Dermatology         Health Maintenance Due   Topic Date Due    HIV screen  Never done    Diabetic Alb to Cr ratio (uACR) test  Never done    Diabetic retinal exam  Never done    Hepatitis C screen  Never done    Hepatitis B vaccine (1 of 3 - 19+ 3-dose series) Never done    Pneumococcal 50+ years Vaccine (1 of 2 - PCV) Never done    Colorectal Cancer Screen  Never done    Shingles vaccine (1 of 2) Never done    DTaP/Tdap/Td vaccine (1 - Tdap) 03/27/2024         \"Have you been to the ER, urgent care clinic since your last visit?  Hospitalized since your last visit?\"    NO    “Have you seen or consulted any other health care providers outside of [facility] since your last visit?”    Wound care    “Have you had a colorectal cancer screening such as a colonoscopy/FIT/Cologuard?    NO    No colonoscopy on file  No cologuard on file  No FIT/FOBT on file   No flexible sigmoidoscopy on file

## 2025-04-24 NOTE — PATIENT INSTRUCTIONS
Measure your blood pressure twice a day -- in the morning before taking your medications and in the evening before going to bed.   Take at least two readings one minute apart each time.    For best results, sit comfortably with both feet on the floor for at least five minutes before taking a measurement. Sit calmly and don’t talk.    When taking your blood pressure, rest your arm on a table so the blood pressure cuff is at about the same height as your heart.    Record your blood pressure and show it to your health care professional at  every visit.    Ideally your goal is less than 140/90.

## 2025-04-25 ENCOUNTER — RESULTS FOLLOW-UP (OUTPATIENT)
Age: 55
End: 2025-04-25

## 2025-04-25 LAB
ALBUMIN SERPL-MCNC: 3.6 G/DL (ref 3.5–5)
ALBUMIN/GLOB SERPL: 1.2 (ref 1.1–2.2)
ALP SERPL-CCNC: 88 U/L (ref 45–117)
ALT SERPL-CCNC: 35 U/L (ref 12–78)
ANION GAP SERPL CALC-SCNC: 3 MMOL/L (ref 2–12)
AST SERPL-CCNC: 39 U/L (ref 15–37)
BILIRUB SERPL-MCNC: 0.6 MG/DL (ref 0.2–1)
BUN SERPL-MCNC: 16 MG/DL (ref 6–20)
BUN/CREAT SERPL: 24 (ref 12–20)
CALCIUM SERPL-MCNC: 8.8 MG/DL (ref 8.5–10.1)
CHLORIDE SERPL-SCNC: 107 MMOL/L (ref 97–108)
CO2 SERPL-SCNC: 32 MMOL/L (ref 21–32)
CREAT SERPL-MCNC: 0.68 MG/DL (ref 0.7–1.3)
CREAT UR-MCNC: 184 MG/DL
GLOBULIN SER CALC-MCNC: 3 G/DL (ref 2–4)
GLUCOSE SERPL-MCNC: 143 MG/DL (ref 65–100)
MICROALBUMIN UR-MCNC: 1.95 MG/DL
MICROALBUMIN/CREAT UR-RTO: 11 MG/G (ref 0–30)
POTASSIUM SERPL-SCNC: 4 MMOL/L (ref 3.5–5.1)
PROT SERPL-MCNC: 6.6 G/DL (ref 6.4–8.2)
SODIUM SERPL-SCNC: 142 MMOL/L (ref 136–145)
SPECIMEN HOLD: NORMAL

## 2025-04-29 ENCOUNTER — OFFICE VISIT (OUTPATIENT)
Age: 55
End: 2025-04-29
Payer: COMMERCIAL

## 2025-04-29 DIAGNOSIS — E11.59 TYPE 2 DIABETES MELLITUS WITH OTHER CIRCULATORY COMPLICATION, WITH LONG-TERM CURRENT USE OF INSULIN (HCC): Primary | ICD-10-CM

## 2025-04-29 DIAGNOSIS — Z79.4 TYPE 2 DIABETES MELLITUS WITH OTHER CIRCULATORY COMPLICATION, WITH LONG-TERM CURRENT USE OF INSULIN (HCC): Primary | ICD-10-CM

## 2025-04-29 PROCEDURE — G0108 DIAB MANAGE TRN  PER INDIV: HCPCS

## 2025-04-29 NOTE — PROGRESS NOTES
[facility]  Diabetes Self-Management Education & Support Program  Post-program Evaluation    EVALUATION @ COMPLETION OF THE PROGRAM    Milton Hughes completed 5.5 hours of diabetes self-management education. The participant acquired new knowledge and demonstrated new skills during the program.     The participant worked on the following SMART GOAL(s) to improve their diabetes self-management:     Obtain glucose tablets  ACHIEVEMENT OF GOAL(S) : 0-24%    The participant's pre-program A1c was 10.3  Lab Results   Component Value Date/Time    BIY5KYGF 6.4 02/28/2025 10:50 AM    LABA1C 10.3 11/30/2024 06:20 AM   . The post-evaluation A1c is 6.4.    The participant improved their Diabetes Skills, Confidence and Preparedness Index (scored out of 7):    Total score: 6.5  Skills: 6.7  Confidence: 5.8  Preparedness: 7    FINAL RECOMMENDATIONS:  Have fast acting CHO available .     Next provider visit is scheduled for 10/25       AMIRA BUTCHER RN on 4/29/2025     Metric Patient's responses (4/29/2025) Areas for improvement     Healthy Eating       The participant is using the Healthy Plate to control carbohydrate intake - Yes    The participant reads food labels accurately - Yes          Being Active       The participant performs physical activity where your heart beats faster and your breathing is harder than normal for 30 minutes or more? 0 day(s)     In a typical week, the participant performs physical activity 0 day(s)   Consider chair exercises.       Monitoring   The participant is monitoring their blood sugars? Yes    The participant is monitoring >5x/day, CGM    Blood sugar ranges are 77% time in range    The participant improved their A1c - Yes    The participant understands the meaning of the A1c - Yes        Taking Medications   The participant understands what their diabetes medications do Yes    The participant can afford your diabetes medications Yes    The participant does not miss

## 2025-05-09 ENCOUNTER — HOSPITAL ENCOUNTER (OUTPATIENT)
Facility: HOSPITAL | Age: 55
Discharge: HOME | End: 2025-05-09
Attending: SURGERY
Payer: COMMERCIAL

## 2025-05-09 VITALS
DIASTOLIC BLOOD PRESSURE: 79 MMHG | SYSTOLIC BLOOD PRESSURE: 146 MMHG | RESPIRATION RATE: 16 BRPM | HEART RATE: 80 BPM | TEMPERATURE: 98.3 F

## 2025-05-09 DIAGNOSIS — E11.621 DIABETIC ULCER OF RIGHT MIDFOOT ASSOCIATED WITH TYPE 2 DIABETES MELLITUS, WITH NECROSIS OF BONE (HCC): Primary | ICD-10-CM

## 2025-05-09 DIAGNOSIS — L97.414 DIABETIC ULCER OF RIGHT MIDFOOT ASSOCIATED WITH TYPE 2 DIABETES MELLITUS, WITH NECROSIS OF BONE (HCC): Primary | ICD-10-CM

## 2025-05-09 PROCEDURE — 99213 OFFICE O/P EST LOW 20 MIN: CPT

## 2025-05-09 RX ORDER — SILVER SULFADIAZINE 10 MG/G
CREAM TOPICAL ONCE
OUTPATIENT
Start: 2025-05-09 | End: 2025-05-09

## 2025-05-09 RX ORDER — BETAMETHASONE DIPROPIONATE 0.5 MG/G
CREAM TOPICAL ONCE
OUTPATIENT
Start: 2025-05-09 | End: 2025-05-09

## 2025-05-09 RX ORDER — GINSENG 100 MG
CAPSULE ORAL ONCE
OUTPATIENT
Start: 2025-05-09 | End: 2025-05-09

## 2025-05-09 RX ORDER — LIDOCAINE HYDROCHLORIDE 40 MG/ML
SOLUTION TOPICAL ONCE
OUTPATIENT
Start: 2025-05-09 | End: 2025-05-09

## 2025-05-09 RX ORDER — CLOBETASOL PROPIONATE 0.5 MG/G
OINTMENT TOPICAL ONCE
OUTPATIENT
Start: 2025-05-09 | End: 2025-05-09

## 2025-05-09 RX ORDER — MUPIROCIN 20 MG/G
OINTMENT TOPICAL ONCE
OUTPATIENT
Start: 2025-05-09 | End: 2025-05-09

## 2025-05-09 RX ORDER — BACITRACIN ZINC AND POLYMYXIN B SULFATE 500; 1000 [USP'U]/G; [USP'U]/G
OINTMENT TOPICAL ONCE
OUTPATIENT
Start: 2025-05-09 | End: 2025-05-09

## 2025-05-09 RX ORDER — LIDOCAINE HYDROCHLORIDE 20 MG/ML
JELLY TOPICAL ONCE
OUTPATIENT
Start: 2025-05-09 | End: 2025-05-09

## 2025-05-09 RX ORDER — LIDOCAINE 40 MG/G
CREAM TOPICAL ONCE
OUTPATIENT
Start: 2025-05-09 | End: 2025-05-09

## 2025-05-09 RX ORDER — NEOMYCIN/BACITRACIN/POLYMYXINB 3.5-400-5K
OINTMENT (GRAM) TOPICAL ONCE
OUTPATIENT
Start: 2025-05-09 | End: 2025-05-09

## 2025-05-09 RX ORDER — SODIUM CHLOR/HYPOCHLOROUS ACID 0.033 %
SOLUTION, IRRIGATION IRRIGATION ONCE
OUTPATIENT
Start: 2025-05-09 | End: 2025-05-09

## 2025-05-09 RX ORDER — GENTAMICIN SULFATE 1 MG/G
OINTMENT TOPICAL ONCE
OUTPATIENT
Start: 2025-05-09 | End: 2025-05-09

## 2025-05-09 RX ORDER — TRIAMCINOLONE ACETONIDE 1 MG/G
OINTMENT TOPICAL ONCE
OUTPATIENT
Start: 2025-05-09 | End: 2025-05-09

## 2025-05-09 RX ORDER — LIDOCAINE 50 MG/G
OINTMENT TOPICAL ONCE
OUTPATIENT
Start: 2025-05-09 | End: 2025-05-09

## 2025-05-09 ASSESSMENT — PAIN SCALES - GENERAL: PAINLEVEL_OUTOF10: 5

## 2025-05-09 ASSESSMENT — PAIN DESCRIPTION - LOCATION: LOCATION: FOOT

## 2025-05-09 ASSESSMENT — PAIN DESCRIPTION - ORIENTATION: ORIENTATION: RIGHT
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
## 2025-05-09 NOTE — PATIENT INSTRUCTIONS
Discharge Instructions for  [facility]  [address]  [address]  [place] [address]  Phone: [phone] Fax: [phone]    NAME:  Milton Hughes  YOB: 1970  MEDICAL RECORD NUMBER:  [id number]  DATE:  May 9, 2025    Home health - [facility]    WOUND CARE ORDERS:  Right foot wound - Cleanse with mild soap and water then pat dry. Apply collagen with silver to wound. Cover with gauze then kerlix. Apply single layer tubigrip. Change dressing 3 times per week.    Home health to continue SN for wound care.    Awaiting insurance approval for skin graft.    Activity:  [] Elevate leg(s) above the level of the heart when sitting.  [] Avoid prolonged standing in one place.   [] Do no get dressing/wrap wet.       Dietary:  [] Diet as tolerated      [] Diabetic Diet            [] Increase Protein: examples (Meat, cheese, eggs, greek yogurt, fish, nuts)          [] Alin Therapeutic Nutrition Powder  [] Other:        Return Appointment:  [x] Return Appointment: With Dr. Lay Daley in 2 Week(s)  [] Nurse Visit :   [] Ordered tests:      Electronically signed Carmen Rust RN on 5/9/2025 at 10:39 AM     Wound Care Center Information: Should you experience any significant changes in your wound(s) or have questions about your wound care, please contact the [facility] at MONDAY - FRIDAY 8:00 am - 4:30.  If you need help with your wound outside these hours and cannot wait until we are again available, contact your PCP or go to the hospital emergency room.     PLEASE NOTE: IF YOU ARE UNABLE TO OBTAIN WOUND SUPPLIES, CONTINUE TO USE THE SUPPLIES YOU HAVE AVAILABLE UNTIL YOU ARE ABLE TO REACH US. IT IS MOST IMPORTANT TO KEEP THE WOUND COVERED AT ALL TIMES.     Physician Signature:_______________________    Date: ___________ Time:  ____________

## 2025-05-09 NOTE — FLOWSHEET NOTE
05/09/25 1530   Right Leg Edema Point of Measurement   Compression Therapy Tubular elastic support bandage   RLE Neurovascular Assessment   Capillary Refill Less than/Equal to 3 seconds   Color Appropriate for Ethnicity   Temperature Warm   R Pedal Pulse +2   Wound 12/30/24 Foot Right # 1   Date First Assessed/Time First Assessed: 12/30/24 0935   Present on Original Admission: Yes  Wound Approximate Age at First Assessment (Weeks): 4 weeks  Primary Wound Type: Diabetic Ulcer  Location: Foot  Wound Location Orientation: Right  Wound Descr...   Wound Image    Wound Etiology Diabetic   Dressing Status Intact   Wound Cleansed Cleansed with saline   Offloading for Diabetic Foot Ulcers Offloading not ordered   Wound Length (cm) 4.8 cm   Wound Width (cm) 0.9 cm   Wound Depth (cm) 0.3 cm   Wound Surface Area (cm^2) 4.32 cm^2   Change in Wound Size % (l*w) 92.5   Wound Volume (cm^3) 1.296 cm^3   Wound Healing % 96   Wound Assessment Granulation tissue   Drainage Amount Moderate (25-50%)   Drainage Description Serosanguinous   Odor None   Haily-wound Assessment Fragile;Maceration   Margins Defined edges   Wound Thickness Description not for Pressure Injury Full thickness   Pain Assessment   Pain Assessment 0-10   Pain Level 5   Pain Location Foot   Pain Orientation Right     BP (!) 146/79   Pulse 80   Temp 98.3 °F (36.8 °C) (Temporal)   Resp 16

## 2025-05-13 ENCOUNTER — COMMUNITY OUTREACH (OUTPATIENT)
Age: 55
End: 2025-05-13

## 2025-05-13 DIAGNOSIS — Z79.4 TYPE 2 DIABETES MELLITUS WITH OTHER CIRCULATORY COMPLICATION, WITH LONG-TERM CURRENT USE OF INSULIN (HCC): Chronic | ICD-10-CM

## 2025-05-13 DIAGNOSIS — E11.59 TYPE 2 DIABETES MELLITUS WITH OTHER CIRCULATORY COMPLICATION, WITH LONG-TERM CURRENT USE OF INSULIN (HCC): Chronic | ICD-10-CM

## 2025-05-13 RX ORDER — GLIPIZIDE 10 MG/1
10 TABLET ORAL 2 TIMES DAILY
Qty: 60 TABLET | Refills: 3 | Status: SHIPPED | OUTPATIENT
Start: 2025-05-13

## 2025-05-13 RX ORDER — GLIPIZIDE 10 MG/1
10 TABLET ORAL 2 TIMES DAILY
Qty: 60 TABLET | Refills: 0 | OUTPATIENT
Start: 2025-05-13

## 2025-05-23 ENCOUNTER — HOSPITAL ENCOUNTER (OUTPATIENT)
Facility: HOSPITAL | Age: 55
Discharge: HOME | End: 2025-05-23
Attending: SURGERY
Payer: COMMERCIAL

## 2025-05-23 VITALS
RESPIRATION RATE: 16 BRPM | HEART RATE: 84 BPM | DIASTOLIC BLOOD PRESSURE: 74 MMHG | TEMPERATURE: 97.9 F | SYSTOLIC BLOOD PRESSURE: 137 MMHG

## 2025-05-23 DIAGNOSIS — E11.621 DIABETIC ULCER OF RIGHT MIDFOOT ASSOCIATED WITH TYPE 2 DIABETES MELLITUS, WITH NECROSIS OF BONE (HCC): Primary | ICD-10-CM

## 2025-05-23 DIAGNOSIS — L97.414 DIABETIC ULCER OF RIGHT MIDFOOT ASSOCIATED WITH TYPE 2 DIABETES MELLITUS, WITH NECROSIS OF BONE (HCC): Primary | ICD-10-CM

## 2025-05-23 PROCEDURE — 87205 SMEAR GRAM STAIN: CPT

## 2025-05-23 PROCEDURE — 11042 DBRDMT SUBQ TIS 1ST 20SQCM/<: CPT

## 2025-05-23 PROCEDURE — 87070 CULTURE OTHR SPECIMN AEROBIC: CPT

## 2025-05-23 RX ORDER — SODIUM CHLOR/HYPOCHLOROUS ACID 0.033 %
SOLUTION, IRRIGATION IRRIGATION ONCE
OUTPATIENT
Start: 2025-05-23 | End: 2025-05-23

## 2025-05-23 RX ORDER — LIDOCAINE 50 MG/G
OINTMENT TOPICAL ONCE
Status: CANCELLED | OUTPATIENT
Start: 2025-05-23 | End: 2025-05-23

## 2025-05-23 RX ORDER — LIDOCAINE HYDROCHLORIDE 40 MG/ML
SOLUTION TOPICAL ONCE
OUTPATIENT
Start: 2025-05-23 | End: 2025-05-23

## 2025-05-23 RX ORDER — GINSENG 100 MG
CAPSULE ORAL ONCE
Status: CANCELLED | OUTPATIENT
Start: 2025-05-23 | End: 2025-05-23

## 2025-05-23 RX ORDER — BACITRACIN ZINC AND POLYMYXIN B SULFATE 500; 1000 [USP'U]/G; [USP'U]/G
OINTMENT TOPICAL ONCE
Status: CANCELLED | OUTPATIENT
Start: 2025-05-23 | End: 2025-05-23

## 2025-05-23 RX ORDER — LIDOCAINE HYDROCHLORIDE 20 MG/ML
JELLY TOPICAL ONCE
Status: CANCELLED | OUTPATIENT
Start: 2025-05-23 | End: 2025-05-23

## 2025-05-23 RX ORDER — LIDOCAINE HYDROCHLORIDE 20 MG/ML
JELLY TOPICAL ONCE
OUTPATIENT
Start: 2025-05-23 | End: 2025-05-23

## 2025-05-23 RX ORDER — NEOMYCIN/BACITRACIN/POLYMYXINB 3.5-400-5K
OINTMENT (GRAM) TOPICAL ONCE
Status: CANCELLED | OUTPATIENT
Start: 2025-05-23 | End: 2025-05-23

## 2025-05-23 RX ORDER — MUPIROCIN 20 MG/G
OINTMENT TOPICAL ONCE
OUTPATIENT
Start: 2025-05-23 | End: 2025-05-23

## 2025-05-23 RX ORDER — CLOBETASOL PROPIONATE 0.5 MG/G
OINTMENT TOPICAL ONCE
OUTPATIENT
Start: 2025-05-23 | End: 2025-05-23

## 2025-05-23 RX ORDER — CLOBETASOL PROPIONATE 0.5 MG/G
OINTMENT TOPICAL ONCE
Status: CANCELLED | OUTPATIENT
Start: 2025-05-23 | End: 2025-05-23

## 2025-05-23 RX ORDER — NEOMYCIN/BACITRACIN/POLYMYXINB 3.5-400-5K
OINTMENT (GRAM) TOPICAL ONCE
OUTPATIENT
Start: 2025-05-23 | End: 2025-05-23

## 2025-05-23 RX ORDER — LIDOCAINE HYDROCHLORIDE 40 MG/ML
SOLUTION TOPICAL ONCE
Status: CANCELLED | OUTPATIENT
Start: 2025-05-23 | End: 2025-05-23

## 2025-05-23 RX ORDER — TRIAMCINOLONE ACETONIDE 1 MG/G
OINTMENT TOPICAL ONCE
OUTPATIENT
Start: 2025-05-23 | End: 2025-05-23

## 2025-05-23 RX ORDER — GINSENG 100 MG
CAPSULE ORAL ONCE
OUTPATIENT
Start: 2025-05-23 | End: 2025-05-23

## 2025-05-23 RX ORDER — BETAMETHASONE DIPROPIONATE 0.5 MG/G
CREAM TOPICAL ONCE
Status: CANCELLED | OUTPATIENT
Start: 2025-05-23 | End: 2025-05-23

## 2025-05-23 RX ORDER — SILVER SULFADIAZINE 10 MG/G
CREAM TOPICAL ONCE
Status: CANCELLED | OUTPATIENT
Start: 2025-05-23 | End: 2025-05-23

## 2025-05-23 RX ORDER — GENTAMICIN SULFATE 1 MG/G
OINTMENT TOPICAL ONCE
OUTPATIENT
Start: 2025-05-23 | End: 2025-05-23

## 2025-05-23 RX ORDER — LIDOCAINE 40 MG/G
CREAM TOPICAL ONCE
OUTPATIENT
Start: 2025-05-23 | End: 2025-05-23

## 2025-05-23 RX ORDER — BACITRACIN ZINC AND POLYMYXIN B SULFATE 500; 1000 [USP'U]/G; [USP'U]/G
OINTMENT TOPICAL ONCE
OUTPATIENT
Start: 2025-05-23 | End: 2025-05-23

## 2025-05-23 RX ORDER — SODIUM CHLOR/HYPOCHLOROUS ACID 0.033 %
SOLUTION, IRRIGATION IRRIGATION ONCE
Status: CANCELLED | OUTPATIENT
Start: 2025-05-23 | End: 2025-05-23

## 2025-05-23 RX ORDER — BETAMETHASONE DIPROPIONATE 0.5 MG/G
CREAM TOPICAL ONCE
OUTPATIENT
Start: 2025-05-23 | End: 2025-05-23

## 2025-05-23 RX ORDER — SILVER SULFADIAZINE 10 MG/G
CREAM TOPICAL ONCE
OUTPATIENT
Start: 2025-05-23 | End: 2025-05-23

## 2025-05-23 RX ORDER — MUPIROCIN 20 MG/G
OINTMENT TOPICAL ONCE
Status: CANCELLED | OUTPATIENT
Start: 2025-05-23 | End: 2025-05-23

## 2025-05-23 RX ORDER — GENTAMICIN SULFATE 1 MG/G
OINTMENT TOPICAL ONCE
Status: CANCELLED | OUTPATIENT
Start: 2025-05-23 | End: 2025-05-23

## 2025-05-23 RX ORDER — TRIAMCINOLONE ACETONIDE 1 MG/G
OINTMENT TOPICAL ONCE
Status: CANCELLED | OUTPATIENT
Start: 2025-05-23 | End: 2025-05-23

## 2025-05-23 RX ORDER — LIDOCAINE 40 MG/G
CREAM TOPICAL ONCE
Status: CANCELLED | OUTPATIENT
Start: 2025-05-23 | End: 2025-05-23

## 2025-05-23 RX ORDER — LIDOCAINE 50 MG/G
OINTMENT TOPICAL ONCE
OUTPATIENT
Start: 2025-05-23 | End: 2025-05-23

## 2025-05-23 ASSESSMENT — PAIN DESCRIPTION - LOCATION: LOCATION: FOOT

## 2025-05-23 ASSESSMENT — PAIN DESCRIPTION - ORIENTATION: ORIENTATION: RIGHT

## 2025-05-23 ASSESSMENT — PAIN SCALES - GENERAL: PAINLEVEL_OUTOF10: 5

## 2025-05-23 NOTE — PATIENT INSTRUCTIONS
Discharge Instructions for  [facility]  [address]  [address]  [place] [address]  Phone: [phone] Fax: [phone]    NAME:  Milton Hughes  YOB: 1970  MEDICAL RECORD NUMBER:  [id number]  DATE:  May 23, 2025    Home health - [facility]    WOUND CARE ORDERS:  Right foot wound - Cleanse with mild soap and water then pat dry. Apply collagen with silver to wound. Cover with gauze then kerlix . Apply single layer tubigrip. Change dressing 2 times per week.    Home health to continue SN for wound care.    Awaiting insurance approval for skin graft.    Activity:  [] Elevate leg(s) above the level of the heart when sitting.  [] Avoid prolonged standing in one place.   [] Do no get dressing/wrap wet.       Dietary:  [] Diet as tolerated      [] Diabetic Diet            [] Increase Protein: examples (Meat, cheese, eggs, greek yogurt, fish, nuts)          [] Alin Therapeutic Nutrition Powder  [] Other:        Return Appointment:  [x] Return Appointment: With Dr. Lay Daley in 3Week(s)  [] Nurse Visit :   [] Ordered tests:      Electronically signed MADINA MANZANARES RN on 5/23/2025 at 2:26 PM     Wound Care Center Information: Should you experience any significant changes in your wound(s) or have questions about your wound care, please contact the [facility] at MONDAY - FRIDAY 8:00 am - 4:30.  If you need help with your wound outside these hours and cannot wait until we are again available, contact your PCP or go to the hospital emergency room.     PLEASE NOTE: IF YOU ARE UNABLE TO OBTAIN WOUND SUPPLIES, CONTINUE TO USE THE SUPPLIES YOU HAVE AVAILABLE UNTIL YOU ARE ABLE TO REACH US. IT IS MOST IMPORTANT TO KEEP THE WOUND COVERED AT ALL TIMES.     Physician Signature:_______________________    Date: ___________ Time:  ____________

## 2025-05-23 NOTE — WOUND CARE
Debridement Wound Care        Problem List Items Addressed This Visit          Endocrine    Diabetic ulcer of right midfoot associated with type 2 diabetes mellitus, with necrosis of bone (HCC) - Primary    Relevant Orders    Initiate Outpatient Wound Care Protocol    Culture, Wound (with Gram Stain)       Procedure Note  Indications:  Based on my examination of this patient's wound(s)/ulcer(s) today, debridement is  required to promote healing and evaluate the wound base.    Performed by: Lay Daley DPM    Consent obtained: Yes    Time out taken: Yes    Debridement: excisional    Using curette the wound(s)/ulcer(s) was/were sharply debrided down through and including the removal of    subcutaneous tissue    Devitalized Tissue Debrided: slough and necrotic/eschar    Pre Debridement Measurements:  Are located in the Wound/Ulcer Documentation Flow Sheet    Diabetic ulcer, fat layer exposed    Wound/Ulcer #: 1    Post Debridement Measurements:  Wound/Ulcer Descriptions are Pre Debridement except measurements:    Wound Care Documentation:  Wound 12/30/24 Foot Right # 1 (Active)   Wound Image   05/23/25 1315   Wound Etiology Diabetic 05/23/25 1315   Dressing Status New dressing applied 05/23/25 1436   Wound Cleansed Cleansed with saline 05/23/25 1315   Dressing/Treatment Alginate with Ag;Gauze dressing/dressing sponge;Roll gauze 05/23/25 1436   Offloading for Diabetic Foot Ulcers Offloading not ordered 05/09/25 1530   Wound Length (cm) 4.6 cm 05/23/25 1315   Wound Width (cm) 0.9 cm 05/23/25 1315   Wound Depth (cm) 0.3 cm 05/23/25 1315   Wound Surface Area (cm^2) 4.14 cm^2 05/23/25 1315   Change in Wound Size % (l*w) 92.81 05/23/25 1315   Wound Volume (cm^3) 1.242 cm^3 05/23/25 1315   Wound Healing % 96 05/23/25 1315   Post-Procedure Length (cm) 4.7 cm 05/23/25 1431   Post-Procedure Width (cm) 1 cm 05/23/25 1431   Post-Procedure Depth (cm) 0.4 cm 05/23/25 1431   Post-Procedure Surface Area (cm^2) 4.7 cm^2 05/23/25

## 2025-05-23 NOTE — FLOWSHEET NOTE
05/23/25 1436   Right Leg Edema Point of Measurement   Compression Therapy Tubular elastic support bandage   Wound 12/30/24 Foot Right # 1   Date First Assessed/Time First Assessed: 12/30/24 0935   Present on Original Admission: Yes  Wound Approximate Age at First Assessment (Weeks): 4 weeks  Primary Wound Type: Diabetic Ulcer  Location: Foot  Wound Location Orientation: Right  Wound Descr...   Dressing Status New dressing applied   Dressing/Treatment Alginate with Ag;Gauze dressing/dressing sponge;Roll gauze

## 2025-05-23 NOTE — FLOWSHEET NOTE
05/23/25 1436   Right Leg Edema Point of Measurement   Compression Therapy Tubular elastic support bandage   Wound 12/30/24 Foot Right # 1   Date First Assessed/Time First Assessed: 12/30/24 0935   Present on Original Admission: Yes  Wound Approximate Age at First Assessment (Weeks): 4 weeks  Primary Wound Type: Diabetic Ulcer  Location: Foot  Wound Location Orientation: Right  Wound Descr...   Dressing Status New dressing applied   Dressing/Treatment Gauze dressing/dressing sponge;Roll gauze;Collagen with Ag

## 2025-05-23 NOTE — FLOWSHEET NOTE
05/23/25 1315   Right Leg Edema Point of Measurement   Compression Therapy Tubular elastic support bandage   RLE Neurovascular Assessment   Capillary Refill Less than/Equal to 3 seconds   Color Appropriate for Ethnicity   Temperature Warm   R Pedal Pulse +2   Wound 12/30/24 Foot Right # 1   Date First Assessed/Time First Assessed: 12/30/24 0935   Present on Original Admission: Yes  Wound Approximate Age at First Assessment (Weeks): 4 weeks  Primary Wound Type: Diabetic Ulcer  Location: Foot  Wound Location Orientation: Right  Wound Descr...   Wound Image    Wound Etiology Diabetic   Dressing Status Old drainage noted   Wound Cleansed Cleansed with saline   Wound Length (cm) 4.6 cm   Wound Width (cm) 0.9 cm   Wound Depth (cm) 0.3 cm   Wound Surface Area (cm^2) 4.14 cm^2   Change in Wound Size % (l*w) 92.81   Wound Volume (cm^3) 1.242 cm^3   Wound Healing % 96   Wound Assessment Granulation tissue   Drainage Amount Moderate (25-50%)   Drainage Description Serosanguinous   Odor None   Haily-wound Assessment Fragile   Margins Defined edges   Wound Thickness Description not for Pressure Injury Full thickness   Pain Assessment   Pain Assessment 0-10   Pain Level 5   Pain Location Foot   Pain Orientation Right         /74   Pulse 84   Temp 97.9 °F (36.6 °C) (Temporal)   Resp 16

## 2025-05-25 LAB
BACTERIA SPEC CULT: ABNORMAL
GRAM STN SPEC: ABNORMAL
SERVICE CMNT-IMP: ABNORMAL

## 2025-05-26 DIAGNOSIS — G54.6 PHANTOM PAIN AFTER AMPUTATION OF LOWER EXTREMITY (HCC): ICD-10-CM

## 2025-05-26 LAB
BACTERIA SPEC CULT: ABNORMAL
GRAM STN SPEC: ABNORMAL
SERVICE CMNT-IMP: ABNORMAL

## 2025-05-26 RX ORDER — PREGABALIN 100 MG/1
100 CAPSULE ORAL 3 TIMES DAILY
Qty: 90 CAPSULE | Refills: 0 | Status: CANCELLED | OUTPATIENT
Start: 2025-05-26 | End: 2025-06-25

## 2025-05-27 DIAGNOSIS — G54.6 PHANTOM PAIN AFTER AMPUTATION OF LOWER EXTREMITY (HCC): ICD-10-CM

## 2025-05-27 RX ORDER — CETIRIZINE HYDROCHLORIDE 10 MG/1
10 TABLET ORAL DAILY
Qty: 90 TABLET | Refills: 0 | Status: SHIPPED | OUTPATIENT
Start: 2025-05-27

## 2025-05-27 RX ORDER — CETIRIZINE HYDROCHLORIDE 10 MG/1
10 TABLET ORAL DAILY
Qty: 30 TABLET | Refills: 0 | OUTPATIENT
Start: 2025-05-27

## 2025-05-27 RX ORDER — PREGABALIN 100 MG/1
100 CAPSULE ORAL 3 TIMES DAILY
Qty: 90 CAPSULE | Refills: 2 | Status: SHIPPED | OUTPATIENT
Start: 2025-05-27 | End: 2025-08-25

## 2025-05-27 RX ORDER — PREGABALIN 100 MG/1
CAPSULE ORAL
Qty: 90 CAPSULE | Refills: 0 | OUTPATIENT
Start: 2025-05-27

## 2025-05-29 RX ORDER — CETIRIZINE HYDROCHLORIDE 10 MG/1
10 TABLET ORAL DAILY
Qty: 30 TABLET | Refills: 0 | Status: SHIPPED | OUTPATIENT
Start: 2025-05-29

## 2025-05-30 ENCOUNTER — CLINICAL DOCUMENTATION (OUTPATIENT)
Facility: HOSPITAL | Age: 55
End: 2025-05-30

## 2025-05-30 NOTE — PROGRESS NOTES
Wound cultures right foot from 5/23/15/grew MRSA, klebsiella, and pseudomonas.  Called to drugstore cipro 500mg #20 to take one po bid with one RF  and bactrim DS #20 to take one PO bid with one RF.  Attempted to notify patient; no voicemail set up  
Unemployed

## 2025-06-03 RX ORDER — CIPROFLOXACIN 500 MG/1
500 TABLET, FILM COATED ORAL 2 TIMES DAILY
Qty: 20 TABLET | Refills: 0 | Status: SHIPPED | OUTPATIENT
Start: 2025-06-03 | End: 2025-06-13

## 2025-06-03 RX ORDER — SULFAMETHOXAZOLE AND TRIMETHOPRIM 800; 160 MG/1; MG/1
1 TABLET ORAL 2 TIMES DAILY
Qty: 20 TABLET | Refills: 0 | Status: SHIPPED | OUTPATIENT
Start: 2025-06-03 | End: 2025-06-13

## 2025-06-04 ENCOUNTER — TELEPHONE (OUTPATIENT)
Age: 55
End: 2025-06-04

## 2025-06-04 NOTE — TELEPHONE ENCOUNTER
I called patient to get him schedule for tomorrow for Pre-Op.    Patient states he was able to get into a practice to get his Pre-Op done today so that way he had it completed since it was all on short notice.

## 2025-06-04 NOTE — TELEPHONE ENCOUNTER
----- Message from JORDANA Urbina CNP sent at 6/4/2025  3:33 PM EDT -----  Regarding: FW: PCP Clearance  Please call Everette Hughes and double book him tomorrow morning for a pre-op appointment at   11:00 am. He is scheduled for the OR on Friday. He needs an H&P within 30 days.     Thanks,  Poly  ----- Message -----  From: Lay Daley DPM  Sent: 6/3/2025   3:05 PM EDT  To: JORDANA Cruz CNP  Subject: PCP Clearance                                    Katherine Pang,    I am doing a procedure on the above patient on Friday and the OR is requesting PCP clearance. Would the patient need to be seen in the office or can you clear him.     Procedure: Wound bed prep with graft application  Local Anesthesia    Please advise.     Thank you     My office fax number is [phone]

## 2025-06-05 ENCOUNTER — TELEPHONE (OUTPATIENT)
Age: 55
End: 2025-06-05

## 2025-06-13 ENCOUNTER — OFFICE VISIT (OUTPATIENT)
Age: 55
End: 2025-06-13
Payer: COMMERCIAL

## 2025-06-13 ENCOUNTER — HOSPITAL ENCOUNTER (OUTPATIENT)
Facility: HOSPITAL | Age: 55
Discharge: HOME | End: 2025-06-13
Attending: SURGERY
Payer: COMMERCIAL

## 2025-06-13 VITALS
BODY MASS INDEX: 39.37 KG/M2 | WEIGHT: 281.2 LBS | HEIGHT: 71 IN | DIASTOLIC BLOOD PRESSURE: 88 MMHG | SYSTOLIC BLOOD PRESSURE: 124 MMHG | HEART RATE: 72 BPM | TEMPERATURE: 98.1 F | RESPIRATION RATE: 16 BRPM | OXYGEN SATURATION: 98 %

## 2025-06-13 VITALS
SYSTOLIC BLOOD PRESSURE: 178 MMHG | RESPIRATION RATE: 16 BRPM | TEMPERATURE: 97.9 F | DIASTOLIC BLOOD PRESSURE: 81 MMHG | HEART RATE: 85 BPM

## 2025-06-13 DIAGNOSIS — S99.922A INJURY OF LEFT GREAT TOE, INITIAL ENCOUNTER: ICD-10-CM

## 2025-06-13 DIAGNOSIS — E11.621 DIABETIC ULCER OF RIGHT MIDFOOT ASSOCIATED WITH TYPE 2 DIABETES MELLITUS, WITH NECROSIS OF BONE (HCC): Primary | ICD-10-CM

## 2025-06-13 DIAGNOSIS — E11.52 TYPE 2 DIABETES MELLITUS WITH DIABETIC PERIPHERAL ANGIOPATHY AND GANGRENE, WITHOUT LONG-TERM CURRENT USE OF INSULIN (HCC): Primary | Chronic | ICD-10-CM

## 2025-06-13 DIAGNOSIS — R19.5 HARD STOOL: Chronic | ICD-10-CM

## 2025-06-13 DIAGNOSIS — L97.414 DIABETIC ULCER OF RIGHT MIDFOOT ASSOCIATED WITH TYPE 2 DIABETES MELLITUS, WITH NECROSIS OF BONE (HCC): Primary | ICD-10-CM

## 2025-06-13 PROCEDURE — 3044F HG A1C LEVEL LT 7.0%: CPT | Performed by: NURSE PRACTITIONER

## 2025-06-13 PROCEDURE — 11042 DBRDMT SUBQ TIS 1ST 20SQCM/<: CPT

## 2025-06-13 PROCEDURE — 99214 OFFICE O/P EST MOD 30 MIN: CPT | Performed by: NURSE PRACTITIONER

## 2025-06-13 PROCEDURE — 3079F DIAST BP 80-89 MM HG: CPT | Performed by: NURSE PRACTITIONER

## 2025-06-13 PROCEDURE — 3074F SYST BP LT 130 MM HG: CPT | Performed by: NURSE PRACTITIONER

## 2025-06-13 RX ORDER — LIDOCAINE 50 MG/G
OINTMENT TOPICAL PRN
OUTPATIENT
Start: 2025-06-13

## 2025-06-13 RX ORDER — LIDOCAINE HYDROCHLORIDE 20 MG/ML
JELLY TOPICAL PRN
OUTPATIENT
Start: 2025-06-13

## 2025-06-13 RX ORDER — GENTAMICIN SULFATE 1 MG/G
OINTMENT TOPICAL PRN
Status: CANCELLED | OUTPATIENT
Start: 2025-06-13

## 2025-06-13 RX ORDER — SILVER SULFADIAZINE 10 MG/G
CREAM TOPICAL PRN
Status: CANCELLED | OUTPATIENT
Start: 2025-06-13

## 2025-06-13 RX ORDER — CLOBETASOL PROPIONATE 0.5 MG/G
OINTMENT TOPICAL PRN
Status: CANCELLED | OUTPATIENT
Start: 2025-06-13

## 2025-06-13 RX ORDER — LIDOCAINE HYDROCHLORIDE 20 MG/ML
JELLY TOPICAL PRN
Status: CANCELLED | OUTPATIENT
Start: 2025-06-13

## 2025-06-13 RX ORDER — LIDOCAINE 50 MG/G
OINTMENT TOPICAL PRN
Status: CANCELLED | OUTPATIENT
Start: 2025-06-13

## 2025-06-13 RX ORDER — GINSENG 100 MG
CAPSULE ORAL PRN
OUTPATIENT
Start: 2025-06-13

## 2025-06-13 RX ORDER — LIDOCAINE 40 MG/G
CREAM TOPICAL PRN
Status: CANCELLED | OUTPATIENT
Start: 2025-06-13

## 2025-06-13 RX ORDER — TRIAMCINOLONE ACETONIDE 1 MG/G
OINTMENT TOPICAL PRN
OUTPATIENT
Start: 2025-06-13

## 2025-06-13 RX ORDER — LIDOCAINE HYDROCHLORIDE 40 MG/ML
SOLUTION TOPICAL PRN
OUTPATIENT
Start: 2025-06-13

## 2025-06-13 RX ORDER — GENTAMICIN SULFATE 1 MG/G
OINTMENT TOPICAL PRN
OUTPATIENT
Start: 2025-06-13

## 2025-06-13 RX ORDER — NEOMYCIN/BACITRACIN/POLYMYXINB 3.5-400-5K
OINTMENT (GRAM) TOPICAL PRN
Status: CANCELLED | OUTPATIENT
Start: 2025-06-13

## 2025-06-13 RX ORDER — SODIUM CHLOR/HYPOCHLOROUS ACID 0.033 %
SOLUTION, IRRIGATION IRRIGATION PRN
Status: CANCELLED | OUTPATIENT
Start: 2025-06-13

## 2025-06-13 RX ORDER — BACITRACIN ZINC AND POLYMYXIN B SULFATE 500; 1000 [USP'U]/G; [USP'U]/G
OINTMENT TOPICAL PRN
OUTPATIENT
Start: 2025-06-13

## 2025-06-13 RX ORDER — GINSENG 100 MG
CAPSULE ORAL PRN
Status: CANCELLED | OUTPATIENT
Start: 2025-06-13

## 2025-06-13 RX ORDER — BETAMETHASONE DIPROPIONATE 0.5 MG/G
CREAM TOPICAL PRN
OUTPATIENT
Start: 2025-06-13

## 2025-06-13 RX ORDER — BETAMETHASONE DIPROPIONATE 0.5 MG/G
CREAM TOPICAL PRN
Status: CANCELLED | OUTPATIENT
Start: 2025-06-13

## 2025-06-13 RX ORDER — CLOBETASOL PROPIONATE 0.5 MG/G
OINTMENT TOPICAL PRN
OUTPATIENT
Start: 2025-06-13

## 2025-06-13 RX ORDER — MUPIROCIN 2 %
OINTMENT (GRAM) TOPICAL PRN
Status: CANCELLED | OUTPATIENT
Start: 2025-06-13

## 2025-06-13 RX ORDER — MUPIROCIN 2 %
OINTMENT (GRAM) TOPICAL PRN
OUTPATIENT
Start: 2025-06-13

## 2025-06-13 RX ORDER — LIDOCAINE HYDROCHLORIDE 40 MG/ML
SOLUTION TOPICAL PRN
Status: CANCELLED | OUTPATIENT
Start: 2025-06-13

## 2025-06-13 RX ORDER — SILVER SULFADIAZINE 10 MG/G
CREAM TOPICAL PRN
OUTPATIENT
Start: 2025-06-13

## 2025-06-13 RX ORDER — NEOMYCIN/BACITRACIN/POLYMYXINB 3.5-400-5K
OINTMENT (GRAM) TOPICAL PRN
OUTPATIENT
Start: 2025-06-13

## 2025-06-13 RX ORDER — BACITRACIN ZINC AND POLYMYXIN B SULFATE 500; 1000 [USP'U]/G; [USP'U]/G
OINTMENT TOPICAL PRN
Status: CANCELLED | OUTPATIENT
Start: 2025-06-13

## 2025-06-13 RX ORDER — LIDOCAINE 40 MG/G
CREAM TOPICAL PRN
OUTPATIENT
Start: 2025-06-13

## 2025-06-13 RX ORDER — SODIUM CHLOR/HYPOCHLOROUS ACID 0.033 %
SOLUTION, IRRIGATION IRRIGATION PRN
OUTPATIENT
Start: 2025-06-13

## 2025-06-13 RX ORDER — TRIAMCINOLONE ACETONIDE 1 MG/G
OINTMENT TOPICAL PRN
Status: CANCELLED | OUTPATIENT
Start: 2025-06-13

## 2025-06-13 ASSESSMENT — ENCOUNTER SYMPTOMS
CHEST TIGHTNESS: 0
ABDOMINAL PAIN: 0
NAUSEA: 0
TROUBLE SWALLOWING: 0
VOMITING: 0

## 2025-06-13 NOTE — FLOWSHEET NOTE
06/13/25 1316   RLE Neurovascular Assessment   Capillary Refill Less than/Equal to 3 seconds   Color Appropriate for Ethnicity   Temperature Warm   R Pedal Pulse +2   Wound 12/30/24 Foot Right # 1   Date First Assessed/Time First Assessed: 12/30/24 0935   Present on Original Admission: Yes  Wound Approximate Age at First Assessment (Weeks): 4 weeks  Primary Wound Type: Diabetic Ulcer  Location: Foot  Wound Location Orientation: Right  Wound Descr...   Wound Image    Wound Etiology Diabetic   Dressing Status Old drainage noted   Wound Cleansed Cleansed with saline   Wound Length (cm) 4 cm   Wound Width (cm) 0.8 cm   Wound Depth (cm) 0.2 cm   Wound Surface Area (cm^2) 3.2 cm^2   Change in Wound Size % (l*w) 94.44   Wound Volume (cm^3) 0.64 cm^3   Wound Healing % 98   Wound Assessment Pink/red   Drainage Amount Moderate (25-50%)   Drainage Description Serosanguinous   Odor None   Haily-wound Assessment Fragile;Dry/flaky   Margins Defined edges   Wound Thickness Description not for Pressure Injury Full thickness   Pain Assessment   Pain Assessment None - Denies Pain     BP (!) 178/81   Pulse 85   Temp 97.9 °F (36.6 °C) (Temporal)   Resp 16

## 2025-06-13 NOTE — WOUND CARE
Debridement Wound Care        Problem List Items Addressed This Visit          Endocrine    Diabetic ulcer of right midfoot associated with type 2 diabetes mellitus, with necrosis of bone (HCC) - Primary    Relevant Orders    MD COMMUNICATION 1    MD COMMUNICATION 2    Initiate Outpatient Wound Care Protocol     Chronic diabetic wound right foot  Procedure Note  Indications:  Based on my examination of this patient's wound(s)/ulcer(s) today, debridement is  required to promote healing and evaluate the wound base.    Performed by: Lay Daley DPM    Consent obtained: Yes    Time out taken: Yes    Debridement: excisional    Using curette the wound(s)/ulcer(s) was/were sharply debrided down through and including the removal of    subcutaneous tissue    Devitalized Tissue Debrided: biofilm, necrotic/eschar, and callus    Pre Debridement Measurements:  Are located in the Wound/Ulcer Documentation Flow Sheet    Diabetic ulcer, fat layer exposed    Wound/Ulcer #: 1    Post Debridement Measurements:  Wound/Ulcer Descriptions are Pre Debridement except measurements:    Wound Care Documentation:  Wound 12/30/24 Foot Right # 1 (Active)   Wound Image   06/13/25 1316   Wound Etiology Diabetic 06/13/25 1316   Dressing Status New dressing applied 06/13/25 1348   Wound Cleansed Cleansed with saline 06/13/25 1348   Dressing/Treatment Other (comment) 06/13/25 1348   Offloading for Diabetic Foot Ulcers Felt or foam 06/13/25 1348   Wound Length (cm) 4 cm 06/13/25 1316   Wound Width (cm) 0.8 cm 06/13/25 1316   Wound Depth (cm) 0.2 cm 06/13/25 1316   Wound Surface Area (cm^2) 3.2 cm^2 06/13/25 1316   Change in Wound Size % (l*w) 94.44 06/13/25 1316   Wound Volume (cm^3) 0.64 cm^3 06/13/25 1316   Wound Healing % 98 06/13/25 1316   Post-Procedure Length (cm) 4 cm 06/13/25 1339   Post-Procedure Width (cm) 1 cm 06/13/25 1339   Post-Procedure Depth (cm) 0.3 cm 06/13/25 1339   Post-Procedure Surface Area (cm^2) 4 cm^2 06/13/25 1339

## 2025-06-13 NOTE — PROGRESS NOTES
Milton Hughes (:  1970) is a 54 y.o. male,Established patient, here for evaluation of the following chief complaint(s):         1. Type 2 diabetes mellitus with diabetic peripheral angiopathy and gangrene, without long-term current use of insulin (HCC)  Comments:  stable  using CGM  A1c pending  No changes to medication  Has a follow-up with endocrinology   Orders:  -     CBC with Auto Differential; Future  -     Comprehensive Metabolic Panel; Future  -     Hemoglobin A1C; Future  2. Injury of left great toe, initial encounter  Comments:  new/acute  stable  cont to monitor  on two abx  Following with podiatry  No hematoma noted  Endorses it is improving  3. Hard stool  Comments:  stable  Increase activity and water  Colace at bedtime as needed       Return in about 3 months (around 2025), or DM f/u.       Subjective     HPI    Pt is here for an H&P for his surgery on initially 25. He will be having skin grafting on right foot for his chronic diabetic ulcer.  Patient did follow-up with podiatry Dr. Daley earlier today.  Note and imaging in the chart.    Hard stools  Mixed  Daily BM use the restroom 2-3 times per day    He does indicate the following acute  Left greater toe swollen, red, not painful. Does not endorse any cause or trauma to his knowledge. Reports not as swollen. On two abx.  States that it looks better today than it has been looking.  Did not appreciate any hematoma, however definitely with the patient to continue to monitor.  Has range of motion.      No other acute complaints.  No latex allergies  No adverse reactions to anesthesia  Patient easily cleared for procedure to right foot under the impression that it will be local.      Vitals:    25 1505   BP: 124/88   BP Site: Right Upper Arm   Patient Position: Sitting   BP Cuff Size: Large Adult   Pulse: 72   Resp: 16   Temp: 98.1 °F (36.7 °C)   TempSrc: Temporal   SpO2: 98%   Weight: 127.6 kg (281 lb 3.2  Consent (Scalp)/Introductory Paragraph: The rationale for Mohs was explained to the patient and consent was obtained. The risks, benefits and alternatives to therapy were discussed in detail. Specifically, the risks of changes in hair growth pattern secondary to repair, infection, scarring, bleeding, prolonged wound healing, incomplete removal, allergy to anesthesia, nerve injury and recurrence were addressed. Prior to the procedure, the treatment site was clearly identified and confirmed by the patient. All components of Universal Protocol/PAUSE Rule completed.

## 2025-06-13 NOTE — PROGRESS NOTES
Chief Complaint   Patient presents with    Follow-up         Health Maintenance Due   Topic Date Due    HIV screen  Never done    Hepatitis C screen  Never done    Hepatitis B vaccine (1 of 3 - 19+ 3-dose series) Never done    Pneumococcal 50+ years Vaccine (1 of 2 - PCV) Never done    Colorectal Cancer Screen  Never done    Shingles vaccine (1 of 2) Never done    Lung Cancer Screening &/or Counseling  Never done    DTaP/Tdap/Td vaccine (1 - Tdap) 03/27/2024         \"Have you been to the ER, urgent care clinic since your last visit?  Hospitalized since your last visit?\"    NO    “Have you seen or consulted any other health care providers outside of [facility] since your last visit?”    Wound care    “Have you had a colorectal cancer screening such as a colonoscopy/FIT/Cologuard?    NO    No colonoscopy on file  No cologuard on file  No FIT/FOBT on file   No flexible sigmoidoscopy on file

## 2025-06-13 NOTE — FLOWSHEET NOTE
06/13/25 1348   Wound 12/30/24 Foot Right # 1   Date First Assessed/Time First Assessed: 12/30/24 0935   Present on Original Admission: Yes  Wound Approximate Age at First Assessment (Weeks): 4 weeks  Primary Wound Type: Diabetic Ulcer  Location: Foot  Wound Location Orientation: Right  Wound Descr...   Dressing Status New dressing applied   Wound Cleansed Cleansed with saline   Dressing/Treatment Other (comment)  (Hydrogel, Collagen AG, Bordered Foam Dressing)   Offloading for Diabetic Foot Ulcers Felt or foam

## 2025-06-13 NOTE — PATIENT INSTRUCTIONS
Discharge Instructions for  [facility]  [address], [address]  [place] [address]  Phone: [phone] Fax: [phone]    NAME:  Mitlon Hughes  YOB: 1970  MEDICAL RECORD NUMBER:  [id number]  DATE:  June 13, 2025      WOUND CARE ORDERS:  Right foot wound - Cleanse with mild soap and water then pat dry. Apply hydrogel then collagen with silver to wound. Cover with foam border.  Apply single layer tubigrip. Change dressing every 3 days.    Home health - none      Activity:  [] Elevate leg(s) above the level of the heart when sitting.  [] Avoid prolonged standing in one place.   [] Do no get dressing/wrap wet.       Dietary:  [] Diet as tolerated      [] Diabetic Diet            [] Increase Protein: examples (Meat, cheese, eggs, greek yogurt, fish, nuts)          [] Alin Therapeutic Nutrition Powder  [] Other:        Return Appointment:  [x] Return Appointment: With Dr. Lay Daley in 2 Week(s)  [] Nurse Visit :   [] Ordered tests:      Electronically signed Carmen Rust RN on 6/13/2025 at 10:43 AM     Wound Care Center Information: Should you experience any significant changes in your wound(s) or have questions about your wound care, please contact the [facility] at MONDAY - FRIDAY 8:00 am - 4:30.  If you need help with your wound outside these hours and cannot wait until we are again available, contact your PCP or go to the hospital emergency room.     PLEASE NOTE: IF YOU ARE UNABLE TO OBTAIN WOUND SUPPLIES, CONTINUE TO USE THE SUPPLIES YOU HAVE AVAILABLE UNTIL YOU ARE ABLE TO REACH US. IT IS MOST IMPORTANT TO KEEP THE WOUND COVERED AT ALL TIMES.     Physician Signature:_______________________    Date: ___________ Time:  ____________      Wound Care Supplies      Supply Company:     Other [employer]     Ordering Center:     [facility]  [address], [address]  [place] [address] [phone]  WOUND CARE Dept:

## 2025-06-14 LAB
ALBUMIN SERPL-MCNC: 3.9 G/DL (ref 3.5–5)
ALBUMIN/GLOB SERPL: 1.2 (ref 1.1–2.2)
ALP SERPL-CCNC: 85 U/L (ref 45–117)
ALT SERPL-CCNC: 50 U/L (ref 12–78)
ANION GAP SERPL CALC-SCNC: 2 MMOL/L (ref 2–12)
AST SERPL-CCNC: 51 U/L (ref 15–37)
BASOPHILS # BLD: 0.03 K/UL (ref 0–0.1)
BASOPHILS NFR BLD: 0.7 % (ref 0–1)
BILIRUB SERPL-MCNC: 0.8 MG/DL (ref 0.2–1)
BUN SERPL-MCNC: 14 MG/DL (ref 6–20)
BUN/CREAT SERPL: 15 (ref 12–20)
CALCIUM SERPL-MCNC: 9.3 MG/DL (ref 8.5–10.1)
CHLORIDE SERPL-SCNC: 105 MMOL/L (ref 97–108)
CO2 SERPL-SCNC: 31 MMOL/L (ref 21–32)
CREAT SERPL-MCNC: 0.95 MG/DL (ref 0.7–1.3)
DIFFERENTIAL METHOD BLD: ABNORMAL
EOSINOPHIL # BLD: 0.08 K/UL (ref 0–0.4)
EOSINOPHIL NFR BLD: 1.8 % (ref 0–7)
ERYTHROCYTE [DISTWIDTH] IN BLOOD BY AUTOMATED COUNT: 13.2 % (ref 11.5–14.5)
EST. AVERAGE GLUCOSE BLD GHB EST-MCNC: 123 MG/DL
GLOBULIN SER CALC-MCNC: 3.3 G/DL (ref 2–4)
GLUCOSE SERPL-MCNC: 183 MG/DL (ref 65–100)
HBA1C MFR BLD: 5.9 % (ref 4–5.6)
HCT VFR BLD AUTO: 45.9 % (ref 36.6–50.3)
HGB BLD-MCNC: 15 G/DL (ref 12.1–17)
IMM GRANULOCYTES # BLD AUTO: 0.01 K/UL (ref 0–0.04)
IMM GRANULOCYTES NFR BLD AUTO: 0.2 % (ref 0–0.5)
LYMPHOCYTES # BLD: 1.08 K/UL (ref 0.8–3.5)
LYMPHOCYTES NFR BLD: 24.5 % (ref 12–49)
MCH RBC QN AUTO: 30.5 PG (ref 26–34)
MCHC RBC AUTO-ENTMCNC: 32.7 G/DL (ref 30–36.5)
MCV RBC AUTO: 93.3 FL (ref 80–99)
MONOCYTES # BLD: 0.35 K/UL (ref 0–1)
MONOCYTES NFR BLD: 8 % (ref 5–13)
NEUTS SEG # BLD: 2.85 K/UL (ref 1.8–8)
NEUTS SEG NFR BLD: 64.8 % (ref 32–75)
NRBC # BLD: 0 K/UL (ref 0–0.01)
NRBC BLD-RTO: 0 PER 100 WBC
PLATELET # BLD AUTO: 98 K/UL (ref 150–400)
PMV BLD AUTO: 11.4 FL (ref 8.9–12.9)
POTASSIUM SERPL-SCNC: 4.7 MMOL/L (ref 3.5–5.1)
PROT SERPL-MCNC: 7.2 G/DL (ref 6.4–8.2)
RBC # BLD AUTO: 4.92 M/UL (ref 4.1–5.7)
RBC MORPH BLD: ABNORMAL
SODIUM SERPL-SCNC: 138 MMOL/L (ref 136–145)
WBC # BLD AUTO: 4.4 K/UL (ref 4.1–11.1)

## 2025-06-16 ENCOUNTER — HOSPITAL ENCOUNTER (OUTPATIENT)
Facility: HOSPITAL | Age: 55
Setting detail: OUTPATIENT SURGERY
Discharge: HOME | End: 2025-06-16
Attending: PODIATRIST | Admitting: PODIATRIST
Payer: COMMERCIAL

## 2025-06-16 VITALS
TEMPERATURE: 97.8 F | WEIGHT: 279 LBS | OXYGEN SATURATION: 98 % | HEART RATE: 97 BPM | RESPIRATION RATE: 16 BRPM | HEIGHT: 71 IN | BODY MASS INDEX: 39.06 KG/M2 | SYSTOLIC BLOOD PRESSURE: 160 MMHG | DIASTOLIC BLOOD PRESSURE: 98 MMHG

## 2025-06-16 PROBLEM — L97.512 RIGHT FOOT ULCER, WITH FAT LAYER EXPOSED (HCC): Status: ACTIVE | Noted: 2025-06-16

## 2025-06-16 PROCEDURE — 3600000002 HC SURGERY LEVEL 2 BASE: Performed by: PODIATRIST

## 2025-06-16 PROCEDURE — 3600000012 HC SURGERY LEVEL 2 ADDTL 15MIN: Performed by: PODIATRIST

## 2025-06-16 PROCEDURE — 87205 SMEAR GRAM STAIN: CPT

## 2025-06-16 PROCEDURE — 2709999900 HC NON-CHARGEABLE SUPPLY: Performed by: PODIATRIST

## 2025-06-16 PROCEDURE — 87077 CULTURE AEROBIC IDENTIFY: CPT

## 2025-06-16 PROCEDURE — 87075 CULTR BACTERIA EXCEPT BLOOD: CPT

## 2025-06-16 PROCEDURE — 87186 SC STD MICRODIL/AGAR DIL: CPT

## 2025-06-16 PROCEDURE — 87070 CULTURE OTHR SPECIMN AEROBIC: CPT

## 2025-06-16 PROCEDURE — 7100000000 HC PACU RECOVERY - FIRST 15 MIN: Performed by: PODIATRIST

## 2025-06-16 PROCEDURE — 7100000010 HC PHASE II RECOVERY - FIRST 15 MIN: Performed by: PODIATRIST

## 2025-06-16 PROCEDURE — 2500000003 HC RX 250 WO HCPCS: Performed by: PODIATRIST

## 2025-06-16 DEVICE — FISH-SKIN GRAFT FOR SURGICAL USE. KERECIS® SURGICLOSE® IS INDICATED FOR THE MANAGEMENT OF WOUNDS INCLUDING: PARTIAL AND FULL THICKNESS WOUNDS, PRESSURE ULCERS, CHRONIC VASCULAR ULCERS, DIABETIC ULCERS, TRAUMA WOUNDS (ABRASIONS, LACERATIONS, SECOND-DEGREE BURNS, SKIN TEARS), SURGICAL WOUNDS (DONOR SITE/GRAFTS, POST-MOHS SURGERY, POST-LASER SURGERY, PODIATRIC, WOUND DEHISCENCE) AND DRAINING WOUNDS.IFU: HTTPS://WWW.KERECIS.COM/IFUS/IFU-KERECIS-SURGICLOSE/
Type: IMPLANTABLE DEVICE | Site: FOOT | Status: FUNCTIONAL
Brand: KERECIS® SURGICLOSE®

## 2025-06-16 RX ORDER — BUPIVACAINE HCL/EPINEPHRINE 0.25-.0005
VIAL (ML) INJECTION PRN
Status: DISCONTINUED | OUTPATIENT
Start: 2025-06-16 | End: 2025-06-16 | Stop reason: ALTCHOICE

## 2025-06-16 ASSESSMENT — PAIN - FUNCTIONAL ASSESSMENT
PAIN_FUNCTIONAL_ASSESSMENT: 0-10

## 2025-06-16 NOTE — BRIEF OP NOTE
Brief Postoperative Note      Patient: Milton Hughes  YOB: 1970  MRN: [id number]    Date of Procedure: 6/16/2025    Pre op dx: open     Post-Op Diagnosis: Same       Procedure(s):  Debridement of right foot wound with application of acellular graft    Surgeon(s):  Lay Daley DPM    Assistant:  * No surgical staff found *    Anesthesia: Local    Estimated Blood Loss (mL): Minimal    Complications: None    Specimens:   ID Type Source Tests Collected by Time Destination   A : culture right foot wound Swab Foot CULTURE, WOUND (WITH GRAM STAIN) Lay Daley DPM 6/16/2025 1537    B : culture right foot wound Swab Foot CULTURE, ANAEROBIC Lay Daley DPM 6/16/2025 1537        Implants:  Implant Name Type Inv. Item Serial No.  Lot No. LRB No. Used Action   GRAFT FISH-SKIN 7X10CM SURGICLOSE - [id number]  GRAFT FISH-SKIN 7X10CM SURGICLOSE  [facility] [id number] Right 1 Implanted         Drains: * No LDAs found *    Findings:  Infection Present At Time Of Surgery (PATOS) (choose all levels that have infection present):  - Superficial Infection (skin/subcutaneous) present as evidenced by phlegmon  Other Findings:     Electronically signed by Lay Daley DPM on 6/16/2025 at 4:03 PM

## 2025-06-16 NOTE — OP NOTE
wound matrix was placed and packed into the wound and anchored using Steri-Strips followed by the application of Hydrogel followed by Adaptic, which was also anchored with Steri-Strips and covered with gauze, Kerlix, and Coban.    No product was wasted.     The patient appeared to tolerate anesthesia and procedure well, was transferred from the operating room to recovery with all vitals stable.    The patient has these dressings intact and will have followup visit in wound clinic in 1 week for dressing changes.        LAUREN KITCHEN DPM      SCF/AQS  D:  06/16/2025 16:10:00  T:  06/16/2025 16:38:31  JOB #:  [id number]

## 2025-06-16 NOTE — PERIOP NOTE
Milton Ellen  1970  [id number]    Situation:  Verbal report given from: Faviola DE LA ROSA  Procedure: Procedure(s):  Debridement of right foot wound with application of acellular graft    Background:    Preoperative diagnosis: Foot infection [L08.9]    Postoperative diagnosis: * No post-op diagnosis entered *    :  Dr. Daley    Assistant(s): Circulator: Faviola Acevedo RN  Monitoring Nurse: Pankaj Crystal RN  Scrub Person First: Dianna Berger RN    Specimens:   ID Type Source Tests Collected by Time Destination   A : culture right foot wound Swab Foot CULTURE, WOUND (WITH GRAM STAIN) Lay Daley DPM 6/16/2025 1537    B :   Foot  Lay Daley DPM 6/16/2025 1554        Assessment:  Intra-procedure medications         Anesthesia gave intra-procedure sedation and medications, see anesthesia flow sheet     Intravenous fluids: LR@ KVO     Vital signs stable

## 2025-06-16 NOTE — H&P
History and Physical    Subjective:     Milton Hughes is a 54 y.o.  male who presents with a chronic wound dorsal lateral right foot. Onset of symptoms was gradual several weeks ago since that time.      Past Medical History:   Diagnosis Date    Asthma     prior to weight loss; currently not medicated for    DM (diabetes mellitus) ([facility]) 02/29/2016    Gangrene of right foot ([facility]) 12/04/2024    History of blood transfusion     History of MRSA infection     History of obstructive sleep apnea     prior to weight loss    Hypertension     per pt is resolved    Joint pain     hands    Right foot ulcer, with unspecified severity ([facility]) 11/29/2024    Uncontrolled type 2 diabetes mellitus with hyperglycemia ([facility]) 12/03/2024      Past Surgical History:   Procedure Laterality Date    CHEST SURGERY Left     \"had to go in and dig out PNA infection\"    CHOLECYSTECTOMY      FOOT DEBRIDEMENT Right 12/04/2024    RIGHT FOOT WOUND CLOSURE AND  DEBRIDEMENT, APPLICATION OF THERASKIN AND STRAVIX PL performed by Cecilio Salazar DPM at [facility] MAIN OR    FOOT DEBRIDEMENT Right 2/24/2025    DEBRIDEMENT OF WOUND, RIGHT FOOT performed by Lay Daley DPM at [facility] MAIN OR    TOE AMPUTATION Right 12/01/2024    RIGHT FIFTH TOE AMPUTATION AND INCISION AND DRAINAGE OF RIGHT FOOT performed by Cecilio Salazar DPM at [facility] MAIN OR     Family History   Problem Relation Age of Onset    Asthma Mother     Lung Cancer Mother         squamous small cell    Diabetes Father     Heart Disease Father     No Known Problems Sister       Social History     Tobacco Use    Smoking status: Former     Current packs/day: 1.00     Average packs/day: 1 pack/day for 34.5 years (34.5 ttl pk-yrs)     Types: Cigarettes     Start date: 1991    Smokeless tobacco: Never   Substance Use Topics    Alcohol use: No     Comment: \"couple of times\"       Prior to Admission medications    Medication Sig Start Date End Date Taking? Authorizing Provider 
1.803 m (5' 11\")   Wt 126.6 kg (279 lb)   SpO2 98%   BMI 38.91 kg/m²     General Appearance:    Alert, cooperative, no distress, appears stated age       Ears:    Normal external ear canals, both ears   Throat:   Lips, mucosa, and tongue normal; teeth and gums normal   Back:     Symmetric, no curvature, ROM normal   Lungs:     Clear to auscultation bilaterally, respirations unlabored   Chest Wall:    No tenderness or deformity    Heart:    Regular rate and rhythm, S1 and S2 normal, no murmur, rub   or gallop   Abdomen:     Soft, non-tender, bowel sounds active all four quadrants,     no masses, no organomegaly   Extremities:   Extremities normal, atraumatic, no cyanosis or edema       Skin:   Skin color, texture, turgor normal, no rashes or lesions   Neurologic:   CNII-XII intact, normal strength,  and reflexes     throughout   Lower Extremities: Palpable Pulses  Epicritic sensation decreased  Muscle strength, good  Previous amputation of the right toe and metatarsal  Dorsal , distal , lateral right foot is a full thickness granular wound with slough that measures 3.5x1x.2cm            Data Review:   No results found for this or any previous visit (from the past 24 hours).        Assessment:     Active Problems:    Right foot ulcer, with fat layer exposed (HCC)  Resolved Problems:    * No resolved hospital problems. *      Plan:   Scheduled for wound debridement and application of and acellular graft ( keracis wound matrix).  Discuss procedure and post op course.  To leave dressings intact and to follow with me in the wound clinic in 4-5 days for dressing changes

## 2025-06-16 NOTE — PERIOP NOTE
Patient meets discharge criteria.  Patient and wife provided with verbal and written discharge instructions.  Patient discharged by wheelchair with wife to transport home.

## 2025-06-17 ENCOUNTER — RESULTS FOLLOW-UP (OUTPATIENT)
Age: 55
End: 2025-06-17

## 2025-06-17 DIAGNOSIS — D69.6 LOW PLATELET COUNT: Primary | ICD-10-CM

## 2025-06-18 LAB
BACTERIA SPEC CULT: NORMAL
SERVICE CMNT-IMP: NORMAL

## 2025-06-19 LAB
BACTERIA SPEC CULT: ABNORMAL
BACTERIA SPEC CULT: ABNORMAL
GRAM STN SPEC: ABNORMAL
GRAM STN SPEC: ABNORMAL
SERVICE CMNT-IMP: ABNORMAL

## 2025-06-20 ENCOUNTER — HOSPITAL ENCOUNTER (OUTPATIENT)
Facility: HOSPITAL | Age: 55
Discharge: HOME | End: 2025-06-20
Payer: COMMERCIAL

## 2025-06-20 VITALS
SYSTOLIC BLOOD PRESSURE: 159 MMHG | TEMPERATURE: 97.6 F | RESPIRATION RATE: 18 BRPM | DIASTOLIC BLOOD PRESSURE: 82 MMHG | HEART RATE: 82 BPM

## 2025-06-20 DIAGNOSIS — L97.414 DIABETIC ULCER OF RIGHT MIDFOOT ASSOCIATED WITH TYPE 2 DIABETES MELLITUS, WITH NECROSIS OF BONE (HCC): Primary | ICD-10-CM

## 2025-06-20 DIAGNOSIS — E11.621 DIABETIC ULCER OF RIGHT MIDFOOT ASSOCIATED WITH TYPE 2 DIABETES MELLITUS, WITH NECROSIS OF BONE (HCC): Primary | ICD-10-CM

## 2025-06-20 PROCEDURE — 99213 OFFICE O/P EST LOW 20 MIN: CPT

## 2025-06-20 RX ORDER — LIDOCAINE HYDROCHLORIDE 40 MG/ML
SOLUTION TOPICAL PRN
OUTPATIENT
Start: 2025-06-20

## 2025-06-20 RX ORDER — CLOBETASOL PROPIONATE 0.5 MG/G
OINTMENT TOPICAL PRN
OUTPATIENT
Start: 2025-06-20

## 2025-06-20 RX ORDER — GENTAMICIN SULFATE 1 MG/G
OINTMENT TOPICAL PRN
OUTPATIENT
Start: 2025-06-20

## 2025-06-20 RX ORDER — LIDOCAINE HYDROCHLORIDE 20 MG/ML
JELLY TOPICAL PRN
OUTPATIENT
Start: 2025-06-20

## 2025-06-20 RX ORDER — BETAMETHASONE DIPROPIONATE 0.5 MG/G
CREAM TOPICAL PRN
OUTPATIENT
Start: 2025-06-20

## 2025-06-20 RX ORDER — NEOMYCIN/BACITRACIN/POLYMYXINB 3.5-400-5K
OINTMENT (GRAM) TOPICAL PRN
OUTPATIENT
Start: 2025-06-20

## 2025-06-20 RX ORDER — TRIAMCINOLONE ACETONIDE 1 MG/G
OINTMENT TOPICAL PRN
OUTPATIENT
Start: 2025-06-20

## 2025-06-20 RX ORDER — SILVER SULFADIAZINE 10 MG/G
CREAM TOPICAL PRN
OUTPATIENT
Start: 2025-06-20

## 2025-06-20 RX ORDER — MUPIROCIN 2 %
OINTMENT (GRAM) TOPICAL PRN
OUTPATIENT
Start: 2025-06-20

## 2025-06-20 RX ORDER — LIDOCAINE 50 MG/G
OINTMENT TOPICAL PRN
OUTPATIENT
Start: 2025-06-20

## 2025-06-20 RX ORDER — SODIUM CHLOR/HYPOCHLOROUS ACID 0.033 %
SOLUTION, IRRIGATION IRRIGATION PRN
OUTPATIENT
Start: 2025-06-20

## 2025-06-20 RX ORDER — BACITRACIN ZINC AND POLYMYXIN B SULFATE 500; 1000 [USP'U]/G; [USP'U]/G
OINTMENT TOPICAL PRN
OUTPATIENT
Start: 2025-06-20

## 2025-06-20 RX ORDER — GINSENG 100 MG
CAPSULE ORAL PRN
OUTPATIENT
Start: 2025-06-20

## 2025-06-20 RX ORDER — LIDOCAINE 40 MG/G
CREAM TOPICAL PRN
OUTPATIENT
Start: 2025-06-20

## 2025-06-20 NOTE — FLOWSHEET NOTE
06/20/25 0844   Wound 12/30/24 Foot Right # 1   Date First Assessed/Time First Assessed: 12/30/24 0935   Present on Original Admission: Yes  Wound Approximate Age at First Assessment (Weeks): 4 weeks  Primary Wound Type: Diabetic Ulcer  Location: Foot  Wound Location Orientation: Right  Wound Descr...   Wound Etiology Diabetic   Dressing Status Old drainage noted   Wound Cleansed Cleansed with saline   Offloading for Diabetic Foot Ulcers Offloading ordered   Wound Assessment   (Skin sub in place)   Drainage Amount Moderate (25-50%)   Drainage Description Serosanguinous   Odor None   Pain Assessment   Pain Assessment None - Denies Pain     BP (!) 159/82   Pulse 82   Temp 97.6 °F (36.4 °C) (Temporal)   Resp 18

## 2025-06-20 NOTE — FLOWSHEET NOTE
06/20/25 0847   Wound 12/30/24 Foot Right # 1   Date First Assessed/Time First Assessed: 12/30/24 0935   Present on Original Admission: Yes  Wound Approximate Age at First Assessment (Weeks): 4 weeks  Primary Wound Type: Diabetic Ulcer  Location: Foot  Wound Location Orientation: Right  Wound Descr...   Dressing Status New dressing applied   Dressing/Treatment   (Skin sub in place, Versatel, hydrogel, G, RG, Coban)

## 2025-06-27 ENCOUNTER — HOSPITAL ENCOUNTER (OUTPATIENT)
Facility: HOSPITAL | Age: 55
Discharge: HOME | End: 2025-06-27
Attending: SURGERY
Payer: COMMERCIAL

## 2025-06-27 VITALS
DIASTOLIC BLOOD PRESSURE: 82 MMHG | RESPIRATION RATE: 16 BRPM | TEMPERATURE: 98.2 F | HEART RATE: 77 BPM | SYSTOLIC BLOOD PRESSURE: 174 MMHG

## 2025-06-27 DIAGNOSIS — E11.621 DIABETIC ULCER OF RIGHT MIDFOOT ASSOCIATED WITH TYPE 2 DIABETES MELLITUS, WITH NECROSIS OF BONE (HCC): Primary | ICD-10-CM

## 2025-06-27 DIAGNOSIS — L97.414 DIABETIC ULCER OF RIGHT MIDFOOT ASSOCIATED WITH TYPE 2 DIABETES MELLITUS, WITH NECROSIS OF BONE (HCC): Primary | ICD-10-CM

## 2025-06-27 PROCEDURE — 99213 OFFICE O/P EST LOW 20 MIN: CPT

## 2025-06-27 RX ORDER — SODIUM CHLOR/HYPOCHLOROUS ACID 0.033 %
SOLUTION, IRRIGATION IRRIGATION PRN
OUTPATIENT
Start: 2025-06-27

## 2025-06-27 RX ORDER — CLOBETASOL PROPIONATE 0.5 MG/G
OINTMENT TOPICAL PRN
OUTPATIENT
Start: 2025-06-27

## 2025-06-27 RX ORDER — BACITRACIN ZINC AND POLYMYXIN B SULFATE 500; 1000 [USP'U]/G; [USP'U]/G
OINTMENT TOPICAL PRN
OUTPATIENT
Start: 2025-06-27

## 2025-06-27 RX ORDER — NEOMYCIN/BACITRACIN/POLYMYXINB 3.5-400-5K
OINTMENT (GRAM) TOPICAL PRN
OUTPATIENT
Start: 2025-06-27

## 2025-06-27 RX ORDER — BETAMETHASONE DIPROPIONATE 0.5 MG/G
CREAM TOPICAL PRN
OUTPATIENT
Start: 2025-06-27

## 2025-06-27 RX ORDER — MUPIROCIN 2 %
OINTMENT (GRAM) TOPICAL PRN
OUTPATIENT
Start: 2025-06-27

## 2025-06-27 RX ORDER — LIDOCAINE HYDROCHLORIDE 20 MG/ML
JELLY TOPICAL PRN
OUTPATIENT
Start: 2025-06-27

## 2025-06-27 RX ORDER — SILVER SULFADIAZINE 10 MG/G
CREAM TOPICAL PRN
OUTPATIENT
Start: 2025-06-27

## 2025-06-27 RX ORDER — GENTAMICIN SULFATE 1 MG/G
OINTMENT TOPICAL PRN
OUTPATIENT
Start: 2025-06-27

## 2025-06-27 RX ORDER — LIDOCAINE HYDROCHLORIDE 40 MG/ML
SOLUTION TOPICAL PRN
OUTPATIENT
Start: 2025-06-27

## 2025-06-27 RX ORDER — LIDOCAINE 40 MG/G
CREAM TOPICAL PRN
OUTPATIENT
Start: 2025-06-27

## 2025-06-27 RX ORDER — LIDOCAINE 50 MG/G
OINTMENT TOPICAL PRN
OUTPATIENT
Start: 2025-06-27

## 2025-06-27 RX ORDER — GINSENG 100 MG
CAPSULE ORAL PRN
OUTPATIENT
Start: 2025-06-27

## 2025-06-27 RX ORDER — TRIAMCINOLONE ACETONIDE 1 MG/G
OINTMENT TOPICAL PRN
OUTPATIENT
Start: 2025-06-27

## 2025-06-27 ASSESSMENT — PAIN SCALES - GENERAL: PAINLEVEL_OUTOF10: 0

## 2025-06-27 NOTE — FLOWSHEET NOTE
06/27/25 1448   RLE Neurovascular Assessment   Capillary Refill Less than/Equal to 3 seconds   Color Appropriate for Ethnicity   Temperature Warm   R Pedal Pulse +3   Wound 12/30/24 Foot Right # 1   Date First Assessed/Time First Assessed: 12/30/24 0935   Present on Original Admission: Yes  Wound Approximate Age at First Assessment (Weeks): 4 weeks  Primary Wound Type: Diabetic Ulcer  Location: Foot  Wound Location Orientation: Right  Wound Descr...   Wound Etiology Diabetic   Dressing Status Old drainage noted   Wound Assessment Other (Comment)  (skin sub in place-unable to visualize wound bed)   Drainage Amount Moderate (25-50%)   Drainage Description Serosanguinous   Odor Mild   Pain Assessment   Pain Assessment None - Denies Pain   Pain Level 0     BP (!) 174/82   Pulse 77   Temp 98.2 °F (36.8 °C) (Temporal)   Resp 16

## 2025-06-27 NOTE — WOUND CARE
[facility]   Progress Note and Procedure Note   NO Procedure      Milton Hughes  MEDICAL RECORD NUMBER:  [id number]  AGE: 54 y.o. RACE White (non-)  GENDER: male  : 1970  EPISODE DATE:  2025    Subjective:     Chief Complaint   Patient presents with    Wound Check       Post acellular graft right foot 2 weeks ago  HISTORY of PRESENT ILLNESS HPI    Milton Hughes is a 54 y.o. male who presents today for wound/ulcer evaluation.   Wound/Ulcer Pain Timing/Severity: none      Ulcer Identification:  Ulcer Type: diabetic and non-healing surgical    Contributing Factors: diabetes and obesity        PAST MEDICAL HISTORY    Past Medical History:   Diagnosis Date    Asthma     prior to weight loss; currently not medicated for    DM (diabetes mellitus) ([facility]) 2016    Gangrene of right foot ([facility]) 2024    History of blood transfusion     History of MRSA infection     History of obstructive sleep apnea     prior to weight loss    Hypertension     per pt is resolved    Joint pain     hands    Right foot ulcer, with unspecified severity ([facility]) 2024    Uncontrolled type 2 diabetes mellitus with hyperglycemia ([facility]) 2024        PAST SURGICAL HISTORY    Past Surgical History:   Procedure Laterality Date    CHEST SURGERY Left     \"had to go in and dig out PNA infection\"    CHOLECYSTECTOMY      FOOT DEBRIDEMENT Right 2024    RIGHT FOOT WOUND CLOSURE AND  DEBRIDEMENT, APPLICATION OF THERASKIN AND STRAVIX PL performed by Cecilio Salazar DPM at [facility] MAIN OR    FOOT DEBRIDEMENT Right 2025    DEBRIDEMENT OF WOUND, RIGHT FOOT performed by Lay Daley DPM at [facility] MAIN OR    FOOT DEBRIDEMENT Right 2025    Debridement of right foot wound with application of acellular graft performed by Lay Daley DPM at [facility] MAIN OR    TOE AMPUTATION Right 2024    RIGHT FIFTH TOE AMPUTATION AND INCISION AND DRAINAGE OF RIGHT FOOT performed by Marie

## 2025-06-27 NOTE — PATIENT INSTRUCTIONS
Discharge Instructions for  [facility]  [address]  [place] [address]  Phone: [phone] Fax: [phone]    NAME:  Milton Hughes  YOB: 1970  MEDICAL RECORD NUMBER:  [id number]  DATE:  June 27, 2025      WOUND CARE ORDERS:  Right foot wound :Cleanse with soap and water , apply primary dressing Collagen and Versatel cover with secondary dressing Border Foam and Gauze .  Apply Single tubi  Pt./pcg/HH nurse to change (freq) Once a week  and as needed for compromise.F/U in 2 week.     Home health - none  Activity:  [] Elevate leg(s) above the level of the heart when sitting.  [] Avoid prolonged standing in one place.   [] Do no get dressing/wrap wet.       Dietary:  [] Diet as tolerated      [] Diabetic Diet            [] Increase Protein: examples (Meat, cheese, eggs, greek yogurt, fish, nuts)          [] Alin Therapeutic Nutrition Powder  [] Other:        Return Appointment:  [x] Return Appointment: With Dr. Lay Daley in 2 Week(s)  [] Nurse Visit :   [] Ordered tests:      Electronically signed Aida Longoria RN on 6/27/2025 at 2:41 PM     Wound Care Center Information: Should you experience any significant changes in your wound(s) or have questions about your wound care, please contact the [facility] at MONDAY - FRIDAY 8:00 am - 4:30.  If you need help with your wound outside these hours and cannot wait until we are again available, contact your PCP or go to the hospital emergency room.     PLEASE NOTE: IF YOU ARE UNABLE TO OBTAIN WOUND SUPPLIES, CONTINUE TO USE THE SUPPLIES YOU HAVE AVAILABLE UNTIL YOU ARE ABLE TO REACH US. IT IS MOST IMPORTANT TO KEEP THE WOUND COVERED AT ALL TIMES.     Physician Signature:_______________________    Date: ___________ Time:  ____________

## 2025-07-07 DIAGNOSIS — E11.59 TYPE 2 DIABETES MELLITUS WITH OTHER CIRCULATORY COMPLICATION, WITH LONG-TERM CURRENT USE OF INSULIN (HCC): Chronic | ICD-10-CM

## 2025-07-07 DIAGNOSIS — Z79.4 TYPE 2 DIABETES MELLITUS WITH OTHER CIRCULATORY COMPLICATION, WITH LONG-TERM CURRENT USE OF INSULIN (HCC): Chronic | ICD-10-CM

## 2025-07-07 RX ORDER — INSULIN GLARGINE 100 [IU]/ML
INJECTION, SOLUTION SUBCUTANEOUS
Qty: 10 ML | Refills: 0 | Status: SHIPPED | OUTPATIENT
Start: 2025-07-07

## 2025-07-08 DIAGNOSIS — Z79.4 TYPE 2 DIABETES MELLITUS WITH OTHER CIRCULATORY COMPLICATION, WITH LONG-TERM CURRENT USE OF INSULIN (HCC): Chronic | ICD-10-CM

## 2025-07-08 DIAGNOSIS — E11.59 TYPE 2 DIABETES MELLITUS WITH OTHER CIRCULATORY COMPLICATION, WITH LONG-TERM CURRENT USE OF INSULIN (HCC): Chronic | ICD-10-CM

## 2025-07-10 RX ORDER — INSULIN ASPART 100 [IU]/ML
INJECTION, SOLUTION INTRAVENOUS; SUBCUTANEOUS
Qty: 10 ML | Refills: 3 | Status: SHIPPED | OUTPATIENT
Start: 2025-07-10

## 2025-07-11 ENCOUNTER — HOSPITAL ENCOUNTER (OUTPATIENT)
Facility: HOSPITAL | Age: 55
Discharge: HOME | End: 2025-07-11
Attending: SURGERY
Payer: COMMERCIAL

## 2025-07-11 VITALS
DIASTOLIC BLOOD PRESSURE: 94 MMHG | HEART RATE: 87 BPM | SYSTOLIC BLOOD PRESSURE: 136 MMHG | RESPIRATION RATE: 16 BRPM | TEMPERATURE: 97.9 F

## 2025-07-11 DIAGNOSIS — L97.414 DIABETIC ULCER OF RIGHT MIDFOOT ASSOCIATED WITH TYPE 2 DIABETES MELLITUS, WITH NECROSIS OF BONE (HCC): Primary | ICD-10-CM

## 2025-07-11 DIAGNOSIS — E11.621 DIABETIC ULCER OF RIGHT MIDFOOT ASSOCIATED WITH TYPE 2 DIABETES MELLITUS, WITH NECROSIS OF BONE (HCC): Primary | ICD-10-CM

## 2025-07-11 PROCEDURE — 99213 OFFICE O/P EST LOW 20 MIN: CPT

## 2025-07-11 RX ORDER — TRIAMCINOLONE ACETONIDE 1 MG/G
OINTMENT TOPICAL PRN
OUTPATIENT
Start: 2025-07-11

## 2025-07-11 RX ORDER — SILVER SULFADIAZINE 10 MG/G
CREAM TOPICAL PRN
OUTPATIENT
Start: 2025-07-11

## 2025-07-11 RX ORDER — LIDOCAINE HYDROCHLORIDE 20 MG/ML
JELLY TOPICAL PRN
OUTPATIENT
Start: 2025-07-11

## 2025-07-11 RX ORDER — LIDOCAINE 50 MG/G
OINTMENT TOPICAL PRN
OUTPATIENT
Start: 2025-07-11

## 2025-07-11 RX ORDER — GINSENG 100 MG
CAPSULE ORAL PRN
OUTPATIENT
Start: 2025-07-11

## 2025-07-11 RX ORDER — GENTAMICIN SULFATE 1 MG/G
OINTMENT TOPICAL PRN
OUTPATIENT
Start: 2025-07-11

## 2025-07-11 RX ORDER — LIDOCAINE 40 MG/G
CREAM TOPICAL PRN
OUTPATIENT
Start: 2025-07-11

## 2025-07-11 RX ORDER — SODIUM CHLOR/HYPOCHLOROUS ACID 0.033 %
SOLUTION, IRRIGATION IRRIGATION PRN
OUTPATIENT
Start: 2025-07-11

## 2025-07-11 RX ORDER — LIDOCAINE HYDROCHLORIDE 40 MG/ML
SOLUTION TOPICAL PRN
OUTPATIENT
Start: 2025-07-11

## 2025-07-11 RX ORDER — NEOMYCIN/BACITRACIN/POLYMYXINB 3.5-400-5K
OINTMENT (GRAM) TOPICAL PRN
OUTPATIENT
Start: 2025-07-11

## 2025-07-11 RX ORDER — CLOBETASOL PROPIONATE 0.5 MG/G
OINTMENT TOPICAL PRN
OUTPATIENT
Start: 2025-07-11

## 2025-07-11 RX ORDER — MUPIROCIN 2 %
OINTMENT (GRAM) TOPICAL PRN
OUTPATIENT
Start: 2025-07-11

## 2025-07-11 RX ORDER — BACITRACIN ZINC AND POLYMYXIN B SULFATE 500; 1000 [USP'U]/G; [USP'U]/G
OINTMENT TOPICAL PRN
OUTPATIENT
Start: 2025-07-11

## 2025-07-11 RX ORDER — BETAMETHASONE DIPROPIONATE 0.5 MG/G
CREAM TOPICAL PRN
OUTPATIENT
Start: 2025-07-11

## 2025-07-11 ASSESSMENT — PAIN SCALES - GENERAL: PAINLEVEL_OUTOF10: 2

## 2025-07-11 ASSESSMENT — PAIN DESCRIPTION - ORIENTATION: ORIENTATION: RIGHT

## 2025-07-11 ASSESSMENT — PAIN DESCRIPTION - LOCATION: LOCATION: LEG;FOOT

## 2025-07-11 ASSESSMENT — PAIN DESCRIPTION - DESCRIPTORS: DESCRIPTORS: ACHING;DISCOMFORT

## 2025-07-11 NOTE — WOUND CARE
[facility]   Progress Note and Procedure Note   NO Procedure      Milton Hughes  MEDICAL RECORD NUMBER:  [id number]  AGE: 54 y.o. RACE White (non-)  GENDER: male  : 1970  EPISODE DATE:  2025    Subjective:     Chief Complaint   Patient presents with    Wound Check     RLL         HISTORY of PRESENT ILLNESS HPI    Milton Hughes is a 54 y.o. male who presents today for wound/ulcer evaluation.   Wound/Ulcer Pain Timing/Severity: none          Ulcer Identification:  Ulcer Type: diabetic and non-healing surgical    Contributing Factors: diabetes, poor glucose control, and obesity        PAST MEDICAL HISTORY    Past Medical History:   Diagnosis Date    Asthma     prior to weight loss; currently not medicated for    DM (diabetes mellitus) ([facility]) 2016    Gangrene of right foot ([facility]) 2024    History of blood transfusion     History of MRSA infection     History of obstructive sleep apnea     prior to weight loss    Hypertension     per pt is resolved    Joint pain     hands    Right foot ulcer, with unspecified severity ([facility]) 2024    Uncontrolled type 2 diabetes mellitus with hyperglycemia ([facility]) 2024        PAST SURGICAL HISTORY    Past Surgical History:   Procedure Laterality Date    CHEST SURGERY Left     \"had to go in and dig out PNA infection\"    CHOLECYSTECTOMY      FOOT DEBRIDEMENT Right 2024    RIGHT FOOT WOUND CLOSURE AND  DEBRIDEMENT, APPLICATION OF THERASKIN AND STRAVIX PL performed [facility] Cec[facility]azar DPM [facility] MAIN OR    FOOT DEBRIDEMENT Right 2025    DEBRIDEMENT OF WOUND, RIGHT FOOT performed by Lay Daley DPM at hospitals MAIN OR    FOOT DEBRIDEMENT Right 2025    Debridement of right foot wound with application of acellular graft performed by Lay Daley DPM at [facility] MAIN OR    TOE AMPUTATION Right 2024    RIGHT FIFTH TOE AMPUTATION AND INCISION AND DRAINAGE OF RIGHT FOOT performed by Cecilio Salazar

## 2025-07-11 NOTE — FLOWSHEET NOTE
07/11/25 1458   Right Leg Edema Point of Measurement   Compression Therapy Tubular elastic support bandage   RLE Neurovascular Assessment   Capillary Refill Less than/Equal to 3 seconds   Color Appropriate for Ethnicity   Temperature Warm   R Pedal Pulse +3   Wound 12/30/24 Foot Right # 1   Date First Assessed/Time First Assessed: 12/30/24 0935   Present on Original Admission: Yes  Wound Approximate Age at First Assessment (Weeks): 4 weeks  Primary Wound Type: Diabetic Ulcer  Location: Foot  Wound Location Orientation: Right  Wound Descr...   Wound Image    Wound Etiology Diabetic   Dressing Status Old drainage noted   Wound Cleansed Cleansed with saline   Offloading for Diabetic Foot Ulcers Offloading not ordered   Wound Length (cm) 3 cm   Wound Width (cm) 0.6 cm   Wound Depth (cm) 0.2 cm   Wound Surface Area (cm^2) 1.8 cm^2   Change in Wound Size % (l*w) 96.88   Wound Volume (cm^3) 0.36 cm^3   Wound Healing % 99   Wound Assessment Pink/red   Drainage Amount Moderate (25-50%)   Drainage Description Serosanguinous   Odor None   Haily-wound Assessment Maceration;Fragile   Margins Defined edges   Wound Thickness Description not for Pressure Injury Full thickness   Pain Assessment   Pain Assessment 0-10   Pain Level 2   Pain Location Leg;Foot   Pain Orientation Right   Pain Descriptors Aching;Discomfort     BP (!) 136/94   Pulse 87   Temp 97.9 °F (36.6 °C) (Temporal)   Resp 16

## 2025-07-11 NOTE — FLOWSHEET NOTE
07/11/25 1534   Wound 12/30/24 Foot Right # 1   Date First Assessed/Time First Assessed: 12/30/24 0935   Present on Original Admission: Yes  Wound Approximate Age at First Assessment (Weeks): 4 weeks  Primary Wound Type: Diabetic Ulcer  Location: Foot  Wound Location Orientation: Right  Wound Descr...   Dressing Status New dressing applied   Dressing/Treatment Betadine swabs/povidone iodine;Coban/self-adherent bandages;Gauze dressing/dressing sponge

## 2025-07-11 NOTE — PATIENT INSTRUCTIONS
Discharge Instructions for  [facility]  [address]  [address]  [place] [address]  Phone: [phone] Fax: [phone]    NAME:  Milton Hughes  YOB: 1970  MEDICAL RECORD NUMBER:  [id number]  DATE:  July 11, 2025      WOUND CARE ORDERS:  Right foot wound :Cleanse with soap and water , apply betadine moist packing to wound. Cover with gauze then coban. Change dressing every other day and as needed for compromise.    Right pretibial wound - Cleanse with soap and water , Apply medihoney to wound. Cover with silicone foam border. Change dressing every other  day and as needed for compromise.    Home health - none  Activity:  [] Elevate leg(s) above the level of the heart when sitting.  [] Avoid prolonged standing in one place.   [] Do no get dressing/wrap wet.       Dietary:  [] Diet as tolerated      [] Diabetic Diet            [] Increase Protein: examples (Meat, cheese, eggs, greek yogurt, fish, nuts)          [] Alin Therapeutic Nutrition Powder  [] Other:        Return Appointment:  [x] Return Appointment: With Dr. Lay Daley in 2 Week(s)  [] Nurse Visit :   [] Ordered tests:      Electronically signed Carmen Rust RN on 7/11/2025 at 1:10 PM     Wound Care Center Information: Should you experience any significant changes in your wound(s) or have questions about your wound care, please contact the [facility] at MONDAY - FRIDAY 8:00 am - 4:30.  If you need help with your wound outside these hours and cannot wait until we are again available, contact your PCP or go to the hospital emergency room.     PLEASE NOTE: IF YOU ARE UNABLE TO OBTAIN WOUND SUPPLIES, CONTINUE TO USE THE SUPPLIES YOU HAVE AVAILABLE UNTIL YOU ARE ABLE TO REACH US. IT IS MOST IMPORTANT TO KEEP THE WOUND COVERED AT ALL TIMES.     Physician Signature:_______________________    Date: ___________ Time:  ____________

## 2025-07-25 ENCOUNTER — HOSPITAL ENCOUNTER (OUTPATIENT)
Facility: HOSPITAL | Age: 55
Discharge: HOME | End: 2025-07-25
Attending: SURGERY
Payer: COMMERCIAL

## 2025-07-25 VITALS
HEART RATE: 92 BPM | TEMPERATURE: 98.8 F | SYSTOLIC BLOOD PRESSURE: 150 MMHG | DIASTOLIC BLOOD PRESSURE: 78 MMHG | RESPIRATION RATE: 18 BRPM

## 2025-07-25 DIAGNOSIS — E11.621 DIABETIC ULCER OF RIGHT MIDFOOT ASSOCIATED WITH TYPE 2 DIABETES MELLITUS, WITH NECROSIS OF BONE (HCC): Primary | ICD-10-CM

## 2025-07-25 DIAGNOSIS — L97.414 DIABETIC ULCER OF RIGHT MIDFOOT ASSOCIATED WITH TYPE 2 DIABETES MELLITUS, WITH NECROSIS OF BONE (HCC): Primary | ICD-10-CM

## 2025-07-25 PROCEDURE — 11042 DBRDMT SUBQ TIS 1ST 20SQCM/<: CPT

## 2025-07-25 RX ORDER — NEOMYCIN/BACITRACIN/POLYMYXINB 3.5-400-5K
OINTMENT (GRAM) TOPICAL PRN
OUTPATIENT
Start: 2025-07-25

## 2025-07-25 RX ORDER — LIDOCAINE HYDROCHLORIDE 20 MG/ML
JELLY TOPICAL PRN
OUTPATIENT
Start: 2025-07-25

## 2025-07-25 RX ORDER — GENTAMICIN SULFATE 1 MG/G
OINTMENT TOPICAL PRN
OUTPATIENT
Start: 2025-07-25

## 2025-07-25 RX ORDER — BETAMETHASONE DIPROPIONATE 0.5 MG/G
CREAM TOPICAL PRN
OUTPATIENT
Start: 2025-07-25

## 2025-07-25 RX ORDER — BACITRACIN ZINC AND POLYMYXIN B SULFATE 500; 1000 [USP'U]/G; [USP'U]/G
OINTMENT TOPICAL PRN
OUTPATIENT
Start: 2025-07-25

## 2025-07-25 RX ORDER — LIDOCAINE 40 MG/G
CREAM TOPICAL PRN
OUTPATIENT
Start: 2025-07-25

## 2025-07-25 RX ORDER — TRIAMCINOLONE ACETONIDE 1 MG/G
OINTMENT TOPICAL PRN
OUTPATIENT
Start: 2025-07-25

## 2025-07-25 RX ORDER — SODIUM CHLOR/HYPOCHLOROUS ACID 0.033 %
SOLUTION, IRRIGATION IRRIGATION PRN
OUTPATIENT
Start: 2025-07-25

## 2025-07-25 RX ORDER — LIDOCAINE 50 MG/G
OINTMENT TOPICAL PRN
OUTPATIENT
Start: 2025-07-25

## 2025-07-25 RX ORDER — MUPIROCIN 2 %
OINTMENT (GRAM) TOPICAL PRN
OUTPATIENT
Start: 2025-07-25

## 2025-07-25 RX ORDER — CLOBETASOL PROPIONATE 0.5 MG/G
OINTMENT TOPICAL PRN
OUTPATIENT
Start: 2025-07-25

## 2025-07-25 RX ORDER — LIDOCAINE HYDROCHLORIDE 40 MG/ML
SOLUTION TOPICAL PRN
OUTPATIENT
Start: 2025-07-25

## 2025-07-25 RX ORDER — SILVER SULFADIAZINE 10 MG/G
CREAM TOPICAL PRN
OUTPATIENT
Start: 2025-07-25

## 2025-07-25 RX ORDER — GINSENG 100 MG
CAPSULE ORAL PRN
OUTPATIENT
Start: 2025-07-25

## 2025-07-25 NOTE — WOUND CARE
Debridement Wound Care        Problem List Items Addressed This Visit          Endocrine    Diabetic ulcer of right midfoot associated with type 2 diabetes mellitus, with necrosis of bone (HCC) - Primary    Relevant Orders    MD COMMUNICATION 1    MD COMMUNICATION 2    Initiate Outpatient Wound Care Protocol       Procedure Note  Indications:  Based on my examination of this patient's wound(s)/ulcer(s) today, debridement is  required to promote healing and evaluate the wound base.    Performed by: Lay Daley DPM    Consent obtained: Yes    Time out taken: Yes    Debridement: excisional    Using curette the wound(s)/ulcer(s) was/were sharply debrided down through and including the removal of    subcutaneous tissue    Devitalized Tissue Debrided: fibrin, biofilm, and necrotic/eschar    Pre Debridement Measurements:  Are located in the Wound/Ulcer Documentation Flow Sheet    Diabetic ulcer, fat layer exposed    Wound/Ulcer #: 1    Post Debridement Measurements:  Wound/Ulcer Descriptions are Pre Debridement except measurements:    Wound Care Documentation:  Wound 12/30/24 Foot Right # 1 (Active)   Wound Image   07/25/25 1315   Wound Etiology Diabetic 07/25/25 1315   Dressing Status Old drainage noted 07/25/25 1315   Wound Cleansed Cleansed with saline 07/25/25 1315   Dressing/Treatment Collagen;Hydrating gel;Gauze dressing/dressing sponge 07/25/25 1342   Offloading for Diabetic Foot Ulcers Offloading not ordered 07/25/25 1315   Wound Length (cm) 2.8 cm 07/25/25 1315   Wound Width (cm) 0.5 cm 07/25/25 1342   Wound Depth (cm) 0.2 cm 07/25/25 1315   Wound Surface Area (cm^2) 1.68 cm^2 07/25/25 1315   Change in Wound Size % (l*w) 97.08 07/25/25 1315   Wound Volume (cm^3) 0.336 cm^3 07/25/25 1315   Wound Healing % 99 07/25/25 1315   Post-Procedure Length (cm) 3.3 cm 07/25/25 1342   Post-Procedure Width (cm) 0.5 cm 07/25/25 1342   Post-Procedure Depth (cm) 0.3 cm 07/25/25 1342   Post-Procedure Surface Area (cm^2) 1.65

## 2025-07-25 NOTE — PATIENT INSTRUCTIONS
Discharge Instructions for  [facility]  [address]  [address]  [place] [address]  Phone: [phone] Fax: [phone]    NAME:  Milton Hughes  YOB: 1970  MEDICAL RECORD NUMBER:  [id number]  DATE:  July 25, 2025      WOUND CARE ORDERS:  Right foot wound :Cleanse with soap and water , apply collagen and hydrogel. Cover with gauze, then coban.   We will order snap vac and call you for application.      Home health - none  Activity:  [] Elevate leg(s) above the level of the heart when sitting.  [] Avoid prolonged standing in one place.   [] Do no get dressing/wrap wet.       Dietary:  [] Diet as tolerated      [] Diabetic Diet            [] Increase Protein: examples (Meat, cheese, eggs, greek yogurt, fish, nuts)          [] Alin Therapeutic Nutrition Powder  [] Other:        Return Appointment:  [x] Return Appointment: With Dr. Lay Daley in 3 Week(s)  [] Nurse Visit :   [] Ordered tests:      Electronically signed Carmen Rust RN on 7/25/2025 at 1:13 PM     Wound Care Center Information: Should you experience any significant changes in your wound(s) or have questions about your wound care, please contact the [facility] at MONDAY - FRIDAY 8:00 am - 4:30.  If you need help with your wound outside these hours and cannot wait until we are again available, contact your PCP or go to the hospital emergency room.     PLEASE NOTE: IF YOU ARE UNABLE TO OBTAIN WOUND SUPPLIES, CONTINUE TO USE THE SUPPLIES YOU HAVE AVAILABLE UNTIL YOU ARE ABLE TO REACH US. IT IS MOST IMPORTANT TO KEEP THE WOUND COVERED AT ALL TIMES.     Physician Signature:_______________________    Date: ___________ Time:  ____________

## 2025-07-25 NOTE — FLOWSHEET NOTE
07/25/25 1315   RLE Neurovascular Assessment   Capillary Refill Less than/Equal to 3 seconds   Color Appropriate for Ethnicity   Temperature Warm   R Pedal Pulse +3   Wound 12/30/24 Foot Right # 1   Date First Assessed/Time First Assessed: 12/30/24 0935   Present on Original Admission: Yes  Wound Approximate Age at First Assessment (Weeks): 4 weeks  Primary Wound Type: Diabetic Ulcer  Location: Foot  Wound Location Orientation: Right  Wound Descr...   Wound Image    Wound Etiology Diabetic   Dressing Status Old drainage noted   Wound Cleansed Cleansed with saline   Offloading for Diabetic Foot Ulcers Offloading not ordered   Wound Length (cm) 2.8 cm   Wound Width (cm) 0.6 cm   Wound Depth (cm) 0.2 cm   Wound Surface Area (cm^2) 1.68 cm^2   Change in Wound Size % (l*w) 97.08   Wound Volume (cm^3) 0.336 cm^3   Wound Healing % 99   Wound Assessment Pink/red   Drainage Amount Moderate (25-50%)   Drainage Description Serosanguinous   Odor None   Haily-wound Assessment Fragile   Margins Defined edges   Wound Thickness Description not for Pressure Injury Full thickness   Pain Assessment   Pain Assessment None - Denies Pain     BP (!) 150/78   Pulse 92   Temp 98.8 °F (37.1 °C) (Temporal)   Resp 18

## 2025-07-25 NOTE — FLOWSHEET NOTE
07/25/25 1342   Wound 12/30/24 Foot Right # 1   Date First Assessed/Time First Assessed: 12/30/24 0935   Present on Original Admission: Yes  Wound Approximate Age at First Assessment (Weeks): 4 weeks  Primary Wound Type: Diabetic Ulcer  Location: Foot  Wound Location Orientation: Right  Wound Descr...   Dressing/Treatment Collagen;Hydrating gel;Gauze dressing/dressing sponge  (coban)   Wound Width (cm) 0.5 cm   Post-Procedure Length (cm) 3.3 cm   Post-Procedure Width (cm) 0.5 cm   Post-Procedure Depth (cm) 0.3 cm   Post-Procedure Surface Area (cm^2) 1.65 cm^2   Post-Procedure Volume (cm^3) 0.495 cm^3

## 2025-07-25 NOTE — FLOWSHEET NOTE
07/25/25 1342   Wound 12/30/24 Foot Right # 1   Date First Assessed/Time First Assessed: 12/30/24 0935   Present on Original Admission: Yes  Wound Approximate Age at First Assessment (Weeks): 4 weeks  Primary Wound Type: Diabetic Ulcer  Location: Foot  Wound Location Orientation: Right  Wound Descr...   Wound Width (cm) 0.5 cm   Post-Procedure Length (cm) 3.3 cm   Post-Procedure Width (cm) 0.5 cm   Post-Procedure Depth (cm) 0.3 cm   Post-Procedure Surface Area (cm^2) 1.65 cm^2   Post-Procedure Volume (cm^3) 0.495 cm^3

## 2025-08-03 DIAGNOSIS — E11.59 TYPE 2 DIABETES MELLITUS WITH OTHER CIRCULATORY COMPLICATION, WITH LONG-TERM CURRENT USE OF INSULIN (HCC): Chronic | ICD-10-CM

## 2025-08-03 DIAGNOSIS — Z79.4 TYPE 2 DIABETES MELLITUS WITH OTHER CIRCULATORY COMPLICATION, WITH LONG-TERM CURRENT USE OF INSULIN (HCC): Chronic | ICD-10-CM

## 2025-08-04 RX ORDER — INSULIN GLARGINE 100 [IU]/ML
40 INJECTION, SOLUTION SUBCUTANEOUS DAILY
Qty: 10 ML | Refills: 3 | Status: SHIPPED | OUTPATIENT
Start: 2025-08-04

## 2025-08-15 ENCOUNTER — HOSPITAL ENCOUNTER (OUTPATIENT)
Facility: HOSPITAL | Age: 55
Discharge: HOME | End: 2025-08-15
Attending: SURGERY
Payer: COMMERCIAL

## 2025-08-15 VITALS
SYSTOLIC BLOOD PRESSURE: 151 MMHG | TEMPERATURE: 97.4 F | DIASTOLIC BLOOD PRESSURE: 75 MMHG | RESPIRATION RATE: 16 BRPM | HEART RATE: 74 BPM

## 2025-08-15 DIAGNOSIS — L97.414 DIABETIC ULCER OF RIGHT MIDFOOT ASSOCIATED WITH TYPE 2 DIABETES MELLITUS, WITH NECROSIS OF BONE (HCC): Primary | ICD-10-CM

## 2025-08-15 DIAGNOSIS — E11.621 DIABETIC ULCER OF RIGHT MIDFOOT ASSOCIATED WITH TYPE 2 DIABETES MELLITUS, WITH NECROSIS OF BONE (HCC): Primary | ICD-10-CM

## 2025-08-15 PROCEDURE — 11042 DBRDMT SUBQ TIS 1ST 20SQCM/<: CPT

## 2025-08-15 RX ORDER — BETAMETHASONE DIPROPIONATE 0.5 MG/G
CREAM TOPICAL PRN
OUTPATIENT
Start: 2025-08-15

## 2025-08-15 RX ORDER — LIDOCAINE HYDROCHLORIDE 20 MG/ML
JELLY TOPICAL PRN
OUTPATIENT
Start: 2025-08-15

## 2025-08-15 RX ORDER — SILVER SULFADIAZINE 10 MG/G
CREAM TOPICAL PRN
OUTPATIENT
Start: 2025-08-15

## 2025-08-15 RX ORDER — LIDOCAINE 40 MG/G
CREAM TOPICAL PRN
OUTPATIENT
Start: 2025-08-15

## 2025-08-15 RX ORDER — LIDOCAINE HYDROCHLORIDE 40 MG/ML
SOLUTION TOPICAL PRN
OUTPATIENT
Start: 2025-08-15

## 2025-08-15 RX ORDER — BACITRACIN ZINC AND POLYMYXIN B SULFATE 500; 1000 [USP'U]/G; [USP'U]/G
OINTMENT TOPICAL PRN
OUTPATIENT
Start: 2025-08-15

## 2025-08-15 RX ORDER — NEOMYCIN/BACITRACIN/POLYMYXINB 3.5-400-5K
OINTMENT (GRAM) TOPICAL PRN
OUTPATIENT
Start: 2025-08-15

## 2025-08-15 RX ORDER — SODIUM CHLOR/HYPOCHLOROUS ACID 0.033 %
SOLUTION, IRRIGATION IRRIGATION PRN
OUTPATIENT
Start: 2025-08-15

## 2025-08-15 RX ORDER — MUPIROCIN 2 %
OINTMENT (GRAM) TOPICAL PRN
OUTPATIENT
Start: 2025-08-15

## 2025-08-15 RX ORDER — GINSENG 100 MG
CAPSULE ORAL PRN
OUTPATIENT
Start: 2025-08-15

## 2025-08-15 RX ORDER — CLOBETASOL PROPIONATE 0.5 MG/G
OINTMENT TOPICAL PRN
OUTPATIENT
Start: 2025-08-15

## 2025-08-15 RX ORDER — TRIAMCINOLONE ACETONIDE 1 MG/G
OINTMENT TOPICAL PRN
OUTPATIENT
Start: 2025-08-15

## 2025-08-15 RX ORDER — LIDOCAINE 50 MG/G
OINTMENT TOPICAL PRN
OUTPATIENT
Start: 2025-08-15

## 2025-08-15 RX ORDER — GENTAMICIN SULFATE 1 MG/G
OINTMENT TOPICAL PRN
OUTPATIENT
Start: 2025-08-15

## 2025-08-15 ASSESSMENT — PAIN DESCRIPTION - PAIN TYPE: TYPE: CHRONIC PAIN

## 2025-08-15 ASSESSMENT — PAIN DESCRIPTION - ORIENTATION: ORIENTATION: RIGHT

## 2025-08-15 ASSESSMENT — PAIN SCALES - GENERAL: PAINLEVEL_OUTOF10: 4

## 2025-08-15 ASSESSMENT — PAIN DESCRIPTION - LOCATION: LOCATION: FOOT

## 2025-08-15 ASSESSMENT — PAIN - FUNCTIONAL ASSESSMENT: PAIN_FUNCTIONAL_ASSESSMENT: PREVENTS OR INTERFERES SOME ACTIVE ACTIVITIES AND ADLS

## 2025-08-15 ASSESSMENT — PAIN DESCRIPTION - DESCRIPTORS: DESCRIPTORS: ACHING;THROBBING

## 2025-08-15 ASSESSMENT — PAIN DESCRIPTION - FREQUENCY: FREQUENCY: CONTINUOUS

## 2025-08-17 DIAGNOSIS — E11.59 TYPE 2 DIABETES MELLITUS WITH OTHER CIRCULATORY COMPLICATION, WITH LONG-TERM CURRENT USE OF INSULIN (HCC): Chronic | ICD-10-CM

## 2025-08-17 DIAGNOSIS — Z79.4 TYPE 2 DIABETES MELLITUS WITH OTHER CIRCULATORY COMPLICATION, WITH LONG-TERM CURRENT USE OF INSULIN (HCC): Chronic | ICD-10-CM

## 2025-08-18 RX ORDER — SYRINGE AND NEEDLE,INSULIN,1ML 31GX15/64"
SYRINGE, EMPTY DISPOSABLE MISCELLANEOUS
Qty: 100 EACH | Refills: 0 | Status: SHIPPED | OUTPATIENT
Start: 2025-08-18

## 2025-08-25 RX ORDER — CETIRIZINE HYDROCHLORIDE 10 MG/1
10 TABLET ORAL DAILY
Qty: 90 TABLET | Refills: 0 | Status: SHIPPED | OUTPATIENT
Start: 2025-08-25

## 2025-08-29 ENCOUNTER — HOSPITAL ENCOUNTER (OUTPATIENT)
Facility: HOSPITAL | Age: 55
Discharge: HOME | End: 2025-08-29
Attending: SURGERY
Payer: COMMERCIAL

## 2025-08-29 VITALS
HEART RATE: 81 BPM | TEMPERATURE: 98.4 F | RESPIRATION RATE: 16 BRPM | DIASTOLIC BLOOD PRESSURE: 74 MMHG | SYSTOLIC BLOOD PRESSURE: 133 MMHG

## 2025-08-29 DIAGNOSIS — L03.032 PARONYCHIA, TOE, LEFT: ICD-10-CM

## 2025-08-29 DIAGNOSIS — L97.414 DIABETIC ULCER OF RIGHT MIDFOOT ASSOCIATED WITH TYPE 2 DIABETES MELLITUS, WITH NECROSIS OF BONE (HCC): Primary | ICD-10-CM

## 2025-08-29 DIAGNOSIS — E11.621 DIABETIC ULCER OF RIGHT MIDFOOT ASSOCIATED WITH TYPE 2 DIABETES MELLITUS, WITH NECROSIS OF BONE (HCC): Primary | ICD-10-CM

## 2025-08-29 PROCEDURE — 11042 DBRDMT SUBQ TIS 1ST 20SQCM/<: CPT

## 2025-08-29 RX ORDER — GENTAMICIN SULFATE 1 MG/G
OINTMENT TOPICAL PRN
OUTPATIENT
Start: 2025-08-29

## 2025-08-29 RX ORDER — LIDOCAINE 50 MG/G
OINTMENT TOPICAL PRN
OUTPATIENT
Start: 2025-08-29

## 2025-08-29 RX ORDER — NEOMYCIN/BACITRACIN/POLYMYXINB 3.5-400-5K
OINTMENT (GRAM) TOPICAL PRN
OUTPATIENT
Start: 2025-08-29

## 2025-08-29 RX ORDER — CLOBETASOL PROPIONATE 0.5 MG/G
OINTMENT TOPICAL PRN
OUTPATIENT
Start: 2025-08-29

## 2025-08-29 RX ORDER — MUPIROCIN 2 %
OINTMENT (GRAM) TOPICAL PRN
OUTPATIENT
Start: 2025-08-29

## 2025-08-29 RX ORDER — TRIAMCINOLONE ACETONIDE 1 MG/G
OINTMENT TOPICAL PRN
OUTPATIENT
Start: 2025-08-29

## 2025-08-29 RX ORDER — LIDOCAINE HYDROCHLORIDE 20 MG/ML
JELLY TOPICAL PRN
OUTPATIENT
Start: 2025-08-29

## 2025-08-29 RX ORDER — BACITRACIN ZINC AND POLYMYXIN B SULFATE 500; 1000 [USP'U]/G; [USP'U]/G
OINTMENT TOPICAL PRN
OUTPATIENT
Start: 2025-08-29

## 2025-08-29 RX ORDER — SODIUM CHLOR/HYPOCHLOROUS ACID 0.033 %
SOLUTION, IRRIGATION IRRIGATION PRN
OUTPATIENT
Start: 2025-08-29

## 2025-08-29 RX ORDER — LIDOCAINE HYDROCHLORIDE 40 MG/ML
SOLUTION TOPICAL PRN
OUTPATIENT
Start: 2025-08-29

## 2025-08-29 RX ORDER — CIPROFLOXACIN 500 MG/1
500 TABLET, FILM COATED ORAL 2 TIMES DAILY
Qty: 20 TABLET | Refills: 0 | Status: SHIPPED | OUTPATIENT
Start: 2025-08-29 | End: 2025-09-08

## 2025-08-29 RX ORDER — LIDOCAINE 40 MG/G
CREAM TOPICAL PRN
OUTPATIENT
Start: 2025-08-29

## 2025-08-29 RX ORDER — SILVER SULFADIAZINE 10 MG/G
CREAM TOPICAL PRN
OUTPATIENT
Start: 2025-08-29

## 2025-08-29 RX ORDER — BETAMETHASONE DIPROPIONATE 0.5 MG/G
CREAM TOPICAL PRN
OUTPATIENT
Start: 2025-08-29

## 2025-08-29 RX ORDER — GINSENG 100 MG
CAPSULE ORAL PRN
OUTPATIENT
Start: 2025-08-29

## 2025-08-29 ASSESSMENT — PAIN SCALES - GENERAL: PAINLEVEL_OUTOF10: 0

## 2025-09-04 ENCOUNTER — TELEMEDICINE (OUTPATIENT)
Age: 55
End: 2025-09-04
Payer: COMMERCIAL

## 2025-09-04 DIAGNOSIS — Z79.4 TYPE 2 DIABETES MELLITUS WITH OTHER CIRCULATORY COMPLICATION, WITH LONG-TERM CURRENT USE OF INSULIN (HCC): Chronic | ICD-10-CM

## 2025-09-04 DIAGNOSIS — E11.59 TYPE 2 DIABETES MELLITUS WITH OTHER CIRCULATORY COMPLICATION, WITH LONG-TERM CURRENT USE OF INSULIN (HCC): Chronic | ICD-10-CM

## 2025-09-04 PROCEDURE — 99214 OFFICE O/P EST MOD 30 MIN: CPT | Performed by: NURSE PRACTITIONER

## 2025-09-04 RX ORDER — INSULIN ASPART 100 [IU]/ML
INJECTION, SOLUTION INTRAVENOUS; SUBCUTANEOUS
Qty: 10 ML | Refills: 5 | Status: SHIPPED | OUTPATIENT
Start: 2025-09-04

## 2025-09-04 RX ORDER — INSULIN GLARGINE 100 [IU]/ML
50 INJECTION, SOLUTION SUBCUTANEOUS NIGHTLY
Qty: 45 ML | Refills: 1 | Status: SHIPPED | OUTPATIENT
Start: 2025-09-04

## 2025-09-04 ASSESSMENT — ENCOUNTER SYMPTOMS
SHORTNESS OF BREATH: 0
NAUSEA: 0
VOMITING: 0
ABDOMINAL PAIN: 0

## (undated) DEVICE — MANIFOLD REPROC SUCT 4 PRT F/NEPTUNE 2 WST MGMT SYS

## (undated) DEVICE — SUT MCRYL 4-0 27IN PS2 UD --

## (undated) DEVICE — SOLUTION IRRIG 1000ML 0.9% SOD CHL USP POUR PLAS BTL

## (undated) DEVICE — BANDAGE,GAUZE,BULKEE II,4.5"X4.1YD,STRL: Brand: MEDLINE

## (undated) DEVICE — TUBE SET SONICONE SHARPVAC DISP (MIN ORDER 5)

## (undated) DEVICE — DISPOSABLE TOURNIQUET CUFF SINGLE BLADDER, DUAL PORT AND QUICK CONNECT CONNECTOR: Brand: COLOR CUFF

## (undated) DEVICE — SWAB CULT LIQ STUART AGR AERB MOD IN BRK SGL RAYON TIP PLAS

## (undated) DEVICE — SUTURE SZ 0 27IN 5/8 CIR UR-6  TAPER PT VIOLET ABSRB VICRYL J603H

## (undated) DEVICE — GLOVE ORTHO 7 1/2   MSG9475

## (undated) DEVICE — SKIN PREP TRAY 4 COMPARTM TRAY: Brand: MEDLINE INDUSTRIES, INC.

## (undated) DEVICE — GLOVE ORANGE PI 7   MSG9070

## (undated) DEVICE — TUBE SET OR SONICVAC NEXUS SONICONE

## (undated) DEVICE — SOL IRRIGATION INJ NACL 0.9% 500ML BTL

## (undated) DEVICE — STRIP,CLOSURE,WOUND,MEDI-STRIP,1/2X4: Brand: MEDLINE

## (undated) DEVICE — BANDAGE,GAUZE,CONFORMING,3"X75",STRL,LF: Brand: MEDLINE

## (undated) DEVICE — SYRINGE MED 10ML LUERLOCK TIP W/O SFTY DISP

## (undated) DEVICE — EXTREMITY-MRMC: Brand: MEDLINE INDUSTRIES, INC.

## (undated) DEVICE — C-ARM: Brand: UNBRANDED

## (undated) DEVICE — PART NUMBER 108260, VTI 8 MHZ DISPOSABLE DOPPLER PROBE, STRAIGHT, BOX: Brand: VTI 8 MHZ DISPOSABLE DOPPLER PROBE, STRAIGHT, BOX

## (undated) DEVICE — ZIMMER® STERILE DISPOSABLE TOURNIQUET CUFF WITH PROTECTIVE SLEEVE AND PLC, DUAL PORT, SINGLE BLADDER, 18 IN. (46 CM)

## (undated) DEVICE — SOLUTION IRRIG 500ML 0.9% SOD CHLO USP POUR PLAS BTL

## (undated) DEVICE — APPLIER CLP M/L SHFT DIA5MM 15 LIG LIGAMAX 5

## (undated) DEVICE — GLOVE ORANGE PI 7 1/2   MSG9075

## (undated) DEVICE — SYRINGE,EAR/ULCER, 2 OZ, STERILE: Brand: MEDLINE

## (undated) DEVICE — SOLUTION IV 500ML 0.9% SOD BOTTLE CHL LTWT DURABLE SHATTERPROOF

## (undated) DEVICE — CATHETER IV 14GA L2IN POLYUR STR ORNG HUB SFTY RADPQ DISP

## (undated) DEVICE — DECANTER BTL 6IN: Brand: MEDLINE INDUSTRIES, INC.

## (undated) DEVICE — PAD,ABDOMINAL,5"X9",ST,LF,25/BX: Brand: MEDLINE INDUSTRIES, INC.

## (undated) DEVICE — HYPODERMIC SAFETY NEEDLE: Brand: MONOJECT

## (undated) DEVICE — SUTURE ETHILON SZ 2-0 L30IN NONABSORBABLE BLK L36MM PSLX 3/8 1697H

## (undated) DEVICE — 450 ML BOTTLE OF 0.05% CHLORHEXIDINE GLUCONATE IN 99.95% STERILE WATER FOR IRRIGATION, USP AND APPLICATOR.: Brand: IRRISEPT ANTIMICROBIAL WOUND LAVAGE

## (undated) DEVICE — 4-PORT MANIFOLD: Brand: NEPTUNE 2

## (undated) DEVICE — LAPAROSCOPIC TROCAR SLEEVE/SINGLE USE: Brand: KII® OPTICAL ACCESS SYSTEM

## (undated) DEVICE — DEVICE SUT FOR TRCR SITE INCIS ENDO CLOSE

## (undated) DEVICE — DRESSING STERILE PETRO W3XL8IN N ADH OIL EMUL GZ CURAD

## (undated) DEVICE — GLOVE SURG SZ 65 THK91MIL LTX FREE SYN POLYISOPRENE

## (undated) DEVICE — GENERAL LAPAROSCOPY-MRMC: Brand: MEDLINE INDUSTRIES, INC.

## (undated) DEVICE — SUTURE VICRYL SZ 2-0 L27IN ABSRB UD L26MM SH 1/2 CIR J417H

## (undated) DEVICE — ELECTRODE PT RET AD L9FT HI MOIST COND ADH HYDRGEL CORDED

## (undated) DEVICE — E-Z CLEAN, PTFE COATED, ELECTROSURGICAL LAPAROSCOPIC ELECTRODE, L-HOOK, 33 CM., SINGLE-USE, FOR USE WITH HAND CONTROL PENCIL: Brand: MEGADYNE

## (undated) DEVICE — DRAPE,REIN 53X77,STERILE: Brand: MEDLINE

## (undated) DEVICE — PAD,NON-ADHERENT,W/AD,3X4,ST,LF,1/PK: Brand: MEDLINE

## (undated) DEVICE — SHEET,DRAPE,UNDERBUTTOCK,GRAD POUCH,PORT: Brand: MEDLINE

## (undated) DEVICE — DRESSING PETRO W3XL3IN OIL EMUL N ADH GZ KNIT IMPREG CELOS

## (undated) DEVICE — VISUALIZATION SYSTEM: Brand: CLEARIFY

## (undated) DEVICE — SPONGE GZ W4XL4IN COT 12 PLY TYP VII WVN C FLD DSGN STERILE

## (undated) DEVICE — DRAPE,EXTREMITY,89X128,STERILE: Brand: MEDLINE

## (undated) DEVICE — TISSUE RETRIEVAL SYSTEM: Brand: INZII RETRIEVAL SYSTEM

## (undated) DEVICE — COVER,MAYO STAND,XL,STERILE: Brand: MEDLINE

## (undated) DEVICE — COVER LT HNDL PLAS RIG 1 PER PK

## (undated) DEVICE — TROCAR: Brand: KII® SLEEVE

## (undated) DEVICE — TROCAR: Brand: KII FIOS FIRST ENTRY

## (undated) DEVICE — DRESSING,CONTACT LAYER,VERSATEL ONE,4X7: Brand: MEDLINE INDUSTRIES, INC.

## (undated) DEVICE — Device

## (undated) DEVICE — DERMABOND SKIN ADH 0.7ML -- DERMABOND ADVANCED 12/BX

## (undated) DEVICE — MINOR BASIN -SMH: Brand: MEDLINE INDUSTRIES, INC.

## (undated) DEVICE — CONTAINER SPEC ANAERB VACTNR

## (undated) DEVICE — BANDAGE COMPR W4INXL5YD WHT BGE POLY COT M E WRP WV HK AND

## (undated) DEVICE — HYPODERMIC SAFETY NEEDLE: Brand: MAGELLAN

## (undated) DEVICE — SET CHOLANGIOGRAPHY 4FR L60CM W/ ARW KARLAN BLLN CATH CRV

## (undated) DEVICE — STRIP WND PK W0.25INXL5YD IODO GZ TIGHTLY WVN CURAD